# Patient Record
Sex: MALE | Race: WHITE | NOT HISPANIC OR LATINO | Employment: OTHER | URBAN - METROPOLITAN AREA
[De-identification: names, ages, dates, MRNs, and addresses within clinical notes are randomized per-mention and may not be internally consistent; named-entity substitution may affect disease eponyms.]

---

## 2023-01-16 PROBLEM — K21.00 CHRONIC REFLUX ESOPHAGITIS: Status: ACTIVE | Noted: 2023-01-16

## 2023-01-16 PROBLEM — R53.1 FEELING WEAK: Status: ACTIVE | Noted: 2023-01-16

## 2023-01-16 PROBLEM — R39.89 BLADDER PAIN: Status: ACTIVE | Noted: 2023-01-16

## 2023-01-16 PROBLEM — M79.673 FOOT PAIN: Status: ACTIVE | Noted: 2023-01-16

## 2023-01-16 PROBLEM — N19 RENAL FAILURE: Status: ACTIVE | Noted: 2023-01-16

## 2023-01-16 PROBLEM — R42 DIZZINESS: Status: ACTIVE | Noted: 2023-01-16

## 2023-01-16 PROBLEM — H66.90 EAR INFECTION: Status: ACTIVE | Noted: 2023-01-16

## 2023-01-16 PROBLEM — I65.29 CAROTID STENOSIS: Status: ACTIVE | Noted: 2023-01-16

## 2023-01-16 PROBLEM — E66.9 CLASS 1 OBESITY WITH BODY MASS INDEX (BMI) OF 31.0 TO 31.9 IN ADULT: Status: ACTIVE | Noted: 2023-01-16

## 2023-01-16 PROBLEM — M75.40 IMPINGEMENT SYNDROME OF SHOULDER: Status: ACTIVE | Noted: 2023-01-16

## 2023-01-16 PROBLEM — J32.9 SINUSITIS: Status: ACTIVE | Noted: 2023-01-16

## 2023-01-16 PROBLEM — R06.02 SHORTNESS OF BREATH: Status: ACTIVE | Noted: 2023-01-16

## 2023-01-16 PROBLEM — E55.9 VITAMIN D DEFICIENCY: Status: ACTIVE | Noted: 2023-01-16

## 2023-01-16 PROBLEM — M25.511 RIGHT SHOULDER PAIN: Status: ACTIVE | Noted: 2023-01-16

## 2023-01-16 PROBLEM — L03.90 CELLULITIS: Status: ACTIVE | Noted: 2023-01-16

## 2023-01-16 PROBLEM — I73.9 CLAUDICATION (CMS-HCC): Status: ACTIVE | Noted: 2023-01-16

## 2023-01-16 PROBLEM — H26.9 CATARACT: Status: ACTIVE | Noted: 2023-01-16

## 2023-01-16 PROBLEM — R35.0 URINARY FREQUENCY: Status: ACTIVE | Noted: 2023-01-16

## 2023-01-16 PROBLEM — M79.606 LEG PAIN: Status: ACTIVE | Noted: 2023-01-16

## 2023-01-16 PROBLEM — I25.10 ARTERIOSCLEROTIC CARDIOVASCULAR DISEASE: Status: ACTIVE | Noted: 2023-01-16

## 2023-01-16 PROBLEM — N23 KIDNEY PAIN: Status: ACTIVE | Noted: 2023-01-16

## 2023-01-16 PROBLEM — Z91.09 ENVIRONMENTAL ALLERGIES: Status: ACTIVE | Noted: 2023-01-16

## 2023-01-16 PROBLEM — K63.5 COLON POLYPS: Status: ACTIVE | Noted: 2023-01-16

## 2023-01-16 PROBLEM — I72.9 ANEURYSM (CMS-HCC): Status: ACTIVE | Noted: 2023-01-16

## 2023-01-16 PROBLEM — Z90.79 S/P TURP: Status: ACTIVE | Noted: 2023-01-16

## 2023-01-16 PROBLEM — H57.89 EYE REDNESS: Status: ACTIVE | Noted: 2023-01-16

## 2023-01-16 PROBLEM — I10 HYPERTENSION: Status: ACTIVE | Noted: 2023-01-16

## 2023-01-16 PROBLEM — D64.9 ANEMIA: Status: ACTIVE | Noted: 2023-01-16

## 2023-01-16 PROBLEM — K21.9 ESOPHAGEAL REFLUX: Status: ACTIVE | Noted: 2023-01-16

## 2023-01-16 PROBLEM — E78.00 HYPERCHOLESTEROLEMIA: Status: ACTIVE | Noted: 2023-01-16

## 2023-01-16 PROBLEM — R73.9 ELEVATED BLOOD SUGAR: Status: ACTIVE | Noted: 2023-01-16

## 2023-01-16 PROBLEM — M75.51 BURSITIS OF RIGHT SHOULDER: Status: ACTIVE | Noted: 2023-01-16

## 2023-01-16 PROBLEM — A09 ACUTE INFECTIVE GASTROENTERITIS: Status: ACTIVE | Noted: 2023-01-16

## 2023-01-16 PROBLEM — E16.3 ABNORMALITY OF GLUCAGON SECRETION (HHS-HCC): Status: ACTIVE | Noted: 2023-01-16

## 2023-01-16 PROBLEM — N39.0 UTI (URINARY TRACT INFECTION): Status: ACTIVE | Noted: 2023-01-16

## 2023-01-16 PROBLEM — I71.40 AAA (ABDOMINAL AORTIC ANEURYSM) (CMS-HCC): Status: ACTIVE | Noted: 2023-01-16

## 2023-01-16 PROBLEM — E78.00 PURE HYPERCHOLESTEROLEMIA: Status: ACTIVE | Noted: 2023-01-16

## 2023-01-16 PROBLEM — E66.811 CLASS 1 OBESITY WITH BODY MASS INDEX (BMI) OF 31.0 TO 31.9 IN ADULT: Status: ACTIVE | Noted: 2023-01-16

## 2023-01-16 PROBLEM — R10.9 ABDOMINAL PAIN: Status: ACTIVE | Noted: 2023-01-16

## 2023-01-16 PROBLEM — E86.0 DEHYDRATION: Status: ACTIVE | Noted: 2023-01-16

## 2023-01-16 PROBLEM — E53.9 VITAMIN B DEFICIENCY: Status: ACTIVE | Noted: 2023-01-16

## 2023-01-16 RX ORDER — FINASTERIDE 5 MG/1
5 TABLET, FILM COATED ORAL DAILY
COMMUNITY
End: 2023-06-29

## 2023-01-16 RX ORDER — HYDRALAZINE HYDROCHLORIDE 25 MG/1
25 TABLET, FILM COATED ORAL 3 TIMES DAILY
COMMUNITY
End: 2023-03-26

## 2023-01-16 RX ORDER — FAMOTIDINE 20 MG/1
20 TABLET, FILM COATED ORAL 2 TIMES DAILY
COMMUNITY
End: 2023-06-09

## 2023-01-16 RX ORDER — FLUTICASONE PROPIONATE 50 MCG
1 SPRAY, SUSPENSION (ML) NASAL
COMMUNITY
End: 2023-05-05

## 2023-01-16 RX ORDER — ASPIRIN 325 MG
325 TABLET ORAL DAILY
COMMUNITY

## 2023-01-16 RX ORDER — SIMVASTATIN 40 MG/1
40 TABLET, FILM COATED ORAL DAILY
COMMUNITY
End: 2023-06-10 | Stop reason: SDUPTHER

## 2023-01-16 RX ORDER — OFLOXACIN 3 MG/ML
10 SOLUTION AURICULAR (OTIC) DAILY
COMMUNITY
End: 2023-03-04 | Stop reason: ENTERED-IN-ERROR

## 2023-01-16 RX ORDER — FUROSEMIDE 40 MG/1
40 TABLET ORAL DAILY
COMMUNITY
End: 2023-05-05

## 2023-01-16 RX ORDER — ALFUZOSIN HYDROCHLORIDE 10 MG/1
10 TABLET, EXTENDED RELEASE ORAL
COMMUNITY
End: 2023-04-05

## 2023-01-16 RX ORDER — DOXYCYCLINE 100 MG/1
100 CAPSULE ORAL EVERY 12 HOURS
COMMUNITY
End: 2023-10-06 | Stop reason: WASHOUT

## 2023-01-16 RX ORDER — CLOPIDOGREL BISULFATE 75 MG/1
75 TABLET ORAL DAILY
COMMUNITY
End: 2023-06-29

## 2023-01-16 RX ORDER — POTASSIUM CHLORIDE 20 MEQ/1
20 TABLET, EXTENDED RELEASE ORAL
COMMUNITY
End: 2023-08-19 | Stop reason: SDUPTHER

## 2023-01-16 RX ORDER — METOPROLOL TARTRATE 50 MG/1
50 TABLET ORAL 2 TIMES DAILY
COMMUNITY
End: 2023-09-30

## 2023-01-16 RX ORDER — AMLODIPINE BESYLATE 10 MG/1
10 TABLET ORAL
COMMUNITY
End: 2023-06-29

## 2023-01-16 RX ORDER — ACETAMINOPHEN 500 MG
1 TABLET ORAL
COMMUNITY
End: 2023-10-01

## 2023-01-16 RX ORDER — PANTOPRAZOLE SODIUM 40 MG/1
40 TABLET, DELAYED RELEASE ORAL
COMMUNITY
End: 2023-07-08 | Stop reason: SDUPTHER

## 2023-01-16 RX ORDER — ACETAMINOPHEN, DEXTROMETHORPHAN HBR, DOXYLAMINE SUCCINATE, PHENYLEPHRINE HCL 650; 20; 12.5; 1 MG/30ML; MG/30ML; MG/30ML; MG/30ML
1000 SOLUTION ORAL DAILY
COMMUNITY

## 2023-01-30 RX ORDER — AMMONIUM LACTATE 12 G/100G
LOTION TOPICAL
COMMUNITY
Start: 2022-10-01

## 2023-03-04 ENCOUNTER — OFFICE VISIT (OUTPATIENT)
Dept: PRIMARY CARE | Facility: CLINIC | Age: 78
End: 2023-03-04
Payer: MEDICARE

## 2023-03-04 VITALS
DIASTOLIC BLOOD PRESSURE: 72 MMHG | HEIGHT: 72 IN | WEIGHT: 247 LBS | BODY MASS INDEX: 33.46 KG/M2 | SYSTOLIC BLOOD PRESSURE: 142 MMHG

## 2023-03-04 DIAGNOSIS — H66.90 EAR INFECTION: Primary | ICD-10-CM

## 2023-03-04 DIAGNOSIS — H60.20 OTITIS EXTERNA DUE TO PSEUDOMONAS AERUGINOSA: Primary | ICD-10-CM

## 2023-03-04 PROCEDURE — 3077F SYST BP >= 140 MM HG: CPT | Performed by: INTERNAL MEDICINE

## 2023-03-04 PROCEDURE — 1159F MED LIST DOCD IN RCRD: CPT | Performed by: INTERNAL MEDICINE

## 2023-03-04 PROCEDURE — 3078F DIAST BP <80 MM HG: CPT | Performed by: INTERNAL MEDICINE

## 2023-03-04 PROCEDURE — 99214 OFFICE O/P EST MOD 30 MIN: CPT | Performed by: INTERNAL MEDICINE

## 2023-03-04 RX ORDER — OFLOXACIN 3 MG/ML
5 SOLUTION AURICULAR (OTIC) 2 TIMES DAILY
Qty: 10 ML | Refills: 0 | Status: SHIPPED | OUTPATIENT
Start: 2023-03-04 | End: 2023-03-11

## 2023-03-04 RX ORDER — AMOXICILLIN AND CLAVULANATE POTASSIUM 875; 125 MG/1; MG/1
875 TABLET, FILM COATED ORAL 2 TIMES DAILY
Qty: 20 TABLET | Refills: 0 | Status: SHIPPED | OUTPATIENT
Start: 2023-03-04 | End: 2023-10-06 | Stop reason: WASHOUT

## 2023-03-04 RX ORDER — BACITRACIN 500 [USP'U]/G
1 OINTMENT TOPICAL ONCE
Status: CANCELLED | OUTPATIENT
Start: 2023-03-04

## 2023-03-04 RX ORDER — OFLOXACIN 3 MG/ML
5 SOLUTION AURICULAR (OTIC) ONCE
Status: CANCELLED | OUTPATIENT
Start: 2023-03-04 | End: 2023-03-04

## 2023-03-04 ASSESSMENT — ENCOUNTER SYMPTOMS
LOSS OF SENSATION IN FEET: 0
OCCASIONAL FEELINGS OF UNSTEADINESS: 0
DEPRESSION: 0

## 2023-03-06 ENCOUNTER — OFFICE VISIT (OUTPATIENT)
Dept: PRIMARY CARE | Facility: CLINIC | Age: 78
End: 2023-03-06
Payer: MEDICARE

## 2023-03-06 VITALS
HEIGHT: 72 IN | WEIGHT: 248.2 LBS | SYSTOLIC BLOOD PRESSURE: 128 MMHG | DIASTOLIC BLOOD PRESSURE: 72 MMHG | BODY MASS INDEX: 33.62 KG/M2

## 2023-03-06 DIAGNOSIS — I10 PRIMARY HYPERTENSION: Primary | ICD-10-CM

## 2023-03-06 PROCEDURE — 3078F DIAST BP <80 MM HG: CPT | Performed by: INTERNAL MEDICINE

## 2023-03-06 PROCEDURE — 99213 OFFICE O/P EST LOW 20 MIN: CPT | Performed by: INTERNAL MEDICINE

## 2023-03-06 PROCEDURE — 1159F MED LIST DOCD IN RCRD: CPT | Performed by: INTERNAL MEDICINE

## 2023-03-06 PROCEDURE — 3074F SYST BP LT 130 MM HG: CPT | Performed by: INTERNAL MEDICINE

## 2023-03-09 NOTE — PROGRESS NOTES
NAME OF THE PATIENT: Charles Chan    YOB: 1945    CHIEF COMPLAINT:  This gentleman today came here for follow-up on right ear pinna perichondritis.  It is improving, red, inflammation and tender much better.  The patient is 20% better.  He is started taking antibiotics.  Pain is calming well.  Pain is getting better.  Not feeling well.    PAST MEDICAL HISTORY:  Reviewed on EMR, unchanged.    CURRENT MEDICATIONS:  Reviewed on EMR, unchanged.  List reviewed.    ALLERGIES:  Reviewed on EMR, unchanged.    SOCIAL HISTORY:  Reviewed on EMR, unchanged.  He does not smoke and does not drink alcohol.    FAMILY HISTORY:  Reviewed on EMR, unchanged.    REVIEW OF SYSTEMS:  All 12 systems reviewed and pertaining covered in history and physical.    PHYSICAL EXAMINATION  VITAL SIGNS:  As recorded and reviewed from EMR.  RESPIRATORY:  The patient had normal inspirations and expirations.  The breath sounds were equal bilaterally and clear to auscultation.  CARDIOVASCULAR:  The patient had S1 normal, split S2 without obvious rubs, clicks, or murmurs.    GASTROINTESTINAL:  There was no hepatosplenomegaly.  There were no palpable masses and no inguinal nodes.  EXTREMITIES:  Legs had no edema.  NEUROLOGIC:  The patient had normal cranial nerves.  The reflexes, sensory, and motor examination were grossly within normal limits.  SKIN:  Right pinna perichondritis, redness much better.  Cellulitis improved.    LAB WORK:  Laboratory testing discussed.    ASSESSMENT AND PLAN:  Cellulitis, perichondritis, infection.  Slow response.  Continue with antibiotics.  Pre-diabetic, renal insufficiency.  Monitor.  Advised to drink enough water.  Stay hydrated.  See me ASAP if needed. Otherwise, follow-up on Monday.  Happy to see him anytime sooner if necessary.      Kindly review this note in conjunction with EMR.   Subjective   Patient ID: Charles Chan is a 77 y.o. male who presents for Follow-up.      HPI    Review of  Systems    Objective   /72   Ht 1.829 m (6')   Wt 113 kg (248 lb 3.2 oz)   BMI 33.66 kg/m²       Physical Exam    Assessment/Plan   Problem List Items Addressed This Visit    None

## 2023-03-13 ENCOUNTER — OFFICE VISIT (OUTPATIENT)
Dept: PRIMARY CARE | Facility: CLINIC | Age: 78
End: 2023-03-13
Payer: MEDICARE

## 2023-03-13 VITALS
SYSTOLIC BLOOD PRESSURE: 134 MMHG | WEIGHT: 249 LBS | DIASTOLIC BLOOD PRESSURE: 72 MMHG | HEIGHT: 72 IN | BODY MASS INDEX: 33.72 KG/M2

## 2023-03-13 DIAGNOSIS — I10 PRIMARY HYPERTENSION: ICD-10-CM

## 2023-03-13 DIAGNOSIS — L30.9 DERMATITIS: Primary | ICD-10-CM

## 2023-03-13 PROCEDURE — 3078F DIAST BP <80 MM HG: CPT | Performed by: INTERNAL MEDICINE

## 2023-03-13 PROCEDURE — 99213 OFFICE O/P EST LOW 20 MIN: CPT | Performed by: INTERNAL MEDICINE

## 2023-03-13 PROCEDURE — 1159F MED LIST DOCD IN RCRD: CPT | Performed by: INTERNAL MEDICINE

## 2023-03-13 PROCEDURE — 3075F SYST BP GE 130 - 139MM HG: CPT | Performed by: INTERNAL MEDICINE

## 2023-03-13 RX ORDER — ECONAZOLE NITRATE 10 MG/G
CREAM TOPICAL DAILY
Qty: 300 G | Refills: 0 | Status: SHIPPED | OUTPATIENT
Start: 2023-03-13 | End: 2024-05-03

## 2023-03-13 NOTE — PROGRESS NOTES
NAME OF THE PATIENT: Charles Chan    YOB: 1945    CHIEF COMPLAINT:  Mr. Charles Chan today came here for follow-up.  Right ear pinna infection has significant improvement.  Feeling better, but he forgets to put the ear drops a couple of times.  Follow-up on other conditions.  New problem, he has athlete foot, itchy, scratchy, it is not getting any better.    PAST MEDICAL HISTORY:  Reviewed on EMR, unchanged.    CURRENT MEDICATIONS:  Reviewed on EMR, unchanged.  List reviewed.    ALLERGIES:  Reviewed on EMR, unchanged.    SOCIAL HISTORY:  Reviewed on EMR, unchanged.  He does not smoke and does not drink alcohol.    FAMILY HISTORY:  Reviewed on EMR, unchanged.    REVIEW OF SYSTEMS:  All 12 systems reviewed and pertaining covered in history and physical.    PHYSICAL EXAMINATION  VITAL SIGNS:  As recorded and reviewed from EMR.  ENT:  right ear otitis externa present.  RESPIRATORY:  The patient had normal inspirations and expirations.  The breath sounds were equal bilaterally and clear to auscultation.  CARDIOVASCULAR:  The patient had S1 normal, split S2 without obvious rubs, clicks, or murmurs.    GASTROINTESTINAL:  There was no hepatosplenomegaly.  There were no palpable masses and no inguinal nodes.  EXTREMITIES:  Legs had no edema.  Feet, athlete foot present.  NEUROLOGIC:  The patient had normal cranial nerves.  The reflexes, sensory, and motor examination were grossly within normal limits.    LAB WORK:  Laboratory testing discussed.    ASSESSMENT AND PLAN:  Right ear otitis externa is responding very good to treatment.  Continue.  Athlete foot.  Econazole cream.  Renal insufficiency, okay.  Hypertension, okay.  Routine blood work ordered.  I will see back him in four weeks.      Kindly review this note in conjunction with EMR.   Subjective   Patient ID: Charles Chan is a 77 y.o. male who presents for Follow-up.      HPI    Review of Systems    Objective   /72   Ht 1.829 m (6')   Wt  113 kg (249 lb)   BMI 33.77 kg/m²       Physical Exam    Assessment/Plan   Problem List Items Addressed This Visit    None

## 2023-03-26 DIAGNOSIS — I10 ESSENTIAL (PRIMARY) HYPERTENSION: ICD-10-CM

## 2023-03-26 RX ORDER — HYDRALAZINE HYDROCHLORIDE 25 MG/1
TABLET, FILM COATED ORAL
Qty: 270 TABLET | Refills: 1 | Status: SHIPPED | OUTPATIENT
Start: 2023-03-26 | End: 2023-08-19 | Stop reason: SDUPTHER

## 2023-04-03 ENCOUNTER — LAB (OUTPATIENT)
Dept: LAB | Facility: LAB | Age: 78
End: 2023-04-03
Payer: MEDICARE

## 2023-04-03 DIAGNOSIS — K80.63 GALLBLADDER & BILE DUCT STONE, ACUTE CHOLECYSTITIS AND OBSTRUCTION: ICD-10-CM

## 2023-04-03 LAB
ALANINE AMINOTRANSFERASE (SGPT) (U/L) IN SER/PLAS: 13 U/L (ref 10–52)
ALBUMIN (G/DL) IN SER/PLAS: 3.9 G/DL (ref 3.4–5)
ALKALINE PHOSPHATASE (U/L) IN SER/PLAS: 77 U/L (ref 33–136)
ANION GAP IN SER/PLAS: 14 MMOL/L (ref 10–20)
ASPARTATE AMINOTRANSFERASE (SGOT) (U/L) IN SER/PLAS: 16 U/L (ref 9–39)
BILIRUBIN TOTAL (MG/DL) IN SER/PLAS: 0.8 MG/DL (ref 0–1.2)
CALCIUM (MG/DL) IN SER/PLAS: 8.8 MG/DL (ref 8.6–10.3)
CARBON DIOXIDE, TOTAL (MMOL/L) IN SER/PLAS: 24 MMOL/L (ref 21–32)
CHLORIDE (MMOL/L) IN SER/PLAS: 109 MMOL/L (ref 98–107)
CREATININE (MG/DL) IN SER/PLAS: 2.56 MG/DL (ref 0.5–1.3)
GFR MALE: 25 ML/MIN/1.73M2
GLUCOSE (MG/DL) IN SER/PLAS: 93 MG/DL (ref 74–99)
POTASSIUM (MMOL/L) IN SER/PLAS: 4.7 MMOL/L (ref 3.5–5.3)
PROTEIN TOTAL: 7 G/DL (ref 6.4–8.2)
SODIUM (MMOL/L) IN SER/PLAS: 142 MMOL/L (ref 136–145)
UREA NITROGEN (MG/DL) IN SER/PLAS: 32 MG/DL (ref 6–23)

## 2023-04-03 PROCEDURE — 80053 COMPREHEN METABOLIC PANEL: CPT

## 2023-04-03 PROCEDURE — 36415 COLL VENOUS BLD VENIPUNCTURE: CPT

## 2023-04-05 DIAGNOSIS — Z87.438 PERSONAL HISTORY OF OTHER DISEASES OF MALE GENITAL ORGANS: ICD-10-CM

## 2023-04-05 RX ORDER — ALFUZOSIN HYDROCHLORIDE 10 MG/1
TABLET, EXTENDED RELEASE ORAL
Qty: 90 TABLET | Refills: 1 | Status: SHIPPED | OUTPATIENT
Start: 2023-04-05 | End: 2023-09-30

## 2023-04-10 ENCOUNTER — OFFICE VISIT (OUTPATIENT)
Dept: PRIMARY CARE | Facility: CLINIC | Age: 78
End: 2023-04-10
Payer: MEDICARE

## 2023-04-10 VITALS
SYSTOLIC BLOOD PRESSURE: 134 MMHG | WEIGHT: 246.4 LBS | DIASTOLIC BLOOD PRESSURE: 72 MMHG | HEIGHT: 72 IN | BODY MASS INDEX: 33.38 KG/M2

## 2023-04-10 DIAGNOSIS — R10.9 ABDOMINAL PAIN, UNSPECIFIED ABDOMINAL LOCATION: ICD-10-CM

## 2023-04-10 PROCEDURE — 1159F MED LIST DOCD IN RCRD: CPT | Performed by: INTERNAL MEDICINE

## 2023-04-10 PROCEDURE — 3075F SYST BP GE 130 - 139MM HG: CPT | Performed by: INTERNAL MEDICINE

## 2023-04-10 PROCEDURE — 3078F DIAST BP <80 MM HG: CPT | Performed by: INTERNAL MEDICINE

## 2023-04-10 PROCEDURE — 99214 OFFICE O/P EST MOD 30 MIN: CPT | Performed by: INTERNAL MEDICINE

## 2023-04-10 NOTE — PROGRESS NOTES
OFFICE NOTE    NAME OF THE PATIENT: hCarles Chan    YOB: 1945    CHIEF COMPLAINT: This gentleman today came here for multiple issues.  Right ear pain, itchy, scratchy rash, not getting any better.  Mucoid discharge is coming out.  This problem is ongoing, but it got worse.  Follow-up on blood work and other conditions.  He had a blood work on Monday.  He came here for follow-up.  Appetite and weight are okay.  No problem.  No leg swelling.  He has cut down on the salt.    PAST MEDICAL HISTORY: Reviewed on EMR, unchanged.    CURRENT MEDICATIONS: Reviewed on EMR, unchanged.  List reviewed.    ALLERGIES: Reviewed on EMR, unchanged.    SOCIAL HISTORY: Reviewed on EMR, unchanged.  He does not smoke and does not drink alcohol.    FAMILY HISTORY: Reviewed on EMR, unchanged.    REVIEW OF SYSTEMS: All 12 systems reviewed and pertaining covered in history and physical.    PHYSICAL EXAMINATION  VITAL SIGNS: As recorded and reviewed from EMR.  EARS: Right pinna exam, red, inflamed, tender.  ______ present that is going right in the canal.  Canal is full of slough and mucoid discharge.  RESPIRATORY: The patient had normal inspirations and expirations.  The breath sounds were equal bilaterally and clear to auscultation.  CARDIOVASCULAR: The patient had S1 normal, split S2 without obvious rubs, clicks, or murmurs.    GASTROINTESTINAL: There was no hepatosplenomegaly.  There were no palpable masses and no inguinal nodes.  EXTREMITIES: Legs had no edema.  NEUROLOGIC: The patient had normal cranial nerves.  The reflexes, sensory, and motor examination were grossly within normal limits.    LAB WORK: Laboratory testing discussed.    ASSESSMENT AND PLAN:  Otitis externa, costochondritis, infection and cellulitis.  Clean with hot water only.  Floxin ear drops.  I gave him Augmentin and doxycycline that should cover MRSA.  Bacitracin cream given.  Pre-diabetic.  It could be predisposing for infection.   Monitor.  Chronic renal failure, stage 3 now.  I am a bit concerned with that.  Drink enough water.  Hypertension, okay.  See him on Monday or Tuesday.  If it gets worse, he will be admitted for IV antibiotics.      Kindly review this note in conjunction with EMR.     Subjective   Patient ID: Charles Chan is a 77 y.o. male who presents for Follow-up.      HPI    Review of Systems    Objective   /72   Ht 1.829 m (6')   Wt 112 kg (247 lb)   BMI 33.50 kg/m²       Physical Exam    Assessment/Plan   Problem List Items Addressed This Visit          Infectious/Inflammatory    Ear infection - Primary    Relevant Medications    amoxicillin-pot clavulanate (Augmentin) 875-125 mg tablet

## 2023-04-10 NOTE — PROGRESS NOTES
OFFICE NOTE    NAME OF THE PATIENT: Charles Chan    YOB: 1945    CHIEF COMPLAINT:  This gentleman today came here for follow-up on various conditions.  Overall, he is okay.  He is wondering the virtue for metoprolol and amlodipine.  He thinks that could be causing the problem.  Appetite and weight are okay.  No problem.    PAST MEDICAL HISTORY:  Reviewed on EMR, unchanged.    CURRENT MEDICATIONS:  Reviewed on EMR, unchanged.  List reviewed.    ALLERGIES:  Reviewed on EMR, unchanged.    SOCIAL HISTORY:  Reviewed on EMR, unchanged.  He does not smoke and does not drink alcohol.    FAMILY HISTORY:  Reviewed on EMR, unchanged.    REVIEW OF SYSTEMS:  All 12 systems reviewed and pertaining covered in history and physical.    PHYSICAL EXAMINATION  VITAL SIGNS:  As recorded and reviewed from EMR.  RESPIRATORY:  The patient had normal inspirations and expirations.  The breath sounds were equal bilaterally and clear to auscultation.  CARDIOVASCULAR:  The patient had S1 normal, split S2 without obvious rubs, clicks, or murmurs.    GASTROINTESTINAL:  There was no hepatosplenomegaly.  There were no palpable masses and no inguinal nodes.  EXTREMITIES:  Legs had edema.  NEUROLOGIC:  The patient had normal cranial nerves.  The reflexes, sensory, and motor examination were grossly within normal limits.    LAB WORK:  Laboratory testing discussed.    ASSESSMENT AND PLAN:  Chronic renal failure, stage 3, stabilized, but I will keep an eye.  Hypertension.  The patient has a reservation about taking metoprolol and amlodipine.  Explained both the drugs.  We discussed about different alternatives, but considering the condition, we decided to stay on the both.  Edema.  Lasix and potassium.  Monitor kidney.  Hypertension, okay.  High cholesterol, stable.  GERD, on PPI.  Follow-up appointment with me in two to three weeks time.      Kindly review this note in conjunction with EMR.   Subjective   Patient ID: Charles BELLA  Dustin is a 77 y.o. male who presents for Follow-up.      HPI    Review of Systems    Objective   /72   Ht 1.829 m (6')   Wt 112 kg (246 lb 6.4 oz)   BMI 33.42 kg/m²       Physical Exam    Assessment/Plan   Problem List Items Addressed This Visit    None

## 2023-04-21 ENCOUNTER — LAB (OUTPATIENT)
Dept: LAB | Facility: LAB | Age: 78
End: 2023-04-21
Payer: MEDICARE

## 2023-04-21 DIAGNOSIS — R10.9 ABDOMINAL PAIN, UNSPECIFIED ABDOMINAL LOCATION: ICD-10-CM

## 2023-04-21 LAB
ANION GAP IN SER/PLAS: 11 MMOL/L (ref 10–20)
CALCIUM (MG/DL) IN SER/PLAS: 8.9 MG/DL (ref 8.6–10.3)
CARBON DIOXIDE, TOTAL (MMOL/L) IN SER/PLAS: 26 MMOL/L (ref 21–32)
CHLORIDE (MMOL/L) IN SER/PLAS: 106 MMOL/L (ref 98–107)
CREATININE (MG/DL) IN SER/PLAS: 2.42 MG/DL (ref 0.5–1.3)
GFR MALE: 26 ML/MIN/1.73M2
GLUCOSE (MG/DL) IN SER/PLAS: 83 MG/DL (ref 74–99)
POTASSIUM (MMOL/L) IN SER/PLAS: 4.4 MMOL/L (ref 3.5–5.3)
SODIUM (MMOL/L) IN SER/PLAS: 139 MMOL/L (ref 136–145)
UREA NITROGEN (MG/DL) IN SER/PLAS: 29 MG/DL (ref 6–23)

## 2023-04-21 PROCEDURE — 36415 COLL VENOUS BLD VENIPUNCTURE: CPT

## 2023-04-21 PROCEDURE — 80048 BASIC METABOLIC PNL TOTAL CA: CPT

## 2023-04-29 ENCOUNTER — OFFICE VISIT (OUTPATIENT)
Dept: PRIMARY CARE | Facility: CLINIC | Age: 78
End: 2023-04-29
Payer: MEDICARE

## 2023-04-29 VITALS
HEIGHT: 72 IN | SYSTOLIC BLOOD PRESSURE: 126 MMHG | DIASTOLIC BLOOD PRESSURE: 78 MMHG | WEIGHT: 243 LBS | BODY MASS INDEX: 32.91 KG/M2

## 2023-04-29 DIAGNOSIS — E78.00 HYPERCHOLESTEROLEMIA: ICD-10-CM

## 2023-04-29 PROCEDURE — 3074F SYST BP LT 130 MM HG: CPT | Performed by: INTERNAL MEDICINE

## 2023-04-29 PROCEDURE — 1159F MED LIST DOCD IN RCRD: CPT | Performed by: INTERNAL MEDICINE

## 2023-04-29 PROCEDURE — 3078F DIAST BP <80 MM HG: CPT | Performed by: INTERNAL MEDICINE

## 2023-04-29 PROCEDURE — 99214 OFFICE O/P EST MOD 30 MIN: CPT | Performed by: INTERNAL MEDICINE

## 2023-04-29 NOTE — PROGRESS NOTES
OFFICE NOTE    NAME OF THE PATIENT: Charles Chan    YOB: 1945    CHIEF COMPLAINT:  It is always a delight to serve Mr. Chan.  He brought today for polypharmacy  review, all his medicine.  Appetite and weight are okay.  No problem.  He came here for follow-up.  He is drinking more water, cut down the salt and he came here for kidney checkup.  He brought all his medicine for review.    PAST MEDICAL HISTORY:  Reviewed on EMR, unchanged.    CURRENT MEDICATIONS:  Reviewed on EMR, unchanged.  Lasix, potassium 4 pm, Plavix 6 pm, Protonix 40 mg b.i.d., ______ (Uroxatral) in the evening time, finasteride 5 mg once a day, hydralazine 25 mg t.i.d., baby aspirin with simvastatin at bedtime, vitamin B12, vitamin D, metoprolol tartrate 50 mg b.i.d., famotidine 20 mg as needed, Flonase nasal spray as needed, and Lac-Hydrin solution 12% for dry skin.    ALLERGIES:  Reviewed on EMR, unchanged.    SOCIAL HISTORY:  Reviewed on EMR, unchanged.  He does not smoke and does not drink alcohol.    FAMILY HISTORY:  Reviewed on EMR, unchanged.    REVIEW OF SYSTEMS:  All 12 systems reviewed and pertaining covered in history and physical.    PHYSICAL EXAMINATION  VITAL SIGNS:  As recorded and reviewed from EMR.  RESPIRATORY:  The patient had normal inspirations and expirations.  The breath sounds were equal bilaterally and clear to auscultation.  CARDIOVASCULAR:  The patient had S1 normal, split S2 without obvious rubs, clicks, or murmurs.    GASTROINTESTINAL:  There was no hepatosplenomegaly.  There were no palpable masses and no inguinal nodes.  EXTREMITIES:  Legs had no edema.  NEUROLOGIC:  The patient had normal cranial nerves.  The reflexes, sensory, and motor examination were grossly within normal limits.    LAB WORK:  Laboratory testing discussed.    ASSESSMENT AND PLAN:  Chronic renal failure, stage 2 to 3, it is stabilized now.  He is doing good.  I am going to keep an eye on kidney function.  Cut down the salt.   Drink more water.  Kidney is stabilized.  I congratulated him.  Hypertension, excellent.  Cholesterol.  Monitor.  Pre-diabetic.  Keep an eye on sugar.  BPH, on medication, doing good.  GERD, on PPI.  He sees a specialist.  Follow-up appointment with me in about three to four weeks with repeat kidney function.      Kindly review this note in conjunction with EMR.     Subjective   Patient ID: Charles Chan is a 77 y.o. male who presents for Follow-up.      HPI    Review of Systems    Objective   /78   Ht 1.829 m (6')   Wt 110 kg (243 lb)   BMI 32.96 kg/m²       Physical Exam    Assessment/Plan   Problem List Items Addressed This Visit    None

## 2023-05-05 DIAGNOSIS — Z00.00 ENCOUNTER FOR GENERAL ADULT MEDICAL EXAMINATION WITHOUT ABNORMAL FINDINGS: ICD-10-CM

## 2023-05-05 DIAGNOSIS — M79.606 PAIN IN LEG, UNSPECIFIED: ICD-10-CM

## 2023-05-05 DIAGNOSIS — Z91.09 OTHER ALLERGY STATUS, OTHER THAN TO DRUGS AND BIOLOGICAL SUBSTANCES: ICD-10-CM

## 2023-05-05 RX ORDER — POTASSIUM CHLORIDE 1500 MG/1
TABLET, EXTENDED RELEASE ORAL
Qty: 90 TABLET | Refills: 1 | Status: SHIPPED | OUTPATIENT
Start: 2023-05-05 | End: 2023-12-04

## 2023-05-05 RX ORDER — FUROSEMIDE 40 MG/1
TABLET ORAL
Qty: 90 TABLET | Refills: 1 | Status: SHIPPED | OUTPATIENT
Start: 2023-05-05 | End: 2023-12-04

## 2023-05-05 RX ORDER — FLUTICASONE PROPIONATE 50 MCG
SPRAY, SUSPENSION (ML) NASAL
Qty: 16 ML | Refills: 1 | Status: SHIPPED | OUTPATIENT
Start: 2023-05-05 | End: 2023-10-14

## 2023-05-27 ENCOUNTER — APPOINTMENT (OUTPATIENT)
Dept: PRIMARY CARE | Facility: CLINIC | Age: 78
End: 2023-05-27
Payer: MEDICARE

## 2023-06-03 ENCOUNTER — LAB (OUTPATIENT)
Dept: LAB | Facility: LAB | Age: 78
End: 2023-06-03
Payer: MEDICARE

## 2023-06-03 DIAGNOSIS — E78.00 HYPERCHOLESTEROLEMIA: ICD-10-CM

## 2023-06-03 LAB
ALANINE AMINOTRANSFERASE (SGPT) (U/L) IN SER/PLAS: 13 U/L (ref 10–52)
ALBUMIN (G/DL) IN SER/PLAS: 3.9 G/DL (ref 3.4–5)
ALKALINE PHOSPHATASE (U/L) IN SER/PLAS: 82 U/L (ref 33–136)
ANION GAP IN SER/PLAS: 13 MMOL/L (ref 10–20)
ASPARTATE AMINOTRANSFERASE (SGOT) (U/L) IN SER/PLAS: 18 U/L (ref 9–39)
BILIRUBIN TOTAL (MG/DL) IN SER/PLAS: 1 MG/DL (ref 0–1.2)
CALCIUM (MG/DL) IN SER/PLAS: 8.9 MG/DL (ref 8.6–10.3)
CARBON DIOXIDE, TOTAL (MMOL/L) IN SER/PLAS: 24 MMOL/L (ref 21–32)
CHLORIDE (MMOL/L) IN SER/PLAS: 107 MMOL/L (ref 98–107)
CREATININE (MG/DL) IN SER/PLAS: 2.39 MG/DL (ref 0.5–1.3)
GFR MALE: 27 ML/MIN/1.73M2
GLUCOSE (MG/DL) IN SER/PLAS: 98 MG/DL (ref 74–99)
POTASSIUM (MMOL/L) IN SER/PLAS: 4.5 MMOL/L (ref 3.5–5.3)
PROTEIN TOTAL: 6.6 G/DL (ref 6.4–8.2)
SODIUM (MMOL/L) IN SER/PLAS: 139 MMOL/L (ref 136–145)
UREA NITROGEN (MG/DL) IN SER/PLAS: 29 MG/DL (ref 6–23)

## 2023-06-03 PROCEDURE — 36415 COLL VENOUS BLD VENIPUNCTURE: CPT

## 2023-06-03 PROCEDURE — 80053 COMPREHEN METABOLIC PANEL: CPT

## 2023-06-09 DIAGNOSIS — Z00.00 ENCOUNTER FOR GENERAL ADULT MEDICAL EXAMINATION WITHOUT ABNORMAL FINDINGS: ICD-10-CM

## 2023-06-09 RX ORDER — FAMOTIDINE 20 MG/1
TABLET, FILM COATED ORAL
Qty: 180 TABLET | Refills: 1 | Status: SHIPPED | OUTPATIENT
Start: 2023-06-09 | End: 2023-12-04

## 2023-06-10 ENCOUNTER — OFFICE VISIT (OUTPATIENT)
Dept: PRIMARY CARE | Facility: CLINIC | Age: 78
End: 2023-06-10
Payer: MEDICARE

## 2023-06-10 VITALS
SYSTOLIC BLOOD PRESSURE: 124 MMHG | WEIGHT: 235 LBS | HEIGHT: 72 IN | BODY MASS INDEX: 31.83 KG/M2 | DIASTOLIC BLOOD PRESSURE: 70 MMHG

## 2023-06-10 DIAGNOSIS — R35.0 URINARY FREQUENCY: ICD-10-CM

## 2023-06-10 DIAGNOSIS — E78.00 PURE HYPERCHOLESTEROLEMIA: ICD-10-CM

## 2023-06-10 PROCEDURE — 99213 OFFICE O/P EST LOW 20 MIN: CPT | Performed by: INTERNAL MEDICINE

## 2023-06-10 PROCEDURE — 3078F DIAST BP <80 MM HG: CPT | Performed by: INTERNAL MEDICINE

## 2023-06-10 PROCEDURE — 1159F MED LIST DOCD IN RCRD: CPT | Performed by: INTERNAL MEDICINE

## 2023-06-10 PROCEDURE — 3074F SYST BP LT 130 MM HG: CPT | Performed by: INTERNAL MEDICINE

## 2023-06-10 RX ORDER — SIMVASTATIN 40 MG/1
40 TABLET, FILM COATED ORAL DAILY
Qty: 90 TABLET | Refills: 0 | Status: SHIPPED | OUTPATIENT
Start: 2023-06-10 | End: 2023-10-24

## 2023-07-08 ENCOUNTER — OFFICE VISIT (OUTPATIENT)
Dept: PRIMARY CARE | Facility: CLINIC | Age: 78
End: 2023-07-08
Payer: MEDICARE

## 2023-07-08 ENCOUNTER — LAB (OUTPATIENT)
Dept: LAB | Facility: LAB | Age: 78
End: 2023-07-08
Payer: MEDICARE

## 2023-07-08 VITALS
HEIGHT: 72 IN | SYSTOLIC BLOOD PRESSURE: 140 MMHG | DIASTOLIC BLOOD PRESSURE: 72 MMHG | BODY MASS INDEX: 32.56 KG/M2 | WEIGHT: 240.4 LBS

## 2023-07-08 DIAGNOSIS — R35.0 URINARY FREQUENCY: ICD-10-CM

## 2023-07-08 DIAGNOSIS — E78.00 PURE HYPERCHOLESTEROLEMIA: ICD-10-CM

## 2023-07-08 DIAGNOSIS — E78.00 HYPERCHOLESTEROLEMIA: ICD-10-CM

## 2023-07-08 DIAGNOSIS — I10 PRIMARY HYPERTENSION: ICD-10-CM

## 2023-07-08 DIAGNOSIS — R10.9 ABDOMINAL PAIN, UNSPECIFIED ABDOMINAL LOCATION: ICD-10-CM

## 2023-07-08 LAB
ALANINE AMINOTRANSFERASE (SGPT) (U/L) IN SER/PLAS: 10 U/L (ref 10–52)
ALBUMIN (G/DL) IN SER/PLAS: 4 G/DL (ref 3.4–5)
ALKALINE PHOSPHATASE (U/L) IN SER/PLAS: 78 U/L (ref 33–136)
ANION GAP IN SER/PLAS: 12 MMOL/L (ref 10–20)
APPEARANCE, URINE: CLEAR
ASPARTATE AMINOTRANSFERASE (SGOT) (U/L) IN SER/PLAS: 13 U/L (ref 9–39)
BILIRUBIN TOTAL (MG/DL) IN SER/PLAS: 0.8 MG/DL (ref 0–1.2)
BILIRUBIN, URINE: NEGATIVE
BLOOD, URINE: NEGATIVE
CALCIUM (MG/DL) IN SER/PLAS: 9.6 MG/DL (ref 8.6–10.6)
CARBON DIOXIDE, TOTAL (MMOL/L) IN SER/PLAS: 25 MMOL/L (ref 21–32)
CHLORIDE (MMOL/L) IN SER/PLAS: 107 MMOL/L (ref 98–107)
COLOR, URINE: YELLOW
CREATININE (MG/DL) IN SER/PLAS: 2.09 MG/DL (ref 0.5–1.3)
ERYTHROCYTE DISTRIBUTION WIDTH (RATIO) BY AUTOMATED COUNT: 12.3 % (ref 11.5–14.5)
ERYTHROCYTE MEAN CORPUSCULAR HEMOGLOBIN CONCENTRATION (G/DL) BY AUTOMATED: 32.4 G/DL (ref 32–36)
ERYTHROCYTE MEAN CORPUSCULAR VOLUME (FL) BY AUTOMATED COUNT: 89 FL (ref 80–100)
ERYTHROCYTES (10*6/UL) IN BLOOD BY AUTOMATED COUNT: 4.55 X10E12/L (ref 4.5–5.9)
GFR MALE: 31 ML/MIN/1.73M2
GLUCOSE (MG/DL) IN SER/PLAS: 103 MG/DL (ref 74–99)
GLUCOSE, URINE: NEGATIVE MG/DL
HEMATOCRIT (%) IN BLOOD BY AUTOMATED COUNT: 40.7 % (ref 41–52)
HEMOGLOBIN (G/DL) IN BLOOD: 13.2 G/DL (ref 13.5–17.5)
HYALINE CASTS, URINE: ABNORMAL /LPF
KETONES, URINE: NEGATIVE MG/DL
LEUKOCYTE ESTERASE, URINE: NEGATIVE
LEUKOCYTES (10*3/UL) IN BLOOD BY AUTOMATED COUNT: 8.9 X10E9/L (ref 4.4–11.3)
MUCUS, URINE: ABNORMAL /LPF
NITRITE, URINE: NEGATIVE
NRBC (PER 100 WBCS) BY AUTOMATED COUNT: 0 /100 WBC (ref 0–0)
PH, URINE: 5 (ref 5–8)
PLATELETS (10*3/UL) IN BLOOD AUTOMATED COUNT: 258 X10E9/L (ref 150–450)
POTASSIUM (MMOL/L) IN SER/PLAS: 5 MMOL/L (ref 3.5–5.3)
PROTEIN TOTAL: 7 G/DL (ref 6.4–8.2)
PROTEIN, URINE: ABNORMAL MG/DL
RBC, URINE: 1 /HPF (ref 0–5)
SODIUM (MMOL/L) IN SER/PLAS: 139 MMOL/L (ref 136–145)
SPECIFIC GRAVITY, URINE: 1.02 (ref 1–1.03)
UREA NITROGEN (MG/DL) IN SER/PLAS: 37 MG/DL (ref 6–23)
UROBILINOGEN, URINE: <2 MG/DL (ref 0–1.9)
WBC, URINE: <1 /HPF (ref 0–5)

## 2023-07-08 PROCEDURE — 85027 COMPLETE CBC AUTOMATED: CPT

## 2023-07-08 PROCEDURE — 81001 URINALYSIS AUTO W/SCOPE: CPT

## 2023-07-08 PROCEDURE — 36415 COLL VENOUS BLD VENIPUNCTURE: CPT

## 2023-07-08 PROCEDURE — 80053 COMPREHEN METABOLIC PANEL: CPT

## 2023-07-08 PROCEDURE — 1159F MED LIST DOCD IN RCRD: CPT | Performed by: INTERNAL MEDICINE

## 2023-07-08 PROCEDURE — 99213 OFFICE O/P EST LOW 20 MIN: CPT | Performed by: INTERNAL MEDICINE

## 2023-07-08 PROCEDURE — 3078F DIAST BP <80 MM HG: CPT | Performed by: INTERNAL MEDICINE

## 2023-07-08 PROCEDURE — 87086 URINE CULTURE/COLONY COUNT: CPT

## 2023-07-08 PROCEDURE — 3077F SYST BP >= 140 MM HG: CPT | Performed by: INTERNAL MEDICINE

## 2023-07-08 RX ORDER — PANTOPRAZOLE SODIUM 40 MG/1
40 TABLET, DELAYED RELEASE ORAL
Qty: 90 TABLET | Refills: 0 | Status: SHIPPED | OUTPATIENT
Start: 2023-07-08 | End: 2024-01-04

## 2023-07-08 NOTE — PROGRESS NOTES
OFFICE NOTE    NAME OF THE PATIENT: Charles Chan    YOB: 1945    CHIEF COMPLAINT:  Mr. Chan today came here for follow-up on various conditions.  He told me that left flank pain is better.  He is feeling okay.  He is eating right time, doing exercise, losing weight.  He told me that he had a blood work done two weeks ago at ______ [facility], but I cannot find the reports.  No side effects.  Feeling okay.  No problem.    PAST MEDICAL HISTORY:  Reviewed on EMR, unchanged.    CURRENT MEDICATIONS:  Reviewed on EMR, unchanged.  List reviewed.    ALLERGIES:  Reviewed on EMR, unchanged.    SOCIAL HISTORY:  Reviewed on EMR, unchanged.  He does not smoke and does not drink alcohol.    FAMILY HISTORY:  Reviewed on EMR, unchanged.    REVIEW OF SYSTEMS:  All 12 systems reviewed and pertaining covered in history and physical.    PHYSICAL EXAMINATION  VITAL SIGNS:  As recorded and reviewed from EMR.  RESPIRATORY:  The patient had normal inspirations and expirations.  The breath sounds were equal bilaterally and clear to auscultation.  CARDIOVASCULAR:  The patient had S1 normal, split S2 without obvious rubs, clicks, or murmurs.    GASTROINTESTINAL:  There was no hepatosplenomegaly.  There were no palpable masses and no inguinal nodes.  EXTREMITIES:  Legs had no edema.  NEUROLOGIC:  The patient had normal cranial nerves.  The reflexes, sensory, and motor examination were grossly within normal limits.    LAB WORK:  Laboratory testing discussed.    ASSESSMENT AND PLAN:  Chronic renal failure, stage 2 to 3.  I am going to repeat the kidney function.  Hypertension, okay.  Kidney pain, stable.  BPH, on medication, doing good.  Follow-up appointment probably in six weeks, but today's blood work we will communicate over the phone.      Kindly review this note in conjunction with EMR.   Subjective   Patient ID: Charles Chan is a 78 y.o. male who presents for Follow-up.      HPI    Review of  Systems    Objective   /72   Ht 1.829 m (6')   Wt 109 kg (240 lb 6.4 oz)   BMI 32.60 kg/m²       Physical Exam    Assessment/Plan   Problem List Items Addressed This Visit       Abdominal pain

## 2023-07-12 LAB — URINE CULTURE: NORMAL

## 2023-08-14 ENCOUNTER — LAB (OUTPATIENT)
Dept: LAB | Facility: LAB | Age: 78
End: 2023-08-14
Payer: MEDICARE

## 2023-08-14 DIAGNOSIS — I10 PRIMARY HYPERTENSION: ICD-10-CM

## 2023-08-14 DIAGNOSIS — E78.00 HYPERCHOLESTEROLEMIA: ICD-10-CM

## 2023-08-14 LAB
ALANINE AMINOTRANSFERASE (SGPT) (U/L) IN SER/PLAS: 11 U/L (ref 10–52)
ALBUMIN (G/DL) IN SER/PLAS: 3.7 G/DL (ref 3.4–5)
ALKALINE PHOSPHATASE (U/L) IN SER/PLAS: 78 U/L (ref 33–136)
ANION GAP IN SER/PLAS: 14 MMOL/L (ref 10–20)
ASPARTATE AMINOTRANSFERASE (SGOT) (U/L) IN SER/PLAS: 14 U/L (ref 9–39)
BILIRUBIN TOTAL (MG/DL) IN SER/PLAS: 0.6 MG/DL (ref 0–1.2)
CALCIUM (MG/DL) IN SER/PLAS: 8.4 MG/DL (ref 8.6–10.3)
CARBON DIOXIDE, TOTAL (MMOL/L) IN SER/PLAS: 23 MMOL/L (ref 21–32)
CHLORIDE (MMOL/L) IN SER/PLAS: 106 MMOL/L (ref 98–107)
CREATININE (MG/DL) IN SER/PLAS: 1.98 MG/DL (ref 0.5–1.3)
ERYTHROCYTE DISTRIBUTION WIDTH (RATIO) BY AUTOMATED COUNT: 12.1 % (ref 11.5–14.5)
ERYTHROCYTE MEAN CORPUSCULAR HEMOGLOBIN CONCENTRATION (G/DL) BY AUTOMATED: 32.3 G/DL (ref 32–36)
ERYTHROCYTE MEAN CORPUSCULAR VOLUME (FL) BY AUTOMATED COUNT: 90 FL (ref 80–100)
ERYTHROCYTES (10*6/UL) IN BLOOD BY AUTOMATED COUNT: 4.36 X10E12/L (ref 4.5–5.9)
GFR MALE: 33 ML/MIN/1.73M2
GLUCOSE (MG/DL) IN SER/PLAS: 94 MG/DL (ref 74–99)
HEMATOCRIT (%) IN BLOOD BY AUTOMATED COUNT: 39.3 % (ref 41–52)
HEMOGLOBIN (G/DL) IN BLOOD: 12.7 G/DL (ref 13.5–17.5)
LEUKOCYTES (10*3/UL) IN BLOOD BY AUTOMATED COUNT: 7.8 X10E9/L (ref 4.4–11.3)
PLATELETS (10*3/UL) IN BLOOD AUTOMATED COUNT: 249 X10E9/L (ref 150–450)
POTASSIUM (MMOL/L) IN SER/PLAS: 4.5 MMOL/L (ref 3.5–5.3)
PROTEIN TOTAL: 6.6 G/DL (ref 6.4–8.2)
SODIUM (MMOL/L) IN SER/PLAS: 138 MMOL/L (ref 136–145)
UREA NITROGEN (MG/DL) IN SER/PLAS: 26 MG/DL (ref 6–23)

## 2023-08-14 PROCEDURE — 85027 COMPLETE CBC AUTOMATED: CPT

## 2023-08-14 PROCEDURE — 36415 COLL VENOUS BLD VENIPUNCTURE: CPT

## 2023-08-14 PROCEDURE — 80053 COMPREHEN METABOLIC PANEL: CPT

## 2023-08-19 ENCOUNTER — OFFICE VISIT (OUTPATIENT)
Dept: PRIMARY CARE | Facility: CLINIC | Age: 78
End: 2023-08-19
Payer: MEDICARE

## 2023-08-19 VITALS
WEIGHT: 241 LBS | DIASTOLIC BLOOD PRESSURE: 72 MMHG | BODY MASS INDEX: 32.64 KG/M2 | HEIGHT: 72 IN | SYSTOLIC BLOOD PRESSURE: 130 MMHG

## 2023-08-19 DIAGNOSIS — Z12.5 ENCOUNTER FOR PROSTATE CANCER SCREENING: ICD-10-CM

## 2023-08-19 DIAGNOSIS — I10 ESSENTIAL (PRIMARY) HYPERTENSION: ICD-10-CM

## 2023-08-19 DIAGNOSIS — R73.03 PRE-DIABETES: ICD-10-CM

## 2023-08-19 DIAGNOSIS — E78.00 HYPERCHOLESTEROLEMIA: ICD-10-CM

## 2023-08-19 PROCEDURE — 3078F DIAST BP <80 MM HG: CPT | Performed by: INTERNAL MEDICINE

## 2023-08-19 PROCEDURE — 3075F SYST BP GE 130 - 139MM HG: CPT | Performed by: INTERNAL MEDICINE

## 2023-08-19 PROCEDURE — 99213 OFFICE O/P EST LOW 20 MIN: CPT | Performed by: INTERNAL MEDICINE

## 2023-08-19 PROCEDURE — 1159F MED LIST DOCD IN RCRD: CPT | Performed by: INTERNAL MEDICINE

## 2023-08-19 RX ORDER — HYDRALAZINE HYDROCHLORIDE 25 MG/1
25 TABLET, FILM COATED ORAL 3 TIMES DAILY
Qty: 270 TABLET | Refills: 1 | Status: SHIPPED | OUTPATIENT
Start: 2023-08-19 | End: 2023-10-06

## 2023-08-19 ASSESSMENT — ENCOUNTER SYMPTOMS
LOSS OF SENSATION IN FEET: 0
OCCASIONAL FEELINGS OF UNSTEADINESS: 0
DEPRESSION: 0

## 2023-08-19 NOTE — PROGRESS NOTES
OFFICE NOTE    NAME OF THE PATIENT: Charles Chan    YOB: 1945    CHIEF COMPLAINT:  This gentleman today came here for follow-up on various conditions.  Overall, he is a happy person.  Appetite and weight are okay.  No chest pain or shortness of breath.  Taking medications regularly.  No side effects.  He came for follow-up.    PAST MEDICAL HISTORY:  Reviewed on EMR, unchanged.    CURRENT MEDICATIONS:  Reviewed on EMR, unchanged.  Plavix 75 mg, metoprolol 50 mg b.i.d., Flonase spray, amlodipine 10 mg, aspirin, famotidine 20 mg, Lasix 40 mg, vitamin B12, Alfuzosin 10 mg, finasteride 5 mg, simvastatin 40 mg, hydralazine, potassium, and vitamin D.    ALLERGIES:  Reviewed on EMR, unchanged.    SOCIAL HISTORY:  Reviewed on EMR, unchanged.  He does not smoke, does not drink alcohol.    FAMILY HISTORY:  Reviewed on EMR, unchanged.    REVIEW OF SYSTEMS:  All 12 systems reviewed and pertaining covered in history and physical.    PHYSICAL EXAMINATION  VITAL SIGNS:  As recorded and reviewed from EMR.  RESPIRATORY:  The patient had normal inspirations and expirations.  The breath sounds were equal bilaterally and clear to auscultation.  CARDIOVASCULAR:  The patient had S1 normal, split S2 without obvious rubs, clicks, or murmurs.    GASTROINTESTINAL:  There was no hepatosplenomegaly.  There were no palpable masses and no inguinal nodes.  EXTREMITIES:  Legs had no edema.  NEUROLOGIC:  The patient had normal cranial nerves.  The reflexes, sensory, and motor examination were grossly within normal limits.    LAB WORK:  Laboratory testing discussed.    ASSESSMENT AND PLAN:  Chronic renal failure stage 2 to 3, improving, stable.  Monitor.  Hypertension, excellent.  Cholesterol, excellent.  BPH, on medication.  Pre-diabetic.  I will keep an eye.  Repeat blood work in a month.  Continue to follow.  I encouraged him good habits, drinking more water, cutting down the salt and working on weight.    Kindly review this  note in conjunction with EMR.   Subjective   Patient ID: Charles Chan is a 78 y.o. male who presents for Follow-up.      HPI    Review of Systems    Objective   /72   Ht 1.829 m (6')   Wt 109 kg (241 lb)   BMI 32.69 kg/m²       Physical Exam    Assessment/Plan   Problem List Items Addressed This Visit    None  Visit Diagnoses       Essential (primary) hypertension

## 2023-09-11 ENCOUNTER — LAB (OUTPATIENT)
Dept: LAB | Facility: LAB | Age: 78
End: 2023-09-11
Payer: MEDICARE

## 2023-09-11 DIAGNOSIS — R73.03 PRE-DIABETES: ICD-10-CM

## 2023-09-11 DIAGNOSIS — I10 ESSENTIAL (PRIMARY) HYPERTENSION: ICD-10-CM

## 2023-09-11 DIAGNOSIS — Z12.5 ENCOUNTER FOR PROSTATE CANCER SCREENING: ICD-10-CM

## 2023-09-11 DIAGNOSIS — E78.00 HYPERCHOLESTEROLEMIA: ICD-10-CM

## 2023-09-11 LAB
ALANINE AMINOTRANSFERASE (SGPT) (U/L) IN SER/PLAS: 12 U/L (ref 10–52)
ALBUMIN (G/DL) IN SER/PLAS: 3.7 G/DL (ref 3.4–5)
ALKALINE PHOSPHATASE (U/L) IN SER/PLAS: 77 U/L (ref 33–136)
ANION GAP IN SER/PLAS: 13 MMOL/L (ref 10–20)
ASPARTATE AMINOTRANSFERASE (SGOT) (U/L) IN SER/PLAS: 15 U/L (ref 9–39)
BILIRUBIN TOTAL (MG/DL) IN SER/PLAS: 0.6 MG/DL (ref 0–1.2)
CALCIUM (MG/DL) IN SER/PLAS: 8.8 MG/DL (ref 8.6–10.3)
CARBON DIOXIDE, TOTAL (MMOL/L) IN SER/PLAS: 24 MMOL/L (ref 21–32)
CHLORIDE (MMOL/L) IN SER/PLAS: 109 MMOL/L (ref 98–107)
CHOLESTEROL (MG/DL) IN SER/PLAS: 122 MG/DL (ref 0–199)
CHOLESTEROL IN HDL (MG/DL) IN SER/PLAS: 43.6 MG/DL
CHOLESTEROL/HDL RATIO: 2.8
CREATININE (MG/DL) IN SER/PLAS: 1.88 MG/DL (ref 0.5–1.3)
GFR MALE: 36 ML/MIN/1.73M2
GLUCOSE (MG/DL) IN SER/PLAS: 88 MG/DL (ref 74–99)
LDL: 65 MG/DL (ref 0–99)
POTASSIUM (MMOL/L) IN SER/PLAS: 4.5 MMOL/L (ref 3.5–5.3)
PROTEIN TOTAL: 6.3 G/DL (ref 6.4–8.2)
SODIUM (MMOL/L) IN SER/PLAS: 141 MMOL/L (ref 136–145)
TRIGLYCERIDE (MG/DL) IN SER/PLAS: 69 MG/DL (ref 0–149)
UREA NITROGEN (MG/DL) IN SER/PLAS: 24 MG/DL (ref 6–23)
VLDL: 14 MG/DL (ref 0–40)

## 2023-09-11 PROCEDURE — G0103 PSA SCREENING: HCPCS

## 2023-09-11 PROCEDURE — 83036 HEMOGLOBIN GLYCOSYLATED A1C: CPT

## 2023-09-11 PROCEDURE — 80061 LIPID PANEL: CPT

## 2023-09-11 PROCEDURE — 80053 COMPREHEN METABOLIC PANEL: CPT

## 2023-09-11 PROCEDURE — 84443 ASSAY THYROID STIM HORMONE: CPT

## 2023-09-11 PROCEDURE — 36415 COLL VENOUS BLD VENIPUNCTURE: CPT

## 2023-09-12 LAB
ESTIMATED AVERAGE GLUCOSE FOR HBA1C: 103 MG/DL
HEMOGLOBIN A1C/HEMOGLOBIN TOTAL IN BLOOD: 5.2 %
PROSTATE SPECIFIC ANTIGEN,SCREEN: 0.44 NG/ML (ref 0–4)
THYROTROPIN (MIU/L) IN SER/PLAS BY DETECTION LIMIT <= 0.05 MIU/L: 2.03 MIU/L (ref 0.44–3.98)

## 2023-09-16 ENCOUNTER — OFFICE VISIT (OUTPATIENT)
Dept: PRIMARY CARE | Facility: CLINIC | Age: 78
End: 2023-09-16
Payer: MEDICARE

## 2023-09-16 VITALS
WEIGHT: 243 LBS | SYSTOLIC BLOOD PRESSURE: 134 MMHG | BODY MASS INDEX: 32.91 KG/M2 | DIASTOLIC BLOOD PRESSURE: 72 MMHG | HEIGHT: 72 IN

## 2023-09-16 DIAGNOSIS — N18.2 CHRONIC RENAL FAILURE, STAGE 2 (MILD): ICD-10-CM

## 2023-09-16 DIAGNOSIS — E78.00 HYPERCHOLESTEROLEMIA: ICD-10-CM

## 2023-09-16 DIAGNOSIS — R07.9 CHEST PAIN, UNSPECIFIED TYPE: Primary | ICD-10-CM

## 2023-09-16 DIAGNOSIS — Z00.00 HEALTHCARE MAINTENANCE: ICD-10-CM

## 2023-09-16 DIAGNOSIS — I10 HYPERTENSION, UNSPECIFIED TYPE: ICD-10-CM

## 2023-09-16 DIAGNOSIS — R73.03 PREDIABETES: ICD-10-CM

## 2023-09-16 PROCEDURE — 1159F MED LIST DOCD IN RCRD: CPT | Performed by: INTERNAL MEDICINE

## 2023-09-16 PROCEDURE — 99213 OFFICE O/P EST LOW 20 MIN: CPT | Performed by: INTERNAL MEDICINE

## 2023-09-16 PROCEDURE — 3075F SYST BP GE 130 - 139MM HG: CPT | Performed by: INTERNAL MEDICINE

## 2023-09-16 PROCEDURE — 3078F DIAST BP <80 MM HG: CPT | Performed by: INTERNAL MEDICINE

## 2023-09-16 ASSESSMENT — ENCOUNTER SYMPTOMS
OCCASIONAL FEELINGS OF UNSTEADINESS: 0
LOSS OF SENSATION IN FEET: 0
DEPRESSION: 0

## 2023-09-16 NOTE — PROGRESS NOTES
Subjective   Patient ID: Charles Chan is a 78 y.o. male who presents for Follow-up.    1. Mr. Charles Chan today came here for follow-up on various conditions.  New problem, he has chest pain, dyspnea on exertion.  In his words, when he goes from my office to parking lot, he gets chest pain, short-winded, easy fatigued.  2. Follow-up on blood work and other conditions.  Appetite and weight are okay.  No problem.    I have personally reviewed the patient's Past Medical History, Medications, Allergies, Social History, and Family History in the EMR.    Review of Systems   All other systems reviewed and are negative.    Objective   /72   Ht 1.829 m (6')   Wt 110 kg (243 lb)   BMI 32.96 kg/m²     Physical Exam  Vitals reviewed.   HENT:      Right Ear: Tympanic membrane, ear canal and external ear normal.      Left Ear: Tympanic membrane, ear canal and external ear normal.   Eyes:      General: No scleral icterus.     Pupils: Pupils are equal, round, and reactive to light.   Neck:      Vascular: No carotid bruit.   Cardiovascular:      Heart sounds: Normal heart sounds, S1 normal and S2 normal. No murmur heard.     No friction rub.   Pulmonary:      Effort: Pulmonary effort is normal.      Breath sounds: Normal breath sounds and air entry.   Abdominal:      Palpations: There is no hepatomegaly, splenomegaly or mass.   Musculoskeletal:         General: No swelling or deformity. Normal range of motion.      Cervical back: Neck supple.      Right lower leg: No edema.      Left lower leg: No edema.   Lymphadenopathy:      Cervical: No cervical adenopathy.      Upper Body:      Right upper body: No axillary adenopathy.      Left upper body: No axillary adenopathy.      Lower Body: No right inguinal adenopathy. No left inguinal adenopathy.   Neurological:      Mental Status: He is oriented to person, place, and time.      Cranial Nerves: Cranial nerves 2-12 are intact. No cranial nerve deficit.      Sensory: No  sensory deficit.      Motor: Motor function is intact. No weakness.      Gait: Gait is intact.      Deep Tendon Reflexes: Reflexes normal.   Psychiatric:         Mood and Affect: Mood normal. Mood is not anxious or depressed. Affect is not angry.         Behavior: Behavior is not agitated.         Thought Content: Thought content normal.         Judgment: Judgment normal.     LAB WORK: Laboratory testing discussed.    Assessment/Plan   Problem List Items Addressed This Visit          Cardiac and Vasculature    Hypertension    Hypercholesterolemia    Relevant Orders    Comprehensive metabolic panel     Other Visit Diagnoses       Chest pain, unspecified type    -  Primary    Relevant Orders    Referral to Cardiology    Healthcare maintenance        Relevant Orders    CT cardiac scoring wo IV contrast    Chronic renal failure, stage 2 (mild)            1. Chest pain, rule out MI.  2. Angina.  Referred to cardiologist for evaluation. Calcium score ordered.  3. Chronic renal failure stage 2 to 3.  Monitor kidney.  4. Hypertension, okay.  5. High cholesterol, stable.  6. Pre-diabetic, stable.  7. I shall see him back in a week after the cardiac checkup.    Scribe Attestation  By signing my name below, ILeonie, Scrtj   attest that this documentation has been prepared under the direction and in the presence of Moi Pryor MD.

## 2023-09-18 PROBLEM — R73.03 PREDIABETES: Status: ACTIVE | Noted: 2023-09-18

## 2023-09-18 PROBLEM — N18.2 CHRONIC RENAL FAILURE, STAGE 2 (MILD): Status: ACTIVE | Noted: 2023-09-18

## 2023-09-18 PROBLEM — R07.9 CHEST PAIN: Status: ACTIVE | Noted: 2023-09-18

## 2023-09-30 DIAGNOSIS — I25.10 ATHEROSCLEROTIC HEART DISEASE OF NATIVE CORONARY ARTERY WITHOUT ANGINA PECTORIS: ICD-10-CM

## 2023-09-30 DIAGNOSIS — Z87.438 PERSONAL HISTORY OF OTHER DISEASES OF MALE GENITAL ORGANS: ICD-10-CM

## 2023-09-30 RX ORDER — METOPROLOL TARTRATE 50 MG/1
50 TABLET ORAL 2 TIMES DAILY
Qty: 180 TABLET | Refills: 3 | Status: SHIPPED | OUTPATIENT
Start: 2023-09-30 | End: 2023-10-06

## 2023-09-30 RX ORDER — ALFUZOSIN HYDROCHLORIDE 10 MG/1
TABLET, EXTENDED RELEASE ORAL
Qty: 90 TABLET | Refills: 1 | Status: SHIPPED | OUTPATIENT
Start: 2023-09-30 | End: 2024-04-01

## 2023-10-01 DIAGNOSIS — E55.9 VITAMIN D DEFICIENCY, UNSPECIFIED: ICD-10-CM

## 2023-10-01 DIAGNOSIS — G45.9 TIA (TRANSIENT ISCHEMIC ATTACK): ICD-10-CM

## 2023-10-01 RX ORDER — ACETAMINOPHEN 500 MG
5000 TABLET ORAL DAILY
Qty: 90 TABLET | Refills: 2 | Status: SHIPPED | OUTPATIENT
Start: 2023-10-01

## 2023-10-01 RX ORDER — CLOPIDOGREL BISULFATE 75 MG/1
TABLET ORAL
Qty: 90 TABLET | Refills: 0 | Status: SHIPPED | OUTPATIENT
Start: 2023-10-01 | End: 2024-01-04

## 2023-10-05 DIAGNOSIS — Z00.00 ENCOUNTER FOR GENERAL ADULT MEDICAL EXAMINATION WITHOUT ABNORMAL FINDINGS: ICD-10-CM

## 2023-10-05 RX ORDER — AMLODIPINE BESYLATE 10 MG/1
TABLET ORAL
Qty: 90 TABLET | Refills: 0 | Status: SHIPPED | OUTPATIENT
Start: 2023-10-05 | End: 2024-01-04

## 2023-10-06 ENCOUNTER — OFFICE VISIT (OUTPATIENT)
Dept: CARDIOLOGY | Facility: CLINIC | Age: 78
End: 2023-10-06
Payer: MEDICARE

## 2023-10-06 ENCOUNTER — HOSPITAL ENCOUNTER (OUTPATIENT)
Dept: CARDIOLOGY | Facility: CLINIC | Age: 78
Discharge: HOME | End: 2023-10-06
Payer: MEDICARE

## 2023-10-06 VITALS
DIASTOLIC BLOOD PRESSURE: 50 MMHG | HEART RATE: 44 BPM | OXYGEN SATURATION: 97 % | WEIGHT: 238.4 LBS | BODY MASS INDEX: 32.33 KG/M2 | SYSTOLIC BLOOD PRESSURE: 114 MMHG

## 2023-10-06 DIAGNOSIS — I10 PRIMARY HYPERTENSION: ICD-10-CM

## 2023-10-06 DIAGNOSIS — I25.10 ATHEROSCLEROTIC HEART DISEASE OF NATIVE CORONARY ARTERY WITHOUT ANGINA PECTORIS: ICD-10-CM

## 2023-10-06 DIAGNOSIS — R06.02 SHORTNESS OF BREATH: ICD-10-CM

## 2023-10-06 DIAGNOSIS — R00.1 BRADYCARDIA: ICD-10-CM

## 2023-10-06 DIAGNOSIS — R06.02 SHORTNESS OF BREATH: Primary | ICD-10-CM

## 2023-10-06 PROCEDURE — 1126F AMNT PAIN NOTED NONE PRSNT: CPT | Performed by: INTERNAL MEDICINE

## 2023-10-06 PROCEDURE — 1159F MED LIST DOCD IN RCRD: CPT | Performed by: INTERNAL MEDICINE

## 2023-10-06 PROCEDURE — 93005 ELECTROCARDIOGRAM TRACING: CPT

## 2023-10-06 PROCEDURE — 3078F DIAST BP <80 MM HG: CPT | Performed by: INTERNAL MEDICINE

## 2023-10-06 PROCEDURE — 1036F TOBACCO NON-USER: CPT | Performed by: INTERNAL MEDICINE

## 2023-10-06 PROCEDURE — 99214 OFFICE O/P EST MOD 30 MIN: CPT | Performed by: INTERNAL MEDICINE

## 2023-10-06 PROCEDURE — 99214 OFFICE O/P EST MOD 30 MIN: CPT | Mod: PO | Performed by: INTERNAL MEDICINE

## 2023-10-06 PROCEDURE — 3074F SYST BP LT 130 MM HG: CPT | Performed by: INTERNAL MEDICINE

## 2023-10-06 RX ORDER — METOPROLOL SUCCINATE 50 MG/1
50 TABLET, EXTENDED RELEASE ORAL DAILY
Qty: 30 TABLET | Refills: 11 | Status: SHIPPED | OUTPATIENT
Start: 2023-10-06 | End: 2023-11-01

## 2023-10-06 ASSESSMENT — PAIN SCALES - GENERAL: PAINLEVEL: 0-NO PAIN

## 2023-10-06 ASSESSMENT — PATIENT HEALTH QUESTIONNAIRE - PHQ9
SUM OF ALL RESPONSES TO PHQ9 QUESTIONS 1 & 2: 0
2. FEELING DOWN, DEPRESSED OR HOPELESS: NOT AT ALL
1. LITTLE INTEREST OR PLEASURE IN DOING THINGS: NOT AT ALL

## 2023-10-06 ASSESSMENT — LIFESTYLE VARIABLES
HOW OFTEN DO YOU HAVE SIX OR MORE DRINKS ON ONE OCCASION: NEVER
HOW MANY STANDARD DRINKS CONTAINING ALCOHOL DO YOU HAVE ON A TYPICAL DAY: PATIENT DOES NOT DRINK
HOW OFTEN DO YOU HAVE A DRINK CONTAINING ALCOHOL: NEVER
AUDIT-C TOTAL SCORE: 0
SKIP TO QUESTIONS 9-10: 1

## 2023-10-06 ASSESSMENT — ENCOUNTER SYMPTOMS
OCCASIONAL FEELINGS OF UNSTEADINESS: 1
DEPRESSION: 0

## 2023-10-08 NOTE — PROGRESS NOTES
Subjective   Charles Chan is a 78 y.o. male.    Chief Complaint:  No chief complaint on file.    HPI    Review of Systems   All other systems reviewed and are negative.      Objective   Cardiovascular:      PMI at left midclavicular line. Normal rate. Regular rhythm. Normal S1. Normal S2.       Murmurs: There is no murmur.      No gallop.  No click. No rub.   Pulses:     Intact distal pulses.   Edema:     Peripheral edema absent.         Lab Review:   Lab on 09/11/2023   Component Date Value    Glucose 09/11/2023 88     Sodium 09/11/2023 141     Potassium 09/11/2023 4.5     Chloride 09/11/2023 109 (H)     Bicarbonate 09/11/2023 24     Anion Gap 09/11/2023 13     Urea Nitrogen 09/11/2023 24 (H)     Creatinine 09/11/2023 1.88 (H)     GFR MALE 09/11/2023 36 (A)     Calcium 09/11/2023 8.8     Albumin 09/11/2023 3.7     Alkaline Phosphatase 09/11/2023 77     Total Protein 09/11/2023 6.3 (L)     AST 09/11/2023 15     Total Bilirubin 09/11/2023 0.6     ALT (SGPT) 09/11/2023 12     Cholesterol 09/11/2023 122     HDL 09/11/2023 43.6     Cholesterol/HDL Ratio 09/11/2023 2.8     LDL 09/11/2023 65     VLDL 09/11/2023 14     Triglycerides 09/11/2023 69     TSH 09/11/2023 2.03     Hemoglobin A1C 09/11/2023 5.2     Estimated Average Glucose 09/11/2023 103     Prostate Specific Antige* 09/11/2023 0.44    Lab on 08/14/2023   Component Date Value    WBC 08/14/2023 7.8     RBC 08/14/2023 4.36 (L)     Hemoglobin 08/14/2023 12.7 (L)     Hematocrit 08/14/2023 39.3 (L)     MCV 08/14/2023 90     MCHC 08/14/2023 32.3     Platelets 08/14/2023 249     RDW 08/14/2023 12.1     Glucose 08/14/2023 94     Sodium 08/14/2023 138     Potassium 08/14/2023 4.5     Chloride 08/14/2023 106     Bicarbonate 08/14/2023 23     Anion Gap 08/14/2023 14     Urea Nitrogen 08/14/2023 26 (H)     Creatinine 08/14/2023 1.98 (H)     GFR MALE 08/14/2023 33 (A)     Calcium 08/14/2023 8.4 (L)     Albumin 08/14/2023 3.7     Alkaline Phosphatase 08/14/2023 78     Total  Protein 08/14/2023 6.6     AST 08/14/2023 14     Total Bilirubin 08/14/2023 0.6     ALT (SGPT) 08/14/2023 11    Lab on 07/08/2023   Component Date Value    WBC 07/08/2023 8.9     nRBC 07/08/2023 0.0     RBC 07/08/2023 4.55     Hemoglobin 07/08/2023 13.2 (L)     Hematocrit 07/08/2023 40.7 (L)     MCV 07/08/2023 89     MCHC 07/08/2023 32.4     Platelets 07/08/2023 258     RDW 07/08/2023 12.3     Glucose 07/08/2023 103 (H)     Sodium 07/08/2023 139     Potassium 07/08/2023 5.0     Chloride 07/08/2023 107     Bicarbonate 07/08/2023 25     Anion Gap 07/08/2023 12     Urea Nitrogen 07/08/2023 37 (H)     Creatinine 07/08/2023 2.09 (H)     GFR MALE 07/08/2023 31 (A)     Calcium 07/08/2023 9.6     Albumin 07/08/2023 4.0     Alkaline Phosphatase 07/08/2023 78     Total Protein 07/08/2023 7.0     AST 07/08/2023 13     Total Bilirubin 07/08/2023 0.8     ALT (SGPT) 07/08/2023 10     Color, Urine 07/08/2023 YELLOW     Appearance, Urine 07/08/2023 CLEAR     Specific Gravity, Urine 07/08/2023 1.021     pH, Urine 07/08/2023 5.0     Protein, Urine 07/08/2023 30 (1+) (A)     Glucose, Urine 07/08/2023 NEGATIVE     Blood, Urine 07/08/2023 NEGATIVE     Ketones, Urine 07/08/2023 NEGATIVE     Bilirubin, Urine 07/08/2023 NEGATIVE     Urobilinogen, Urine 07/08/2023 <2.0     Nitrite, Urine 07/08/2023 NEGATIVE     Leukocyte Esterase, Urine 07/08/2023 NEGATIVE     Urine Culture 07/08/2023 ***Culture Comments - See Below     WBC, Urine 07/08/2023 <1     RBC, Urine 07/08/2023 1     Mucus, Urine 07/08/2023 1+     Hyaline Casts, Urine 07/08/2023 OCC (A)    Lab on 06/03/2023   Component Date Value    Glucose 06/03/2023 98     Sodium 06/03/2023 139     Potassium 06/03/2023 4.5     Chloride 06/03/2023 107     Bicarbonate 06/03/2023 24     Anion Gap 06/03/2023 13     Urea Nitrogen 06/03/2023 29 (H)     Creatinine 06/03/2023 2.39 (H)     GFR MALE 06/03/2023 27 (A)     Calcium 06/03/2023 8.9     Albumin 06/03/2023 3.9     Alkaline Phosphatase 06/03/2023  82     Total Protein 06/03/2023 6.6     AST 06/03/2023 18     Total Bilirubin 06/03/2023 1.0     ALT (SGPT) 06/03/2023 13    Lab on 04/21/2023   Component Date Value    Glucose 04/21/2023 83     Sodium 04/21/2023 139     Potassium 04/21/2023 4.4     Chloride 04/21/2023 106     Bicarbonate 04/21/2023 26     Anion Gap 04/21/2023 11     Urea Nitrogen 04/21/2023 29 (H)     Creatinine 04/21/2023 2.42 (H)     GFR MALE 04/21/2023 26 (A)     Calcium 04/21/2023 8.9        Assessment/Plan   The primary encounter diagnosis was Shortness of breath. Diagnoses of Bradycardia, Atherosclerotic heart disease of native coronary artery without angina pectoris, and Primary hypertension were also pertinent to this visit.  1. CAD with unstable angina May 2009.  2. CAD with PCI and drug eluting stent to the mid and distal LCX and   proximal RCA May 18, 2009. Please refer to prior office notes for   review.  3. Repeat cardiac catheterization February 8, 2010 with stents widely patent.  The patient is complaining of increasing shortness of breath particularly when climbing stairs as well as lightheadedness when standing up quickly.  As discussed below his heart rate is slow and blood pressure low related to medication.  His dose of metoprolol will be reduced and hydralazine discontinued.  He will return in 8 weeks for follow-up visit.  4. Hypertension.  The patient's blood pressure is well within normal range and he is relatively bradycardic.  EKG today 10/6/2023 demonstrates a sinus bradycardia at 38/min first-degree AV block TN interval 226 ms.  The patient's metoprolol tartrate will be reduced from 50 mg twice daily to 25 mg twice daily and subsequently changed to Toprol-XL 50 mg daily.  Patient will stop hydralazine 25 mg 3 times daily.  Return to office in 8 weeks.      5. Renal insufficiency. Recent creatinine 1.88 when checked on 9/11/2023      6. Remote former history of tobacco use with cessation since 1991.  7. Hyperlipidemia.  Recent lipid panel showed a total cholesterol 122 LDL 65 HDL 43 triglycerides 69.  Labs were performed 9/11/2023.  8. Questionable left adrenal adenoma.  9. Mild DJD of the right shoulder. Patient will continue to follow up with Dr. Dick   10.BPH.  11. GERD

## 2023-10-08 NOTE — PROGRESS NOTES
Subjective   Charles Chan is a 78 y.o. male.    Chief Complaint:  No chief complaint on file.    HPI    Review of Systems   All other systems reviewed and are negative.      Objective   Cardiovascular:      PMI at left midclavicular line. Normal rate. Regular rhythm. Normal S1. Normal S2.       Murmurs: There is no murmur.      No gallop.  No click. No rub.   Pulses:     Intact distal pulses.   Edema:     Peripheral edema absent.         Lab Review:   Lab on 09/11/2023   Component Date Value    Glucose 09/11/2023 88     Sodium 09/11/2023 141     Potassium 09/11/2023 4.5     Chloride 09/11/2023 109 (H)     Bicarbonate 09/11/2023 24     Anion Gap 09/11/2023 13     Urea Nitrogen 09/11/2023 24 (H)     Creatinine 09/11/2023 1.88 (H)     GFR MALE 09/11/2023 36 (A)     Calcium 09/11/2023 8.8     Albumin 09/11/2023 3.7     Alkaline Phosphatase 09/11/2023 77     Total Protein 09/11/2023 6.3 (L)     AST 09/11/2023 15     Total Bilirubin 09/11/2023 0.6     ALT (SGPT) 09/11/2023 12     Cholesterol 09/11/2023 122     HDL 09/11/2023 43.6     Cholesterol/HDL Ratio 09/11/2023 2.8     LDL 09/11/2023 65     VLDL 09/11/2023 14     Triglycerides 09/11/2023 69     TSH 09/11/2023 2.03     Hemoglobin A1C 09/11/2023 5.2     Estimated Average Glucose 09/11/2023 103     Prostate Specific Antige* 09/11/2023 0.44    Lab on 08/14/2023   Component Date Value    WBC 08/14/2023 7.8     RBC 08/14/2023 4.36 (L)     Hemoglobin 08/14/2023 12.7 (L)     Hematocrit 08/14/2023 39.3 (L)     MCV 08/14/2023 90     MCHC 08/14/2023 32.3     Platelets 08/14/2023 249     RDW 08/14/2023 12.1     Glucose 08/14/2023 94     Sodium 08/14/2023 138     Potassium 08/14/2023 4.5     Chloride 08/14/2023 106     Bicarbonate 08/14/2023 23     Anion Gap 08/14/2023 14     Urea Nitrogen 08/14/2023 26 (H)     Creatinine 08/14/2023 1.98 (H)     GFR MALE 08/14/2023 33 (A)     Calcium 08/14/2023 8.4 (L)     Albumin 08/14/2023 3.7     Alkaline Phosphatase 08/14/2023 78     Total  Protein 08/14/2023 6.6     AST 08/14/2023 14     Total Bilirubin 08/14/2023 0.6     ALT (SGPT) 08/14/2023 11    Lab on 07/08/2023   Component Date Value    WBC 07/08/2023 8.9     nRBC 07/08/2023 0.0     RBC 07/08/2023 4.55     Hemoglobin 07/08/2023 13.2 (L)     Hematocrit 07/08/2023 40.7 (L)     MCV 07/08/2023 89     MCHC 07/08/2023 32.4     Platelets 07/08/2023 258     RDW 07/08/2023 12.3     Glucose 07/08/2023 103 (H)     Sodium 07/08/2023 139     Potassium 07/08/2023 5.0     Chloride 07/08/2023 107     Bicarbonate 07/08/2023 25     Anion Gap 07/08/2023 12     Urea Nitrogen 07/08/2023 37 (H)     Creatinine 07/08/2023 2.09 (H)     GFR MALE 07/08/2023 31 (A)     Calcium 07/08/2023 9.6     Albumin 07/08/2023 4.0     Alkaline Phosphatase 07/08/2023 78     Total Protein 07/08/2023 7.0     AST 07/08/2023 13     Total Bilirubin 07/08/2023 0.8     ALT (SGPT) 07/08/2023 10     Color, Urine 07/08/2023 YELLOW     Appearance, Urine 07/08/2023 CLEAR     Specific Gravity, Urine 07/08/2023 1.021     pH, Urine 07/08/2023 5.0     Protein, Urine 07/08/2023 30 (1+) (A)     Glucose, Urine 07/08/2023 NEGATIVE     Blood, Urine 07/08/2023 NEGATIVE     Ketones, Urine 07/08/2023 NEGATIVE     Bilirubin, Urine 07/08/2023 NEGATIVE     Urobilinogen, Urine 07/08/2023 <2.0     Nitrite, Urine 07/08/2023 NEGATIVE     Leukocyte Esterase, Urine 07/08/2023 NEGATIVE     Urine Culture 07/08/2023 Culture Comments - See Below     WBC, Urine 07/08/2023 <1     RBC, Urine 07/08/2023 1     Mucus, Urine 07/08/2023 1+     Hyaline Casts, Urine 07/08/2023 OCC (A)    Lab on 06/03/2023   Component Date Value    Glucose 06/03/2023 98     Sodium 06/03/2023 139     Potassium 06/03/2023 4.5     Chloride 06/03/2023 107     Bicarbonate 06/03/2023 24     Anion Gap 06/03/2023 13     Urea Nitrogen 06/03/2023 29 (H)     Creatinine 06/03/2023 2.39 (H)     GFR MALE 06/03/2023 27 (A)     Calcium 06/03/2023 8.9     Albumin 06/03/2023 3.9     Alkaline Phosphatase 06/03/2023 82      Total Protein 06/03/2023 6.6     AST 06/03/2023 18     Total Bilirubin 06/03/2023 1.0     ALT (SGPT) 06/03/2023 13    Lab on 04/21/2023   Component Date Value    Glucose 04/21/2023 83     Sodium 04/21/2023 139     Potassium 04/21/2023 4.4     Chloride 04/21/2023 106     Bicarbonate 04/21/2023 26     Anion Gap 04/21/2023 11     Urea Nitrogen 04/21/2023 29 (H)     Creatinine 04/21/2023 2.42 (H)     GFR MALE 04/21/2023 26 (A)     Calcium 04/21/2023 8.9        Assessment/Plan   he primary encounter diagnosis was Shortness of breath. Diagnoses of Bradycardia, Atherosclerotic heart disease of native coronary artery without angina pectoris, and Primary hypertension were also pertinent to this visit.  1. CAD with unstable angina May 2009.  2. CAD with PCI and drug eluting stent to the mid and distal LCX and   proximal RCA May 18, 2009. Please refer to prior office notes for   review.  3. Repeat cardiac catheterization February 8, 2010 with stents widely patent.  The patient is complaining of increasing shortness of breath particularly when climbing stairs as well as lightheadedness when standing up quickly.  As discussed below his heart rate is slow and blood pressure low related to medication.  His dose of metoprolol will be reduced and hydralazine discontinued.  He will return in 8 weeks for follow-up visit.  4. Hypertension.  The patient's blood pressure is well within normal range and he is relatively bradycardic.  EKG today 10/6/2023 demonstrates a sinus bradycardia at 38/min first-degree AV block ME interval 226 ms.  The patient's metoprolol tartrate will be reduced from 50 mg twice daily to 25 mg twice daily and subsequently changed to Toprol-XL 50 mg daily.  Patient will stop hydralazine 25 mg 3 times daily.  Return to office in 8 weeks.      5. Renal insufficiency. Recent creatinine 1.88 when checked on 9/11/2023      6. Remote former history of tobacco use with cessation since 1991.  7. Hyperlipidemia. Recent  lipid panel showed a total cholesterol 122 LDL 65 HDL 43 triglycerides 69.  Labs were performed 9/11/2023.  8. Questionable left adrenal adenoma.  9. Mild DJD of the right shoulder. Patient will continue to follow up with Dr. Dick   10.BPH.  11. GERD

## 2023-10-14 ENCOUNTER — OFFICE VISIT (OUTPATIENT)
Dept: PRIMARY CARE | Facility: CLINIC | Age: 78
End: 2023-10-14
Payer: MEDICARE

## 2023-10-14 ENCOUNTER — LAB (OUTPATIENT)
Dept: LAB | Facility: LAB | Age: 78
End: 2023-10-14
Payer: MEDICARE

## 2023-10-14 VITALS
HEIGHT: 72 IN | DIASTOLIC BLOOD PRESSURE: 72 MMHG | SYSTOLIC BLOOD PRESSURE: 130 MMHG | WEIGHT: 238 LBS | BODY MASS INDEX: 32.23 KG/M2

## 2023-10-14 DIAGNOSIS — I10 HYPERTENSION, UNSPECIFIED TYPE: ICD-10-CM

## 2023-10-14 DIAGNOSIS — E78.00 HYPERCHOLESTEROLEMIA: ICD-10-CM

## 2023-10-14 DIAGNOSIS — N18.2 CHRONIC RENAL FAILURE, STAGE 2 (MILD): Primary | ICD-10-CM

## 2023-10-14 DIAGNOSIS — Z23 FLU VACCINE NEED: ICD-10-CM

## 2023-10-14 DIAGNOSIS — Z91.09 OTHER ALLERGY STATUS, OTHER THAN TO DRUGS AND BIOLOGICAL SUBSTANCES: ICD-10-CM

## 2023-10-14 LAB
ALBUMIN SERPL BCP-MCNC: 4.2 G/DL (ref 3.4–5)
ALP SERPL-CCNC: 100 U/L (ref 33–136)
ALT SERPL W P-5'-P-CCNC: 11 U/L (ref 10–52)
ANION GAP SERPL CALC-SCNC: 13 MMOL/L (ref 10–20)
AST SERPL W P-5'-P-CCNC: 19 U/L (ref 9–39)
BILIRUB SERPL-MCNC: 0.9 MG/DL (ref 0–1.2)
BUN SERPL-MCNC: 41 MG/DL (ref 6–23)
CALCIUM SERPL-MCNC: 9.3 MG/DL (ref 8.6–10.6)
CHLORIDE SERPL-SCNC: 107 MMOL/L (ref 98–107)
CO2 SERPL-SCNC: 25 MMOL/L (ref 21–32)
CREAT SERPL-MCNC: 2.56 MG/DL (ref 0.5–1.3)
GFR SERPL CREATININE-BSD FRML MDRD: 25 ML/MIN/1.73M*2
GLUCOSE SERPL-MCNC: 100 MG/DL (ref 74–99)
POTASSIUM SERPL-SCNC: 4.5 MMOL/L (ref 3.5–5.3)
PROT SERPL-MCNC: 7.2 G/DL (ref 6.4–8.2)
SODIUM SERPL-SCNC: 140 MMOL/L (ref 136–145)

## 2023-10-14 PROCEDURE — 80053 COMPREHEN METABOLIC PANEL: CPT

## 2023-10-14 PROCEDURE — 99213 OFFICE O/P EST LOW 20 MIN: CPT | Performed by: INTERNAL MEDICINE

## 2023-10-14 PROCEDURE — 36415 COLL VENOUS BLD VENIPUNCTURE: CPT

## 2023-10-14 PROCEDURE — 90662 IIV NO PRSV INCREASED AG IM: CPT | Performed by: INTERNAL MEDICINE

## 2023-10-14 PROCEDURE — 1126F AMNT PAIN NOTED NONE PRSNT: CPT | Performed by: INTERNAL MEDICINE

## 2023-10-14 PROCEDURE — 1159F MED LIST DOCD IN RCRD: CPT | Performed by: INTERNAL MEDICINE

## 2023-10-14 PROCEDURE — 1036F TOBACCO NON-USER: CPT | Performed by: INTERNAL MEDICINE

## 2023-10-14 PROCEDURE — 3078F DIAST BP <80 MM HG: CPT | Performed by: INTERNAL MEDICINE

## 2023-10-14 PROCEDURE — 3075F SYST BP GE 130 - 139MM HG: CPT | Performed by: INTERNAL MEDICINE

## 2023-10-14 PROCEDURE — G0008 ADMIN INFLUENZA VIRUS VAC: HCPCS | Performed by: INTERNAL MEDICINE

## 2023-10-14 RX ORDER — FLUTICASONE PROPIONATE 50 MCG
SPRAY, SUSPENSION (ML) NASAL
Qty: 16 ML | Refills: 1 | Status: SHIPPED | OUTPATIENT
Start: 2023-10-14 | End: 2023-10-18

## 2023-10-14 ASSESSMENT — ENCOUNTER SYMPTOMS
LOSS OF SENSATION IN FEET: 0
OCCASIONAL FEELINGS OF UNSTEADINESS: 0
DEPRESSION: 0

## 2023-10-14 NOTE — PROGRESS NOTES
Subjective   Patient ID: Charles Chan is a 78 y.o. male who presents for Follow-up (on various conditions.).     Mr. Chan today came here for follow-up appointment on various conditions.  1. He wants his flu shot.  2. Follow-up on renal function.  He saw Dr. Willis.  He streamlined his medication.  He made metoprolol once a day long-acting and he got rid of hydralazine.  Other than that things are okay.  Blood pressure okay.  No side effects.  He does not have much swelling.  Appetite and weight are okay.  No problem.    I have personally reviewed the patient's Past Medical History, Medications, Allergies, Social History, and Family History in the EMR.    Review of Systems   All other systems reviewed and are negative.    Objective   /72   Ht 1.829 m (6')   Wt 108 kg (238 lb)   BMI 32.28 kg/m²     Physical Exam  Vitals reviewed.   Cardiovascular:      Heart sounds: Normal heart sounds, S1 normal and S2 normal. No murmur heard.     No friction rub.   Pulmonary:      Effort: Pulmonary effort is normal.      Breath sounds: Normal breath sounds and air entry.   Abdominal:      Palpations: There is no hepatomegaly, splenomegaly or mass.   Musculoskeletal:      Right lower leg: No edema.      Left lower leg: No edema.   Lymphadenopathy:      Lower Body: No right inguinal adenopathy. No left inguinal adenopathy.   Neurological:      Cranial Nerves: Cranial nerves 2-12 are intact.      Sensory: No sensory deficit.      Motor: Motor function is intact.      Deep Tendon Reflexes: Reflexes are normal and symmetric.     LAB WORK: Laboratory testing discussed.    Assessment/Plan   Problem List Items Addressed This Visit             ICD-10-CM       Cardiac and Vasculature    Hypertension I10    Hypercholesterolemia - Primary E78.00    Relevant Orders    Comprehensive metabolic panel       Genitourinary and Reproductive    Chronic renal failure, stage 2 (mild) N18.2     Other Visit Diagnoses         Codes    Flu  vaccine need     Z23    Relevant Orders    Flu vaccine, quadrivalent, high-dose, preservative free, age 65y+ (FLUZONE) (Completed)        1. Chronic renal failure, stage 2 to 3.  We will maintain it.  Lasix once a day in the evening is more than enough.  I told him to take his dead weight morning and night.  He will keep a log.  I explained how Lasix works and thiamine in great length.  2. Hypertension, excellent.  3. Cholesterol.  Monitor.  4. Flu shot given.  5. Follow-up appointment with me in a month.  Happy to see him anytime sooner if necessary.    Scribe Attestation  By signing my name below, I, Gin Salazar attest that this documentation has been prepared under the direction and in the presence of Moi Pryor MD.

## 2023-10-20 ENCOUNTER — OFFICE VISIT (OUTPATIENT)
Dept: PRIMARY CARE | Facility: CLINIC | Age: 78
End: 2023-10-20
Payer: MEDICARE

## 2023-10-20 ENCOUNTER — LAB (OUTPATIENT)
Dept: LAB | Facility: LAB | Age: 78
End: 2023-10-20
Payer: MEDICARE

## 2023-10-20 VITALS
BODY MASS INDEX: 31.97 KG/M2 | HEIGHT: 72 IN | DIASTOLIC BLOOD PRESSURE: 64 MMHG | WEIGHT: 236 LBS | SYSTOLIC BLOOD PRESSURE: 128 MMHG

## 2023-10-20 DIAGNOSIS — E78.00 HYPERCHOLESTEROLEMIA: ICD-10-CM

## 2023-10-20 DIAGNOSIS — I10 PRIMARY HYPERTENSION: ICD-10-CM

## 2023-10-20 DIAGNOSIS — I10 PRIMARY HYPERTENSION: Primary | ICD-10-CM

## 2023-10-20 DIAGNOSIS — N18.2 CHRONIC RENAL FAILURE, STAGE 2 (MILD): ICD-10-CM

## 2023-10-20 LAB
ANION GAP SERPL CALC-SCNC: 13 MMOL/L (ref 10–20)
BUN SERPL-MCNC: 32 MG/DL (ref 6–23)
CALCIUM SERPL-MCNC: 9.7 MG/DL (ref 8.6–10.6)
CHLORIDE SERPL-SCNC: 108 MMOL/L (ref 98–107)
CO2 SERPL-SCNC: 25 MMOL/L (ref 21–32)
CREAT SERPL-MCNC: 2.2 MG/DL (ref 0.5–1.3)
GFR SERPL CREATININE-BSD FRML MDRD: 30 ML/MIN/1.73M*2
GLUCOSE SERPL-MCNC: 104 MG/DL (ref 74–99)
POTASSIUM SERPL-SCNC: 4.1 MMOL/L (ref 3.5–5.3)
SODIUM SERPL-SCNC: 142 MMOL/L (ref 136–145)

## 2023-10-20 PROCEDURE — 1126F AMNT PAIN NOTED NONE PRSNT: CPT | Performed by: INTERNAL MEDICINE

## 2023-10-20 PROCEDURE — 1159F MED LIST DOCD IN RCRD: CPT | Performed by: INTERNAL MEDICINE

## 2023-10-20 PROCEDURE — 1036F TOBACCO NON-USER: CPT | Performed by: INTERNAL MEDICINE

## 2023-10-20 PROCEDURE — 3074F SYST BP LT 130 MM HG: CPT | Performed by: INTERNAL MEDICINE

## 2023-10-20 PROCEDURE — 80048 BASIC METABOLIC PNL TOTAL CA: CPT

## 2023-10-20 PROCEDURE — 36415 COLL VENOUS BLD VENIPUNCTURE: CPT

## 2023-10-20 PROCEDURE — 3078F DIAST BP <80 MM HG: CPT | Performed by: INTERNAL MEDICINE

## 2023-10-20 PROCEDURE — 99213 OFFICE O/P EST LOW 20 MIN: CPT | Performed by: INTERNAL MEDICINE

## 2023-10-20 NOTE — PROGRESS NOTES
Subjective   Patient ID: Charles Chan is a 78 y.o. male who presents for Follow-up (multiple medical issues. ).    This gentleman today came here for multiple medical issues.  He is here for kidney function.  He has eliminated ACE and ARB.  He has cut down salt intake to minimum or zero.  He is eating right, exercising, drinking more water.  We are concerned about kidney.  He is feeling okay.  No problem.    I have personally reviewed the patient's Past Medical History, Medications, Allergies, Social History, and Family History in the EMR.    Review of Systems   All other systems reviewed and are negative.    Objective   /64   Ht 1.829 m (6')   Wt 107 kg (236 lb)   BMI 32.01 kg/m²     Physical Exam  Vitals reviewed.   Cardiovascular:      Heart sounds: Normal heart sounds, S1 normal and S2 normal. No murmur heard.     No friction rub.   Pulmonary:      Effort: Pulmonary effort is normal.      Breath sounds: Normal breath sounds and air entry.   Abdominal:      Palpations: There is no hepatomegaly, splenomegaly or mass.   Musculoskeletal:      Right lower leg: No edema.      Left lower leg: No edema.   Lymphadenopathy:      Lower Body: No right inguinal adenopathy. No left inguinal adenopathy.   Neurological:      Cranial Nerves: Cranial nerves 2-12 are intact.      Sensory: No sensory deficit.      Motor: Motor function is intact.      Deep Tendon Reflexes: Reflexes are normal and symmetric.     LAB WORK: Laboratory testing discussed.    Assessment/Plan   Problem List Items Addressed This Visit             ICD-10-CM       Cardiac and Vasculature    Hypertension - Primary I10    Relevant Orders    Basic metabolic panel    Hypercholesterolemia E78.00       Genitourinary and Reproductive    Chronic renal failure, stage 2 (mild) N18.2   1. Chronic renal failure, almost stage 3.  I am going to repeat the kidney function.  2. Hypertension, okay.  3. Cholesterol, monitor.  4. Not diabetic.  Test confirmed  again.  5. Follow up with me in about three to four weeks, but today’s blood work, I will communicate over the phone right away.    Scribe Attestation  By signing my name below, I, Gin Salazar attest that this documentation has been prepared under the direction and in the presence of Moi Pryor MD.

## 2023-10-31 DIAGNOSIS — I10 PRIMARY HYPERTENSION: ICD-10-CM

## 2023-10-31 DIAGNOSIS — R00.1 BRADYCARDIA: ICD-10-CM

## 2023-11-01 RX ORDER — METOPROLOL SUCCINATE 50 MG/1
50 TABLET, EXTENDED RELEASE ORAL DAILY
Qty: 90 TABLET | Refills: 4 | Status: SHIPPED | OUTPATIENT
Start: 2023-11-01 | End: 2023-12-08 | Stop reason: SDUPTHER

## 2023-11-11 ENCOUNTER — LAB (OUTPATIENT)
Dept: LAB | Facility: LAB | Age: 78
End: 2023-11-11
Payer: MEDICARE

## 2023-11-11 ENCOUNTER — OFFICE VISIT (OUTPATIENT)
Dept: PRIMARY CARE | Facility: CLINIC | Age: 78
End: 2023-11-11
Payer: MEDICARE

## 2023-11-11 VITALS
HEIGHT: 72 IN | WEIGHT: 241 LBS | DIASTOLIC BLOOD PRESSURE: 76 MMHG | SYSTOLIC BLOOD PRESSURE: 124 MMHG | BODY MASS INDEX: 32.64 KG/M2

## 2023-11-11 DIAGNOSIS — R73.03 PRE-DIABETES: ICD-10-CM

## 2023-11-11 DIAGNOSIS — E78.00 HYPERCHOLESTEROLEMIA: ICD-10-CM

## 2023-11-11 DIAGNOSIS — N18.2 CHRONIC RENAL FAILURE, STAGE 2 (MILD): Primary | ICD-10-CM

## 2023-11-11 DIAGNOSIS — I10 ESSENTIAL (PRIMARY) HYPERTENSION: ICD-10-CM

## 2023-11-11 LAB
ALBUMIN SERPL BCP-MCNC: 4 G/DL (ref 3.4–5)
ALP SERPL-CCNC: 91 U/L (ref 33–136)
ALT SERPL W P-5'-P-CCNC: 14 U/L (ref 10–52)
ANION GAP SERPL CALC-SCNC: 14 MMOL/L (ref 10–20)
AST SERPL W P-5'-P-CCNC: 15 U/L (ref 9–39)
BILIRUB SERPL-MCNC: 0.5 MG/DL (ref 0–1.2)
BUN SERPL-MCNC: 33 MG/DL (ref 6–23)
CALCIUM SERPL-MCNC: 9.1 MG/DL (ref 8.6–10.6)
CHLORIDE SERPL-SCNC: 106 MMOL/L (ref 98–107)
CO2 SERPL-SCNC: 25 MMOL/L (ref 21–32)
CREAT SERPL-MCNC: 2.05 MG/DL (ref 0.5–1.3)
GFR SERPL CREATININE-BSD FRML MDRD: 32 ML/MIN/1.73M*2
GLUCOSE SERPL-MCNC: 103 MG/DL (ref 74–99)
POTASSIUM SERPL-SCNC: 4.4 MMOL/L (ref 3.5–5.3)
PROT SERPL-MCNC: 7 G/DL (ref 6.4–8.2)
SODIUM SERPL-SCNC: 141 MMOL/L (ref 136–145)

## 2023-11-11 PROCEDURE — 3078F DIAST BP <80 MM HG: CPT | Performed by: INTERNAL MEDICINE

## 2023-11-11 PROCEDURE — 1036F TOBACCO NON-USER: CPT | Performed by: INTERNAL MEDICINE

## 2023-11-11 PROCEDURE — 36415 COLL VENOUS BLD VENIPUNCTURE: CPT

## 2023-11-11 PROCEDURE — 99213 OFFICE O/P EST LOW 20 MIN: CPT | Performed by: INTERNAL MEDICINE

## 2023-11-11 PROCEDURE — 80053 COMPREHEN METABOLIC PANEL: CPT

## 2023-11-11 PROCEDURE — 3074F SYST BP LT 130 MM HG: CPT | Performed by: INTERNAL MEDICINE

## 2023-11-11 PROCEDURE — 1159F MED LIST DOCD IN RCRD: CPT | Performed by: INTERNAL MEDICINE

## 2023-11-11 PROCEDURE — 1126F AMNT PAIN NOTED NONE PRSNT: CPT | Performed by: INTERNAL MEDICINE

## 2023-11-11 ASSESSMENT — ENCOUNTER SYMPTOMS
OCCASIONAL FEELINGS OF UNSTEADINESS: 0
DEPRESSION: 0
LOSS OF SENSATION IN FEET: 0

## 2023-11-11 NOTE — PROGRESS NOTES
Subjective   Patient ID: Charles Chan is a 78 y.o. male who presents for Follow-up.    Mr. Chan today came here for follow-up appointment.  His BUN and creatinine is not drawn.  I will draw it today.  He brought a very meticulous record of his morning and evening weight.  Average weight gain is about three pounds morning till evening.  Otherwise he is okay.  No chest pain or shortness of breath.  He did not notice leg swelling.    I have personally reviewed the patient's Past Medical History, Medications, Allergies, Social History, and Family History in the EMR.    Review of Systems   All other systems reviewed and are negative.    Objective   /76   Ht 1.829 m (6')   Wt 109 kg (241 lb)   BMI 32.69 kg/m²     Physical Exam  Vitals reviewed.   Cardiovascular:      Heart sounds: Normal heart sounds, S1 normal and S2 normal. No murmur heard.     No friction rub.   Pulmonary:      Effort: Pulmonary effort is normal.      Breath sounds: Normal breath sounds and air entry.   Abdominal:      Palpations: There is no hepatomegaly, splenomegaly or mass.   Musculoskeletal:      Right lower le+ Edema present.      Left lower le+ Edema present.   Lymphadenopathy:      Lower Body: No right inguinal adenopathy. No left inguinal adenopathy.   Neurological:      Cranial Nerves: Cranial nerves 2-12 are intact.      Sensory: No sensory deficit.      Motor: Motor function is intact.      Deep Tendon Reflexes: Reflexes are normal and symmetric.     LAB WORK: Laboratory testing discussed.    Assessment/Plan   Problem List Items Addressed This Visit             ICD-10-CM       Cardiac and Vasculature    Hypercholesterolemia E78.00    Relevant Orders    Comprehensive metabolic panel       Genitourinary and Reproductive    Chronic renal failure, stage 2 (mild) - Primary N18.2     Other Visit Diagnoses         Codes    Essential (primary) hypertension     I10    Pre-diabetes     R73.03        1. Chronic renal failure,  stage 2 to 3, stable.  I will monitor.  2. Hypertension, stable.  3. Cholesterol.  Monitor.  4. Pre-diabetic, stable.  Doing okay.  5. Today’s electrolyte check I will communicate over the phone.  If everything is okay and stable,  probably about two to three week’s time, I will keep a very close eye.    Scribe Attestation  By signing my name below, I, Leonie Ortiz, Scribe attest that this documentation has been prepared under the direction and in the presence of Moi Pryor MD.

## 2023-12-08 ENCOUNTER — OFFICE VISIT (OUTPATIENT)
Dept: CARDIOLOGY | Facility: CLINIC | Age: 78
End: 2023-12-08
Payer: MEDICARE

## 2023-12-08 VITALS
DIASTOLIC BLOOD PRESSURE: 71 MMHG | WEIGHT: 244.1 LBS | BODY MASS INDEX: 33.06 KG/M2 | HEART RATE: 50 BPM | SYSTOLIC BLOOD PRESSURE: 154 MMHG | HEIGHT: 72 IN | OXYGEN SATURATION: 98 %

## 2023-12-08 DIAGNOSIS — I10 PRIMARY HYPERTENSION: ICD-10-CM

## 2023-12-08 DIAGNOSIS — R00.1 BRADYCARDIA: ICD-10-CM

## 2023-12-08 PROCEDURE — 1159F MED LIST DOCD IN RCRD: CPT | Performed by: NURSE PRACTITIONER

## 2023-12-08 PROCEDURE — 3078F DIAST BP <80 MM HG: CPT | Performed by: NURSE PRACTITIONER

## 2023-12-08 PROCEDURE — 1036F TOBACCO NON-USER: CPT | Performed by: NURSE PRACTITIONER

## 2023-12-08 PROCEDURE — 99214 OFFICE O/P EST MOD 30 MIN: CPT | Mod: PO | Performed by: NURSE PRACTITIONER

## 2023-12-08 PROCEDURE — 1160F RVW MEDS BY RX/DR IN RCRD: CPT | Performed by: NURSE PRACTITIONER

## 2023-12-08 PROCEDURE — 1126F AMNT PAIN NOTED NONE PRSNT: CPT | Performed by: NURSE PRACTITIONER

## 2023-12-08 PROCEDURE — 99214 OFFICE O/P EST MOD 30 MIN: CPT | Performed by: NURSE PRACTITIONER

## 2023-12-08 PROCEDURE — 3077F SYST BP >= 140 MM HG: CPT | Performed by: NURSE PRACTITIONER

## 2023-12-08 RX ORDER — METOPROLOL SUCCINATE 25 MG/1
25 TABLET, EXTENDED RELEASE ORAL DAILY
Qty: 90 TABLET | Refills: 1 | Status: SHIPPED | OUTPATIENT
Start: 2023-12-08

## 2023-12-08 ASSESSMENT — ENCOUNTER SYMPTOMS
CARDIOVASCULAR NEGATIVE: 1
CONSTITUTIONAL NEGATIVE: 1
RESPIRATORY NEGATIVE: 1
GASTROINTESTINAL NEGATIVE: 1
MUSCULOSKELETAL NEGATIVE: 1
NEUROLOGICAL NEGATIVE: 1

## 2023-12-08 ASSESSMENT — PATIENT HEALTH QUESTIONNAIRE - PHQ9
2. FEELING DOWN, DEPRESSED OR HOPELESS: NOT AT ALL
1. LITTLE INTEREST OR PLEASURE IN DOING THINGS: NOT AT ALL
SUM OF ALL RESPONSES TO PHQ9 QUESTIONS 1 AND 2: 0

## 2023-12-08 ASSESSMENT — PAIN SCALES - GENERAL: PAINLEVEL: 0-NO PAIN

## 2023-12-08 NOTE — PROGRESS NOTES
Chief Complaint:   Follow up    History Of Present Illness:    .Mr Chan is feeling less lightheaded with his med adjustments.  Denies chest pain, sob, palpitations or pedal edema.           Last Recorded Vitals:  Blood pressure 154/71, pulse 50, height 1.829 m (6'), weight 111 kg (244 lb 1.6 oz), SpO2 98 %.     Past Medical History:  Past Medical History:   Diagnosis Date    BPH (benign prostatic hyperplasia)     Chronic renal disease, stage 2, mildly decreased glomerular filtration rate (GFR) between 60-89 mL/min/1.73 square meter     High cholesterol     Hypertension     Hypothyroid     Overweight     Personal history of other diseases of the circulatory system     History of hypertension    Personal history of other diseases of the digestive system 10/27/2021    History of gastroesophageal reflux (GERD)    Prediabetes         Past Surgical History:  Past Surgical History:   Procedure Laterality Date    APPENDECTOMY  06/13/2013    Appendectomy    COLON SURGERY  06/13/2013    Colon Surgery    GALLBLADDER SURGERY  06/13/2013    Gallbladder Surgery       Social History:  Social History     Socioeconomic History    Marital status:      Spouse name: None    Number of children: None    Years of education: None    Highest education level: None   Occupational History    Occupation: Retired   Tobacco Use    Smoking status: Never    Smokeless tobacco: Never   Substance and Sexual Activity    Alcohol use: Never    Drug use: Never    Sexual activity: None   Other Topics Concern    None   Social History Narrative    None     Social Determinants of Health     Financial Resource Strain: Not on file   Food Insecurity: Not on file   Transportation Needs: Not on file   Physical Activity: Not on file   Stress: Not on file   Social Connections: Not on file   Intimate Partner Violence: Not on file   Housing Stability: Not on file       Family History:  Family History   Problem Relation Name Age of Onset    Hypertension  Mother      Heart disease Mother      Stroke Father           Allergies:  Patient has no known allergies.    Outpatient Medications:  Current Outpatient Medications   Medication Sig Dispense Refill    alfuzosin (Uroxatral) 10 mg 24 hr tablet TAKE 1 TABLET BY MOUTH EVERY DAY 90 tablet 1    amLODIPine (Norvasc) 10 mg tablet TAKE 1 TABLET BY MOUTH EVERY DAY 90 tablet 0    ammonium lactate (Lac-Hydrin) 12 % lotion Apply and rub in a thin film to affected areas twice daily (am and pm)      aspirin 325 mg tablet Take 1 tablet (325 mg) by mouth once daily.      cholecalciferol (Vitamin D-3) 5,000 Units tablet TAKE 1 TABLET BY MOUTH EVERY DAY 90 tablet 2    clopidogrel (Plavix) 75 mg tablet TAKE 1 TABLET BY MOUTH EVERY DAY 90 tablet 0    cyanocobalamin, vitamin B-12, (Vitamin B-12) 1,000 mcg tablet extended release Take 1 tablet (1,000 mcg) by mouth once daily. As directed      econazole nitrate 1 % cream Apply topically once daily. 300 g 0    famotidine (Pepcid) 20 mg tablet TAKE 1 TABLET BY MOUTH TWICE A  tablet 0    finasteride (Proscar) 5 mg tablet TAKE 1 TABLET BY MOUTH EVERY DAY 90 tablet 0    fluticasone (Flonase) 50 mcg/actuation nasal spray USE 1 SPRAY EVERY DAY IN EACH NOSTRIL *LASTS 60 DAYS* 48 mL 0    furosemide (Lasix) 40 mg tablet TAKE 1 TABLET BY MOUTH EVERY DAY 90 tablet 0    metoprolol succinate XL (Toprol-XL) 50 mg 24 hr tablet TAKE 1 TABLET (50 MG) BY MOUTH EVERY DAY DO NOT CRUSH OR CHEW 90 tablet 4    pantoprazole (ProtoNix) 40 mg EC tablet Take 1 tablet (40 mg) by mouth once daily in the morning. Take before meals. Do not crush, chew, or split. 90 tablet 0    potassium chloride CR 20 mEq ER tablet TAKE 1 TABLET BY MOUTH EVERY DAY 90 tablet 0    simvastatin (Zocor) 40 mg tablet TAKE 1 TABLET BY MOUTH EVERY DAY 90 tablet 0     No current facility-administered medications for this visit.        Physical Exam:  Cardiovascular:      PMI at left midclavicular line. Normal rate. Regular rhythm. Normal  S1. Normal S2.       Murmurs: There is no murmur.      No gallop.  No click. No rub.   Pulses:     Intact distal pulses.   Edema:     Peripheral edema absent.         ROS:  Review of Systems   Constitutional: Negative.   Cardiovascular: Negative.    Respiratory: Negative.     Skin: Negative.    Musculoskeletal: Negative.    Gastrointestinal: Negative.    Genitourinary: Negative.    Neurological: Negative.           Last Labs:  CBC -  Lab Results   Component Value Date    WBC 7.8 08/14/2023    HGB 12.7 (L) 08/14/2023    HCT 39.3 (L) 08/14/2023    MCV 90 08/14/2023     08/14/2023       CMP -  Lab Results   Component Value Date    CALCIUM 9.1 11/11/2023    PROT 7.0 11/11/2023    ALBUMIN 4.0 11/11/2023    AST 15 11/11/2023    ALT 14 11/11/2023    ALKPHOS 91 11/11/2023    BILITOT 0.5 11/11/2023       LIPID PANEL -   Lab Results   Component Value Date    CHOL 122 09/11/2023    TRIG 69 09/11/2023    HDL 43.6 09/11/2023    CHHDL 2.8 09/11/2023    LDLF 65 09/11/2023    VLDL 14 09/11/2023       RENAL FUNCTION PANEL -   Lab Results   Component Value Date    GLUCOSE 103 (H) 11/11/2023     11/11/2023    K 4.4 11/11/2023     11/11/2023    CO2 25 11/11/2023    ANIONGAP 14 11/11/2023    BUN 33 (H) 11/11/2023    CREATININE 2.05 (H) 11/11/2023    GFRMALE 36 (A) 09/11/2023    CALCIUM 9.1 11/11/2023    ALBUMIN 4.0 11/11/2023        Lab Results   Component Value Date    HGBA1C 5.2 09/11/2023         Assessment/Plan   Problem List Items Addressed This Visit    None    Assessment/Plan   he primary encounter diagnosis was Shortness of breath. Diagnoses of Bradycardia, Atherosclerotic heart disease of native coronary artery without angina pectoris, and Primary hypertension were also pertinent to this visit.  1. CAD with unstable angina May 2009.  2. CAD with PCI and drug eluting stent to the mid and distal LCX and   proximal RCA May 18, 2009. Please refer to prior office notes for   review.  3. Repeat cardiac  catheterization February 8, 2010 with stents widely patent.  The patient is complaining of increasing shortness of breath particularly when climbing stairs as well as lightheadedness when standing up quickly.  As discussed below his heart rate is slow and blood pressure low related to medication.  His dose of metoprolol will be reduced and hydralazine discontinued.  He will return in 8 weeks for follow-up visit.  4. Hypertension.  The patient's blood pressure is well within normal range and he is relatively bradycardic.  EKG today 10/6/2023 demonstrates a sinus bradycardia at 38/min first-degree AV block WA interval 226 ms.  The patient's metoprolol tartrate will be reduced from 50 mg twice daily to 25 mg twice daily and subsequently changed to Toprol-XL 50 mg daily.  Patient will stop hydralazine 25 mg 3 times daily.  Return to office in 8 weeks.      5. Renal insufficiency. Recent creatinine 1.88 when checked on 9/11/2023      6. Remote former history of tobacco use with cessation since 1991.  7. Hyperlipidemia. Recent lipid panel showed a total cholesterol 122 LDL 65 HDL 43 triglycerides 69.  Labs were performed 9/11/2023.  8. Questionable left adrenal adenoma.  9. Mild DJD of the right shoulder. Patient will continue to follow up with Dr. Dick   10.BPH.  11. YENNIFER Kim, KATEY-CNP

## 2024-01-15 DIAGNOSIS — R73.03 PRE-DIABETES: ICD-10-CM

## 2024-01-15 DIAGNOSIS — I10 HYPERTENSION, UNSPECIFIED TYPE: ICD-10-CM

## 2024-01-15 DIAGNOSIS — E78.00 HYPERCHOLESTEROLEMIA: ICD-10-CM

## 2024-01-16 ENCOUNTER — LAB (OUTPATIENT)
Dept: LAB | Facility: LAB | Age: 79
End: 2024-01-16
Payer: MEDICARE

## 2024-01-16 DIAGNOSIS — I10 HYPERTENSION, UNSPECIFIED TYPE: ICD-10-CM

## 2024-01-16 DIAGNOSIS — R73.03 PRE-DIABETES: ICD-10-CM

## 2024-01-16 DIAGNOSIS — E78.00 HYPERCHOLESTEROLEMIA: ICD-10-CM

## 2024-01-16 LAB
ALBUMIN SERPL BCP-MCNC: 3.8 G/DL (ref 3.4–5)
ALP SERPL-CCNC: 80 U/L (ref 33–136)
ALT SERPL W P-5'-P-CCNC: 13 U/L (ref 10–52)
ANION GAP SERPL CALC-SCNC: 9 MMOL/L (ref 10–20)
APPEARANCE UR: CLEAR
AST SERPL W P-5'-P-CCNC: 17 U/L (ref 9–39)
BILIRUB SERPL-MCNC: 0.6 MG/DL (ref 0–1.2)
BILIRUB UR STRIP.AUTO-MCNC: NEGATIVE MG/DL
BUN SERPL-MCNC: 25 MG/DL (ref 6–23)
CALCIUM SERPL-MCNC: 9 MG/DL (ref 8.6–10.3)
CHLORIDE SERPL-SCNC: 109 MMOL/L (ref 98–107)
CHOLEST SERPL-MCNC: 144 MG/DL (ref 0–199)
CHOLESTEROL/HDL RATIO: 3.1
CO2 SERPL-SCNC: 26 MMOL/L (ref 21–32)
COLOR UR: YELLOW
CREAT SERPL-MCNC: 1.81 MG/DL (ref 0.5–1.3)
EGFRCR SERPLBLD CKD-EPI 2021: 37 ML/MIN/1.73M*2
ERYTHROCYTE [DISTWIDTH] IN BLOOD BY AUTOMATED COUNT: 12.1 % (ref 11.5–14.5)
GLUCOSE SERPL-MCNC: 96 MG/DL (ref 74–99)
GLUCOSE UR STRIP.AUTO-MCNC: NEGATIVE MG/DL
HCT VFR BLD AUTO: 41.7 % (ref 41–52)
HDLC SERPL-MCNC: 46.1 MG/DL
HGB BLD-MCNC: 13.6 G/DL (ref 13.5–17.5)
HYALINE CASTS #/AREA URNS AUTO: ABNORMAL /LPF
KETONES UR STRIP.AUTO-MCNC: NEGATIVE MG/DL
LDLC SERPL CALC-MCNC: 78 MG/DL
LEUKOCYTE ESTERASE UR QL STRIP.AUTO: NEGATIVE
MCH RBC QN AUTO: 29.3 PG (ref 26–34)
MCHC RBC AUTO-ENTMCNC: 32.6 G/DL (ref 32–36)
MCV RBC AUTO: 90 FL (ref 80–100)
NITRITE UR QL STRIP.AUTO: NEGATIVE
NON HDL CHOLESTEROL: 98 MG/DL (ref 0–149)
NRBC BLD-RTO: 0 /100 WBCS (ref 0–0)
PH UR STRIP.AUTO: 5 [PH]
PLATELET # BLD AUTO: 217 X10*3/UL (ref 150–450)
POTASSIUM SERPL-SCNC: 4 MMOL/L (ref 3.5–5.3)
PROT SERPL-MCNC: 6.6 G/DL (ref 6.4–8.2)
PROT UR STRIP.AUTO-MCNC: ABNORMAL MG/DL
RBC # BLD AUTO: 4.64 X10*6/UL (ref 4.5–5.9)
RBC # UR STRIP.AUTO: NEGATIVE /UL
RBC #/AREA URNS AUTO: ABNORMAL /HPF
SODIUM SERPL-SCNC: 140 MMOL/L (ref 136–145)
SP GR UR STRIP.AUTO: 1.02
SQUAMOUS #/AREA URNS AUTO: ABNORMAL /HPF
TRIGL SERPL-MCNC: 100 MG/DL (ref 0–149)
TSH SERPL-ACNC: 2.46 MIU/L (ref 0.44–3.98)
UROBILINOGEN UR STRIP.AUTO-MCNC: <2 MG/DL
VLDL: 20 MG/DL (ref 0–40)
WBC # BLD AUTO: 7.1 X10*3/UL (ref 4.4–11.3)
WBC #/AREA URNS AUTO: ABNORMAL /HPF

## 2024-01-16 PROCEDURE — 83036 HEMOGLOBIN GLYCOSYLATED A1C: CPT

## 2024-01-16 PROCEDURE — 85027 COMPLETE CBC AUTOMATED: CPT

## 2024-01-16 PROCEDURE — 80053 COMPREHEN METABOLIC PANEL: CPT

## 2024-01-16 PROCEDURE — 84443 ASSAY THYROID STIM HORMONE: CPT

## 2024-01-16 PROCEDURE — 80061 LIPID PANEL: CPT

## 2024-01-16 PROCEDURE — 36415 COLL VENOUS BLD VENIPUNCTURE: CPT

## 2024-01-16 PROCEDURE — 81001 URINALYSIS AUTO W/SCOPE: CPT

## 2024-01-17 LAB
EST. AVERAGE GLUCOSE BLD GHB EST-MCNC: 108 MG/DL
HBA1C MFR BLD: 5.4 %

## 2024-01-20 ENCOUNTER — OFFICE VISIT (OUTPATIENT)
Dept: PRIMARY CARE | Facility: CLINIC | Age: 79
End: 2024-01-20
Payer: MEDICARE

## 2024-01-20 VITALS
WEIGHT: 245.2 LBS | HEIGHT: 72 IN | BODY MASS INDEX: 33.21 KG/M2 | SYSTOLIC BLOOD PRESSURE: 126 MMHG | DIASTOLIC BLOOD PRESSURE: 72 MMHG

## 2024-01-20 DIAGNOSIS — I10 HYPERTENSION, UNSPECIFIED TYPE: Primary | ICD-10-CM

## 2024-01-20 DIAGNOSIS — E78.00 HYPERCHOLESTEROLEMIA: ICD-10-CM

## 2024-01-20 DIAGNOSIS — Z13.29 THYROID DISORDER SCREENING: ICD-10-CM

## 2024-01-20 DIAGNOSIS — N18.2 CHRONIC RENAL FAILURE, STAGE 2 (MILD): ICD-10-CM

## 2024-01-20 DIAGNOSIS — E66.3 OVERWEIGHT: ICD-10-CM

## 2024-01-20 DIAGNOSIS — R73.03 PRE-DIABETES: ICD-10-CM

## 2024-01-20 PROCEDURE — 3074F SYST BP LT 130 MM HG: CPT | Performed by: INTERNAL MEDICINE

## 2024-01-20 PROCEDURE — 99213 OFFICE O/P EST LOW 20 MIN: CPT | Performed by: INTERNAL MEDICINE

## 2024-01-20 PROCEDURE — 1160F RVW MEDS BY RX/DR IN RCRD: CPT | Performed by: INTERNAL MEDICINE

## 2024-01-20 PROCEDURE — 1159F MED LIST DOCD IN RCRD: CPT | Performed by: INTERNAL MEDICINE

## 2024-01-20 PROCEDURE — 1126F AMNT PAIN NOTED NONE PRSNT: CPT | Performed by: INTERNAL MEDICINE

## 2024-01-20 PROCEDURE — 3078F DIAST BP <80 MM HG: CPT | Performed by: INTERNAL MEDICINE

## 2024-01-20 PROCEDURE — 1036F TOBACCO NON-USER: CPT | Performed by: INTERNAL MEDICINE

## 2024-01-20 ASSESSMENT — ENCOUNTER SYMPTOMS
OCCASIONAL FEELINGS OF UNSTEADINESS: 0
LOSS OF SENSATION IN FEET: 0
DEPRESSION: 0

## 2024-01-20 NOTE — PROGRESS NOTES
Subjective   Patient ID: Charles Chan is a 78 y.o. male who presents for Follow-up (on various conditions).    Mr. Chan today came here for follow-up on various conditions.  He is enjoying his snf, but unfortunately he is not doing enough exercise.  Otherwise, he is okay.  He is trying to lose weight.  Appetite and weight are okay.  No problem.    I have personally reviewed the patient's Past Medical History, Medications, Allergies, Social History, and Family History in the EMR.    Review of Systems   All other systems reviewed and are negative.    Objective   /72   Ht 1.829 m (6')   Wt 111 kg (245 lb 3.2 oz)   BMI 33.26 kg/m²     Physical Exam  Vitals reviewed.   Cardiovascular:      Heart sounds: Normal heart sounds, S1 normal and S2 normal. No murmur heard.     No friction rub.   Pulmonary:      Effort: Pulmonary effort is normal.      Breath sounds: Normal breath sounds and air entry.   Abdominal:      Palpations: There is no hepatomegaly, splenomegaly or mass.   Musculoskeletal:      Right lower leg: No edema.      Left lower leg: No edema.   Lymphadenopathy:      Lower Body: No right inguinal adenopathy. No left inguinal adenopathy.   Neurological:      Cranial Nerves: Cranial nerves 2-12 are intact.      Sensory: No sensory deficit.      Motor: Motor function is intact.      Deep Tendon Reflexes: Reflexes are normal and symmetric.     LAB WORK:  Laboratory testing discussed.    Assessment/Plan   Problem List Items Addressed This Visit             ICD-10-CM       Cardiac and Vasculature    Hypertension - Primary I10    Hypercholesterolemia E78.00    Relevant Orders    Comprehensive Metabolic Panel       Genitourinary and Reproductive    Chronic renal failure, stage 2 (mild) N18.2     Other Visit Diagnoses         Codes    Pre-diabetes     R73.03    Thyroid disorder screening     Z13.29    Overweight     E66.3        1. Hypertension.  Numbers are looking better.  2. Chronic renal failure,  state 2 to 3.  Good news is that after stopping ACE and ARB, it reversed. Serum  creatinine down to 1.8.  Very desirable, doing good.  3. Cholesterol, stable.  4. Pre-diabetic.  Sugar is okay.  Hemoglobin A1c is okay.  5. Thyroid, okay.  6. Overweight.  Diet and exercise.  7. I have advised him restrained on eating ______ lose some weight, to cut down the salt. Healthy eating habit.  8. Follow-up appointment with me in the beginning of March with repeat blood work.  9. I thanked him for taking instruction and following them.    Scribe Attestation  By signing my name below, I, Gin Salazar attest that this documentation has been prepared under the direction and in the presence of Moi Pryor MD.

## 2024-03-08 ENCOUNTER — APPOINTMENT (OUTPATIENT)
Dept: PRIMARY CARE | Facility: CLINIC | Age: 79
End: 2024-03-08
Payer: MEDICARE

## 2024-03-16 ENCOUNTER — OFFICE VISIT (OUTPATIENT)
Dept: PRIMARY CARE | Facility: CLINIC | Age: 79
End: 2024-03-16
Payer: MEDICARE

## 2024-03-16 VITALS
BODY MASS INDEX: 33.59 KG/M2 | WEIGHT: 248 LBS | DIASTOLIC BLOOD PRESSURE: 76 MMHG | HEIGHT: 72 IN | SYSTOLIC BLOOD PRESSURE: 142 MMHG

## 2024-03-16 DIAGNOSIS — N18.9 CHRONIC RENAL IMPAIRMENT, UNSPECIFIED CKD STAGE: ICD-10-CM

## 2024-03-16 DIAGNOSIS — R05.9 COUGH, UNSPECIFIED TYPE: ICD-10-CM

## 2024-03-16 DIAGNOSIS — J32.9 OTHER SINUSITIS, UNSPECIFIED CHRONICITY: ICD-10-CM

## 2024-03-16 DIAGNOSIS — J45.909 ACUTE ASTHMATIC BRONCHITIS (HHS-HCC): Primary | ICD-10-CM

## 2024-03-16 PROCEDURE — 99214 OFFICE O/P EST MOD 30 MIN: CPT | Performed by: INTERNAL MEDICINE

## 2024-03-16 PROCEDURE — 1159F MED LIST DOCD IN RCRD: CPT | Performed by: INTERNAL MEDICINE

## 2024-03-16 PROCEDURE — 3078F DIAST BP <80 MM HG: CPT | Performed by: INTERNAL MEDICINE

## 2024-03-16 PROCEDURE — 1036F TOBACCO NON-USER: CPT | Performed by: INTERNAL MEDICINE

## 2024-03-16 PROCEDURE — 3077F SYST BP >= 140 MM HG: CPT | Performed by: INTERNAL MEDICINE

## 2024-03-16 RX ORDER — AMOXICILLIN AND CLAVULANATE POTASSIUM 875; 125 MG/1; MG/1
875 TABLET, FILM COATED ORAL 2 TIMES DAILY
Qty: 20 TABLET | Refills: 0 | Status: SHIPPED | OUTPATIENT
Start: 2024-03-16 | End: 2024-03-26

## 2024-03-16 RX ORDER — BUDESONIDE, GLYCOPYRROLATE, AND FORMOTEROL FUMARATE 160; 9; 4.8 UG/1; UG/1; UG/1
2 AEROSOL, METERED RESPIRATORY (INHALATION) 2 TIMES DAILY
Qty: 24 G | Refills: 0 | Status: SHIPPED | OUTPATIENT
Start: 2024-03-16 | End: 2024-03-16

## 2024-03-16 RX ORDER — BENZONATATE 100 MG/1
100 CAPSULE ORAL 3 TIMES DAILY PRN
Qty: 42 CAPSULE | Refills: 0 | Status: SHIPPED | OUTPATIENT
Start: 2024-03-16 | End: 2024-04-22 | Stop reason: HOSPADM

## 2024-03-16 RX ORDER — DOXYCYCLINE 100 MG/1
100 CAPSULE ORAL 2 TIMES DAILY
Qty: 20 CAPSULE | Refills: 0 | Status: SHIPPED | OUTPATIENT
Start: 2024-03-16 | End: 2024-03-26

## 2024-03-16 ASSESSMENT — ENCOUNTER SYMPTOMS
LOSS OF SENSATION IN FEET: 0
OCCASIONAL FEELINGS OF UNSTEADINESS: 0
DEPRESSION: 0

## 2024-03-16 NOTE — PROGRESS NOTES
Subjective   Patient ID: Charles Chan is a 78 y.o. male who presents for URI symptoms.    Donell today came here for cough, yellow sputum, sinus congestion, bronchitis going on for the last several days.  Over-the-counter medications not helping.  Appetite and weight are okay.  No problem.     I have personally reviewed the patient's Past Medical History, Medications, Allergies, Social History, and Family History in the EMR.    Review of Systems   All other systems reviewed and are negative.    Objective   /76   Ht 1.829 m (6')   Wt 112 kg (248 lb)   BMI 33.63 kg/m²     Physical Exam  Vitals reviewed.   HENT:      Right Ear: Tympanic membrane is retracted.      Left Ear: Tympanic membrane is retracted.      Nose: Congestion present.      Comments: Postnasal drip.     Mouth/Throat:      Comments: Throat congested.  Cardiovascular:      Heart sounds: Normal heart sounds, S1 normal and S2 normal. No murmur heard.     No friction rub.   Pulmonary:      Effort: Pulmonary effort is normal.      Breath sounds: Wheezing (Bilateral) present.   Abdominal:      Palpations: There is no hepatomegaly, splenomegaly or mass.   Musculoskeletal:      Right lower leg: No edema.      Left lower leg: No edema.   Lymphadenopathy:      Lower Body: No right inguinal adenopathy. No left inguinal adenopathy.   Neurological:      Cranial Nerves: Cranial nerves 2-12 are intact.      Sensory: No sensory deficit.      Motor: Motor function is intact.      Deep Tendon Reflexes: Reflexes are normal and symmetric.     LAB WORK:  Laboratory testing discussed.    Assessment/Plan   Problem List Items Addressed This Visit             ICD-10-CM       ENT    Sinusitis J32.9    Relevant Medications    amoxicillin-pot clavulanate (Augmentin) 875-125 mg tablet    doxycycline (Vibramycin) 100 mg capsule    benzonatate (Tessalon) 100 mg capsule     Other Visit Diagnoses         Codes    Acute asthmatic bronchitis    -  Primary J45.909    Cough,  unspecified type     R05.9    Chronic renal impairment, unspecified CKD stage     N18.9        1. Acute asthmatic bronchitis and cough.  Advised saline gargles and steam inhalation.  Augmentin, doxycycline, Claritin, Tessalon Perles, and inhaler.  2.  Follow up in two weeks if not better.  3. Chronic renal insufficiency.  Monitor kidney.  Stay hydrated.  4. Continue to follow.    Scribe Attestation  By signing my name below, Leonie CALERO Scribe attest that this documentation has been prepared under the direction and in the presence of Moi Pryor MD.

## 2024-04-01 DIAGNOSIS — Z87.438 PERSONAL HISTORY OF OTHER DISEASES OF MALE GENITAL ORGANS: ICD-10-CM

## 2024-04-01 RX ORDER — ALFUZOSIN HYDROCHLORIDE 10 MG/1
TABLET, EXTENDED RELEASE ORAL
Qty: 90 TABLET | Refills: 1 | Status: SHIPPED | OUTPATIENT
Start: 2024-04-01

## 2024-04-05 ENCOUNTER — APPOINTMENT (OUTPATIENT)
Dept: PRIMARY CARE | Facility: CLINIC | Age: 79
End: 2024-04-05
Payer: MEDICARE

## 2024-04-12 ENCOUNTER — OFFICE VISIT (OUTPATIENT)
Dept: CARDIOLOGY | Facility: CLINIC | Age: 79
End: 2024-04-12
Payer: MEDICARE

## 2024-04-12 VITALS
HEART RATE: 68 BPM | SYSTOLIC BLOOD PRESSURE: 163 MMHG | OXYGEN SATURATION: 97 % | HEIGHT: 72 IN | DIASTOLIC BLOOD PRESSURE: 78 MMHG | WEIGHT: 250 LBS | BODY MASS INDEX: 33.86 KG/M2

## 2024-04-12 DIAGNOSIS — I65.23 OCCLUSION AND STENOSIS OF BILATERAL CAROTID ARTERIES: ICD-10-CM

## 2024-04-12 DIAGNOSIS — I25.10 CORONARY ARTERY DISEASE INVOLVING NATIVE CORONARY ARTERY OF NATIVE HEART WITHOUT ANGINA PECTORIS: Primary | ICD-10-CM

## 2024-04-12 PROCEDURE — 99214 OFFICE O/P EST MOD 30 MIN: CPT | Performed by: NURSE PRACTITIONER

## 2024-04-12 PROCEDURE — 1160F RVW MEDS BY RX/DR IN RCRD: CPT | Performed by: NURSE PRACTITIONER

## 2024-04-12 PROCEDURE — 1159F MED LIST DOCD IN RCRD: CPT | Performed by: NURSE PRACTITIONER

## 2024-04-12 PROCEDURE — 3078F DIAST BP <80 MM HG: CPT | Performed by: NURSE PRACTITIONER

## 2024-04-12 PROCEDURE — 3077F SYST BP >= 140 MM HG: CPT | Performed by: NURSE PRACTITIONER

## 2024-04-12 PROCEDURE — 1036F TOBACCO NON-USER: CPT | Performed by: NURSE PRACTITIONER

## 2024-04-12 RX ORDER — ROSUVASTATIN CALCIUM 40 MG/1
40 TABLET, COATED ORAL DAILY
Qty: 90 TABLET | Refills: 1 | Status: SHIPPED | OUTPATIENT
Start: 2024-04-12 | End: 2025-04-12

## 2024-04-12 ASSESSMENT — ENCOUNTER SYMPTOMS
OCCASIONAL FEELINGS OF UNSTEADINESS: 0
LOSS OF SENSATION IN FEET: 0
DEPRESSION: 0

## 2024-04-12 NOTE — PROGRESS NOTES
Chief Complaint:   Follow-up (4 month) and Bradycardia    History Of Present Illness:    .HPI     Last Recorded Vitals:  Blood pressure 163/78, pulse 68, height 1.829 m (6'), weight 113 kg (250 lb), SpO2 97%.     Past Medical History:  Past Medical History:   Diagnosis Date    BPH (benign prostatic hyperplasia)     Chronic renal disease, stage 2, mildly decreased glomerular filtration rate (GFR) between 60-89 mL/min/1.73 square meter     High cholesterol     Hypertension     Hypothyroid     Overweight     Personal history of other diseases of the circulatory system     History of hypertension    Personal history of other diseases of the digestive system 10/27/2021    History of gastroesophageal reflux (GERD)    Prediabetes         Past Surgical History:  Past Surgical History:   Procedure Laterality Date    APPENDECTOMY  06/13/2013    Appendectomy    COLON SURGERY  06/13/2013    Colon Surgery    GALLBLADDER SURGERY  06/13/2013    Gallbladder Surgery       Social History:  Social History     Socioeconomic History    Marital status:      Spouse name: None    Number of children: None    Years of education: None    Highest education level: None   Occupational History    Occupation: Retired   Tobacco Use    Smoking status: Never    Smokeless tobacco: Never   Substance and Sexual Activity    Alcohol use: Never    Drug use: Never    Sexual activity: None   Other Topics Concern    None   Social History Narrative    None     Social Determinants of Health     Financial Resource Strain: Not on file   Food Insecurity: Not on file   Transportation Needs: Not on file   Physical Activity: Not on file   Stress: Not on file   Social Connections: Not on file   Intimate Partner Violence: Not on file   Housing Stability: Not on file       Family History:  Family History   Problem Relation Name Age of Onset    Hypertension Mother      Heart disease Mother      Stroke Father           Allergies:  Patient has no known  allergies.    Outpatient Medications:  Current Outpatient Medications   Medication Sig Dispense Refill    alfuzosin (Uroxatral) 10 mg 24 hr tablet TAKE 1 TABLET BY MOUTH EVERY DAY 90 tablet 1    amLODIPine (Norvasc) 10 mg tablet TAKE 1 TABLET BY MOUTH EVERY DAY 90 tablet 0    ammonium lactate (Lac-Hydrin) 12 % lotion Apply and rub in a thin film to affected areas twice daily (am and pm)      aspirin 325 mg tablet Take 1 tablet (325 mg) by mouth once daily.      benzonatate (Tessalon) 100 mg capsule Take 1 capsule (100 mg) by mouth 3 times a day as needed for cough. Do not crush or chew. 42 capsule 0    cholecalciferol (Vitamin D-3) 5,000 Units tablet TAKE 1 TABLET BY MOUTH EVERY DAY 90 tablet 2    clopidogrel (Plavix) 75 mg tablet TAKE 1 TABLET BY MOUTH EVERY DAY 90 tablet 0    cyanocobalamin, vitamin B-12, (Vitamin B-12) 1,000 mcg tablet extended release Take 1 tablet (1,000 mcg) by mouth once daily. As directed      famotidine (Pepcid) 20 mg tablet TAKE 1 TABLET BY MOUTH TWICE A  tablet 0    finasteride (Proscar) 5 mg tablet TAKE 1 TABLET BY MOUTH EVERY DAY 90 tablet 0    fluticasone (Flonase) 50 mcg/actuation nasal spray USE 1 SPRAY EVERY DAY IN EACH NOSTRIL *LASTS 60 DAYS* 48 mL 0    furosemide (Lasix) 40 mg tablet TAKE 1 TABLET BY MOUTH EVERY DAY 90 tablet 0    metoprolol succinate XL (Toprol-XL) 25 mg 24 hr tablet Take 1 tablet (25 mg) by mouth once daily. DO NOT CRUSH OR CHEW 90 tablet 1    pantoprazole (ProtoNix) 40 mg EC tablet TAKE 1 TABLET BY MOUTH ONCE DAILY IN THE MORNING BEFORE A MEAL. DO NOT CRUSH, CHEW OR SPLIT 90 tablet 0    potassium chloride CR 20 mEq ER tablet TAKE 1 TABLET BY MOUTH EVERY DAY 90 tablet 0    salmeterol (Serevent Diskus) 50 mcg/dose diskus inhaler Inhale 1 puff 2 times a day. 1 each 0    simvastatin (Zocor) 40 mg tablet TAKE 1 TABLET BY MOUTH EVERY DAY 90 tablet 0     No current facility-administered medications for this visit.        Physical Exam:  Physical  Exam    ROS:  ROS       Last Labs:  CBC -  Lab Results   Component Value Date    WBC 7.1 01/16/2024    HGB 13.6 01/16/2024    HCT 41.7 01/16/2024    MCV 90 01/16/2024     01/16/2024       CMP -  Lab Results   Component Value Date    CALCIUM 9.0 01/16/2024    PROT 6.6 01/16/2024    ALBUMIN 3.8 01/16/2024    AST 17 01/16/2024    ALT 13 01/16/2024    ALKPHOS 80 01/16/2024    BILITOT 0.6 01/16/2024       LIPID PANEL -   Lab Results   Component Value Date    CHOL 144 01/16/2024    TRIG 100 01/16/2024    HDL 46.1 01/16/2024    CHHDL 3.1 01/16/2024    LDLF 65 09/11/2023    VLDL 20 01/16/2024    NHDL 98 01/16/2024       RENAL FUNCTION PANEL -   Lab Results   Component Value Date    GLUCOSE 96 01/16/2024     01/16/2024    K 4.0 01/16/2024     (H) 01/16/2024    CO2 26 01/16/2024    ANIONGAP 9 (L) 01/16/2024    BUN 25 (H) 01/16/2024    CREATININE 1.81 (H) 01/16/2024    GFRMALE 36 (A) 09/11/2023    CALCIUM 9.0 01/16/2024    ALBUMIN 3.8 01/16/2024        Lab Results   Component Value Date    HGBA1C 5.4 01/16/2024         Assessment/Plan   Problem List Items Addressed This Visit    None    1. CAD with unstable angina May 2009.  2. CAD with PCI and drug eluting stent to the mid and distal LCX and   proximal RCA May 18, 2009. Please refer to prior office notes for   review.  3. Repeat cardiac catheterization February 8, 2010 with stents widely patent.  The patient is complaining of increasing shortness of breath particularly when climbing stairs as well as lightheadedness when standing up quickly.  As discussed below his heart rate is slow and blood pressure low related to medication.  His dose of metoprolol will be reduced and hydralazine discontinued.  He will return in 8 weeks for follow-up visit.  4. Hypertension.  The patient's blood pressure is well within normal range and he is relatively bradycardic.  EKG today 10/6/2023 demonstrates a sinus bradycardia at 38/min first-degree AV block TX interval 226 ms.   The patient's metoprolol tartrate will be reduced from 50 mg twice daily to 25 mg twice daily and subsequently changed to Toprol-XL 50 mg daily.  Patient will stop hydralazine 25 mg 3 times daily.       5. Renal insufficiency. Recent creatinine 1.88 when checked on 9/11/2023      6. Remote former history of tobacco use with cessation since 1991.  7. Hyperlipidemia. Recent lipid panel showed a total cholesterol 122 LDL 65 HDL 43 triglycerides 69.  Labs were performed 9/11/2023.  8. Questionable left adrenal adenoma.  9. Mild DJD of the right shoulder. Patient will continue to follow up with Dr. Dick   10.BPH.  11. GERD   12.  Carotid US.  Bilateral stenosis 50-69%.      Milly Kim, APRN-CNP

## 2024-04-13 ENCOUNTER — OFFICE VISIT (OUTPATIENT)
Dept: PRIMARY CARE | Facility: CLINIC | Age: 79
End: 2024-04-13
Payer: MEDICARE

## 2024-04-13 ENCOUNTER — LAB (OUTPATIENT)
Dept: LAB | Facility: LAB | Age: 79
End: 2024-04-13
Payer: MEDICARE

## 2024-04-13 VITALS
DIASTOLIC BLOOD PRESSURE: 76 MMHG | BODY MASS INDEX: 33.81 KG/M2 | WEIGHT: 249.6 LBS | HEIGHT: 72 IN | SYSTOLIC BLOOD PRESSURE: 130 MMHG

## 2024-04-13 DIAGNOSIS — I10 HYPERTENSION, UNSPECIFIED TYPE: ICD-10-CM

## 2024-04-13 DIAGNOSIS — N18.9 CHRONIC RENAL FAILURE, UNSPECIFIED CKD STAGE: ICD-10-CM

## 2024-04-13 DIAGNOSIS — E78.00 HYPERCHOLESTEROLEMIA: ICD-10-CM

## 2024-04-13 DIAGNOSIS — L03.116 CELLULITIS OF LEFT LOWER EXTREMITY: Primary | ICD-10-CM

## 2024-04-13 LAB
ALBUMIN SERPL BCP-MCNC: 3.6 G/DL (ref 3.4–5)
ALP SERPL-CCNC: 101 U/L (ref 33–136)
ALT SERPL W P-5'-P-CCNC: 13 U/L (ref 10–52)
ANION GAP SERPL CALC-SCNC: 13 MMOL/L (ref 10–20)
AST SERPL W P-5'-P-CCNC: 15 U/L (ref 9–39)
BILIRUB SERPL-MCNC: 0.6 MG/DL (ref 0–1.2)
BUN SERPL-MCNC: 27 MG/DL (ref 6–23)
CALCIUM SERPL-MCNC: 9.4 MG/DL (ref 8.6–10.6)
CHLORIDE SERPL-SCNC: 109 MMOL/L (ref 98–107)
CO2 SERPL-SCNC: 24 MMOL/L (ref 21–32)
CREAT SERPL-MCNC: 2.02 MG/DL (ref 0.5–1.3)
EGFRCR SERPLBLD CKD-EPI 2021: 33 ML/MIN/1.73M*2
ERYTHROCYTE [DISTWIDTH] IN BLOOD BY AUTOMATED COUNT: 12.2 % (ref 11.5–14.5)
GLUCOSE SERPL-MCNC: 90 MG/DL (ref 74–99)
HCT VFR BLD AUTO: 40.8 % (ref 41–52)
HGB BLD-MCNC: 13.5 G/DL (ref 13.5–17.5)
MCH RBC QN AUTO: 29 PG (ref 26–34)
MCHC RBC AUTO-ENTMCNC: 33.1 G/DL (ref 32–36)
MCV RBC AUTO: 88 FL (ref 80–100)
NRBC BLD-RTO: 0 /100 WBCS (ref 0–0)
PLATELET # BLD AUTO: 316 X10*3/UL (ref 150–450)
POTASSIUM SERPL-SCNC: 4.3 MMOL/L (ref 3.5–5.3)
PROT SERPL-MCNC: 6.9 G/DL (ref 6.4–8.2)
RBC # BLD AUTO: 4.65 X10*6/UL (ref 4.5–5.9)
SODIUM SERPL-SCNC: 142 MMOL/L (ref 136–145)
WBC # BLD AUTO: 9.1 X10*3/UL (ref 4.4–11.3)

## 2024-04-13 PROCEDURE — 99214 OFFICE O/P EST MOD 30 MIN: CPT | Performed by: INTERNAL MEDICINE

## 2024-04-13 PROCEDURE — 36415 COLL VENOUS BLD VENIPUNCTURE: CPT

## 2024-04-13 PROCEDURE — 3075F SYST BP GE 130 - 139MM HG: CPT | Performed by: INTERNAL MEDICINE

## 2024-04-13 PROCEDURE — 1160F RVW MEDS BY RX/DR IN RCRD: CPT | Performed by: INTERNAL MEDICINE

## 2024-04-13 PROCEDURE — 3078F DIAST BP <80 MM HG: CPT | Performed by: INTERNAL MEDICINE

## 2024-04-13 PROCEDURE — 1159F MED LIST DOCD IN RCRD: CPT | Performed by: INTERNAL MEDICINE

## 2024-04-13 PROCEDURE — 80053 COMPREHEN METABOLIC PANEL: CPT

## 2024-04-13 PROCEDURE — 85027 COMPLETE CBC AUTOMATED: CPT

## 2024-04-13 ASSESSMENT — ENCOUNTER SYMPTOMS
LOSS OF SENSATION IN FEET: 0
DEPRESSION: 0
OCCASIONAL FEELINGS OF UNSTEADINESS: 0

## 2024-04-13 NOTE — PROGRESS NOTES
Subjective   Patient ID: Charles Chan is a 78 y.o. male who presents for left leg pain and swelling.    1. Mr. Chan today came here for pain and swelling in the left leg, below knee, above ankle red, inflamed, tender, cellulitis.  2. Follow-up on other conditions.  Appetite and weight are okay.  No problem.    I have personally reviewed the patient's Past Medical History, Medications, Allergies, Social History, and Family History in the EMR.    Review of Systems   All other systems reviewed and are negative.    Objective   /76   Ht 1.829 m (6')   Wt 113 kg (249 lb 9.6 oz)   BMI 33.85 kg/m²     Physical Exam  Vitals reviewed.   Cardiovascular:      Heart sounds: Normal heart sounds, S1 normal and S2 normal. No murmur heard.     No friction rub.   Pulmonary:      Effort: Pulmonary effort is normal.      Breath sounds: Normal breath sounds and air entry.   Abdominal:      Palpations: There is no hepatomegaly, splenomegaly or mass.   Musculoskeletal:      Right lower leg: No edema.      Left lower leg: No edema.   Lymphadenopathy:      Lower Body: No right inguinal adenopathy. No left inguinal adenopathy.   Skin:     Comments: Left leg below knee, above ankle cellulitis present.  Local temperature raised.   Neurological:      Cranial Nerves: Cranial nerves 2-12 are intact.      Sensory: No sensory deficit.      Motor: Motor function is intact.      Deep Tendon Reflexes: Reflexes are normal and symmetric.     LAB WORK:  Laboratory testing ordered.    Assessment/Plan   Problem List Items Addressed This Visit             ICD-10-CM       Cardiac and Vasculature    Hypertension I10    Relevant Orders    CBC    Hypercholesterolemia E78.00    Relevant Orders    Comprehensive Metabolic Panel       Infectious Diseases    Cellulitis - Primary L03.90     Other Visit Diagnoses         Codes    Chronic renal failure, unspecified CKD stage     N18.9        1. Cellulitis leg.  Cipro, doxycycline.  Explained stay  hydrated.  Bacitracin cream.  2. Chronic renal failure.  Keep an eye.  3. Hypertension, okay.  4. High cholesterol, stable.  5. I shall see him in four days time.  6. If situation gets worse, he will call me.  I will see him right away.    Scribe Attestation  By signing my name below, ILeonie Scribe attest that this documentation has been prepared under the direction and in the presence of Moi Pryor MD.

## 2024-04-15 DIAGNOSIS — L03.116 CELLULITIS OF LEFT LOWER EXTREMITY: ICD-10-CM

## 2024-04-15 RX ORDER — CIPROFLOXACIN 500 MG/1
500 TABLET ORAL 2 TIMES DAILY
Qty: 20 TABLET | Refills: 0 | Status: SHIPPED | OUTPATIENT
Start: 2024-04-15 | End: 2024-04-22 | Stop reason: HOSPADM

## 2024-04-15 RX ORDER — BACITRACIN ZINC 500 UNIT/G
OINTMENT (GRAM) TOPICAL 2 TIMES DAILY
Qty: 14 G | Refills: 0 | Status: SHIPPED | OUTPATIENT
Start: 2024-04-15 | End: 2024-04-22 | Stop reason: HOSPADM

## 2024-04-15 RX ORDER — DOXYCYCLINE 100 MG/1
100 CAPSULE ORAL 2 TIMES DAILY
Qty: 20 CAPSULE | Refills: 0 | Status: SHIPPED | OUTPATIENT
Start: 2024-04-15 | End: 2024-04-22 | Stop reason: HOSPADM

## 2024-04-17 ENCOUNTER — HOSPITAL ENCOUNTER (INPATIENT)
Facility: HOSPITAL | Age: 79
LOS: 5 days | Discharge: HOME | DRG: 603 | End: 2024-04-22
Attending: INTERNAL MEDICINE | Admitting: INTERNAL MEDICINE
Payer: MEDICARE

## 2024-04-17 ENCOUNTER — OFFICE VISIT (OUTPATIENT)
Dept: PRIMARY CARE | Facility: CLINIC | Age: 79
End: 2024-04-17
Payer: MEDICARE

## 2024-04-17 ENCOUNTER — APPOINTMENT (OUTPATIENT)
Dept: RADIOLOGY | Facility: HOSPITAL | Age: 79
DRG: 603 | End: 2024-04-17
Payer: MEDICARE

## 2024-04-17 VITALS
DIASTOLIC BLOOD PRESSURE: 76 MMHG | BODY MASS INDEX: 33.72 KG/M2 | SYSTOLIC BLOOD PRESSURE: 130 MMHG | WEIGHT: 249 LBS | HEIGHT: 72 IN

## 2024-04-17 DIAGNOSIS — I25.10 ARTERIOSCLEROTIC CARDIOVASCULAR DISEASE: ICD-10-CM

## 2024-04-17 DIAGNOSIS — N18.9 CHRONIC RENAL FAILURE, UNSPECIFIED CKD STAGE: ICD-10-CM

## 2024-04-17 DIAGNOSIS — I87.2 VENOUS INSUFFICIENCY OF BOTH LOWER EXTREMITIES: ICD-10-CM

## 2024-04-17 DIAGNOSIS — L03.116 CELLULITIS OF LEFT LOWER EXTREMITY: ICD-10-CM

## 2024-04-17 DIAGNOSIS — L03.116 CELLULITIS OF LEFT LEG: Primary | ICD-10-CM

## 2024-04-17 DIAGNOSIS — I87.2 VENOUS STASIS DERMATITIS OF BOTH LOWER EXTREMITIES: ICD-10-CM

## 2024-04-17 PROBLEM — N18.31 STAGE 3A CHRONIC KIDNEY DISEASE (MULTI): Status: ACTIVE | Noted: 2024-04-17

## 2024-04-17 LAB
ALBUMIN SERPL-MCNC: 3.2 G/DL (ref 3.5–5)
ALP BLD-CCNC: 114 U/L (ref 35–125)
ALT SERPL-CCNC: 15 U/L (ref 5–40)
ANION GAP SERPL CALC-SCNC: 9 MMOL/L
AST SERPL-CCNC: 18 U/L (ref 5–40)
BASOPHILS # BLD AUTO: 0.04 X10*3/UL (ref 0–0.1)
BASOPHILS NFR BLD AUTO: 0.5 %
BILIRUB SERPL-MCNC: 0.4 MG/DL (ref 0.1–1.2)
BUN SERPL-MCNC: 22 MG/DL (ref 8–25)
CALCIUM SERPL-MCNC: 8.7 MG/DL (ref 8.5–10.4)
CHLORIDE SERPL-SCNC: 111 MMOL/L (ref 97–107)
CO2 SERPL-SCNC: 22 MMOL/L (ref 24–31)
CREAT SERPL-MCNC: 2.2 MG/DL (ref 0.4–1.6)
EGFRCR SERPLBLD CKD-EPI 2021: 30 ML/MIN/1.73M*2
EOSINOPHIL # BLD AUTO: 0.29 X10*3/UL (ref 0–0.4)
EOSINOPHIL NFR BLD AUTO: 3.6 %
ERYTHROCYTE [DISTWIDTH] IN BLOOD BY AUTOMATED COUNT: 12.3 % (ref 11.5–14.5)
GLUCOSE SERPL-MCNC: 128 MG/DL (ref 65–99)
HCT VFR BLD AUTO: 39.8 % (ref 41–52)
HGB BLD-MCNC: 13.3 G/DL (ref 13.5–17.5)
IMM GRANULOCYTES # BLD AUTO: 0.03 X10*3/UL (ref 0–0.5)
IMM GRANULOCYTES NFR BLD AUTO: 0.4 % (ref 0–0.9)
LYMPHOCYTES # BLD AUTO: 1.66 X10*3/UL (ref 0.8–3)
LYMPHOCYTES NFR BLD AUTO: 20.4 %
MCH RBC QN AUTO: 29.1 PG (ref 26–34)
MCHC RBC AUTO-ENTMCNC: 33.4 G/DL (ref 32–36)
MCV RBC AUTO: 87 FL (ref 80–100)
MONOCYTES # BLD AUTO: 0.68 X10*3/UL (ref 0.05–0.8)
MONOCYTES NFR BLD AUTO: 8.4 %
NEUTROPHILS # BLD AUTO: 5.44 X10*3/UL (ref 1.6–5.5)
NEUTROPHILS NFR BLD AUTO: 66.7 %
NRBC BLD-RTO: 0 /100 WBCS (ref 0–0)
PLATELET # BLD AUTO: 261 X10*3/UL (ref 150–450)
POTASSIUM SERPL-SCNC: 4.2 MMOL/L (ref 3.4–5.1)
PROT SERPL-MCNC: 6.7 G/DL (ref 5.9–7.9)
RBC # BLD AUTO: 4.57 X10*6/UL (ref 4.5–5.9)
SODIUM SERPL-SCNC: 142 MMOL/L (ref 133–145)
WBC # BLD AUTO: 8.1 X10*3/UL (ref 4.4–11.3)

## 2024-04-17 PROCEDURE — 85025 COMPLETE CBC W/AUTO DIFF WBC: CPT | Performed by: INTERNAL MEDICINE

## 2024-04-17 PROCEDURE — 80053 COMPREHEN METABOLIC PANEL: CPT | Performed by: INTERNAL MEDICINE

## 2024-04-17 PROCEDURE — 36415 COLL VENOUS BLD VENIPUNCTURE: CPT | Performed by: INTERNAL MEDICINE

## 2024-04-17 PROCEDURE — 76770 US EXAM ABDO BACK WALL COMP: CPT

## 2024-04-17 PROCEDURE — 99222 1ST HOSP IP/OBS MODERATE 55: CPT | Performed by: INTERNAL MEDICINE

## 2024-04-17 PROCEDURE — 2500000001 HC RX 250 WO HCPCS SELF ADMINISTERED DRUGS (ALT 637 FOR MEDICARE OP): Performed by: INTERNAL MEDICINE

## 2024-04-17 PROCEDURE — 71046 X-RAY EXAM CHEST 2 VIEWS: CPT | Performed by: RADIOLOGY

## 2024-04-17 PROCEDURE — 71046 X-RAY EXAM CHEST 2 VIEWS: CPT

## 2024-04-17 PROCEDURE — 76770 US EXAM ABDO BACK WALL COMP: CPT | Performed by: RADIOLOGY

## 2024-04-17 PROCEDURE — 2500000004 HC RX 250 GENERAL PHARMACY W/ HCPCS (ALT 636 FOR OP/ED): Performed by: INTERNAL MEDICINE

## 2024-04-17 PROCEDURE — 2500000006 HC RX 250 W HCPCS SELF ADMINISTERED DRUGS (ALT 637 FOR ALL PAYERS): Mod: MUE | Performed by: INTERNAL MEDICINE

## 2024-04-17 PROCEDURE — 1100000001 HC PRIVATE ROOM DAILY

## 2024-04-17 RX ORDER — ONDANSETRON 4 MG/1
4 TABLET, ORALLY DISINTEGRATING ORAL EVERY 8 HOURS PRN
Status: DISCONTINUED | OUTPATIENT
Start: 2024-04-17 | End: 2024-04-22 | Stop reason: HOSPADM

## 2024-04-17 RX ORDER — ASPIRIN 325 MG
325 TABLET ORAL DAILY
Status: DISCONTINUED | OUTPATIENT
Start: 2024-04-17 | End: 2024-04-22 | Stop reason: HOSPADM

## 2024-04-17 RX ORDER — METOPROLOL SUCCINATE 25 MG/1
25 TABLET, EXTENDED RELEASE ORAL DAILY
Status: DISCONTINUED | OUTPATIENT
Start: 2024-04-17 | End: 2024-04-22 | Stop reason: HOSPADM

## 2024-04-17 RX ORDER — ALFUZOSIN HYDROCHLORIDE 10 MG/1
10 TABLET, EXTENDED RELEASE ORAL DAILY
Status: DISCONTINUED | OUTPATIENT
Start: 2024-04-17 | End: 2024-04-22 | Stop reason: HOSPADM

## 2024-04-17 RX ORDER — CLOPIDOGREL BISULFATE 75 MG/1
75 TABLET ORAL DAILY
Status: DISCONTINUED | OUTPATIENT
Start: 2024-04-17 | End: 2024-04-22 | Stop reason: HOSPADM

## 2024-04-17 RX ORDER — DOCUSATE SODIUM 100 MG/1
100 CAPSULE, LIQUID FILLED ORAL 2 TIMES DAILY
Status: DISCONTINUED | OUTPATIENT
Start: 2024-04-17 | End: 2024-04-22 | Stop reason: HOSPADM

## 2024-04-17 RX ORDER — AMMONIUM LACTATE 12 G/100G
LOTION TOPICAL
Status: DISCONTINUED | OUTPATIENT
Start: 2024-04-17 | End: 2024-04-22 | Stop reason: HOSPADM

## 2024-04-17 RX ORDER — FUROSEMIDE 40 MG/1
40 TABLET ORAL DAILY
Status: DISCONTINUED | OUTPATIENT
Start: 2024-04-17 | End: 2024-04-22 | Stop reason: HOSPADM

## 2024-04-17 RX ORDER — ONDANSETRON HYDROCHLORIDE 2 MG/ML
4 INJECTION, SOLUTION INTRAVENOUS EVERY 8 HOURS PRN
Status: DISCONTINUED | OUTPATIENT
Start: 2024-04-17 | End: 2024-04-22 | Stop reason: HOSPADM

## 2024-04-17 RX ORDER — ACETAMINOPHEN 650 MG/1
650 SUPPOSITORY RECTAL EVERY 4 HOURS PRN
Status: DISCONTINUED | OUTPATIENT
Start: 2024-04-17 | End: 2024-04-22 | Stop reason: HOSPADM

## 2024-04-17 RX ORDER — POTASSIUM CHLORIDE 20 MEQ/1
20 TABLET, EXTENDED RELEASE ORAL DAILY
Status: DISCONTINUED | OUTPATIENT
Start: 2024-04-17 | End: 2024-04-22 | Stop reason: HOSPADM

## 2024-04-17 RX ORDER — ACETAMINOPHEN 160 MG/5ML
650 SOLUTION ORAL EVERY 4 HOURS PRN
Status: DISCONTINUED | OUTPATIENT
Start: 2024-04-17 | End: 2024-04-22 | Stop reason: HOSPADM

## 2024-04-17 RX ORDER — FINASTERIDE 5 MG/1
5 TABLET, FILM COATED ORAL DAILY
Status: DISCONTINUED | OUTPATIENT
Start: 2024-04-17 | End: 2024-04-22 | Stop reason: HOSPADM

## 2024-04-17 RX ORDER — PANTOPRAZOLE SODIUM 40 MG/1
40 TABLET, DELAYED RELEASE ORAL
Status: DISCONTINUED | OUTPATIENT
Start: 2024-04-18 | End: 2024-04-22 | Stop reason: HOSPADM

## 2024-04-17 RX ORDER — DEXTROSE MONOHYDRATE, SODIUM CHLORIDE, AND POTASSIUM CHLORIDE 50; 1.49; 4.5 G/1000ML; G/1000ML; G/1000ML
100 INJECTION, SOLUTION INTRAVENOUS CONTINUOUS
Status: DISCONTINUED | OUTPATIENT
Start: 2024-04-17 | End: 2024-04-18

## 2024-04-17 RX ORDER — GUAIFENESIN 600 MG/1
600 TABLET, EXTENDED RELEASE ORAL EVERY 12 HOURS PRN
Status: DISCONTINUED | OUTPATIENT
Start: 2024-04-17 | End: 2024-04-22 | Stop reason: HOSPADM

## 2024-04-17 RX ORDER — POLYETHYLENE GLYCOL 3350 17 G/17G
17 POWDER, FOR SOLUTION ORAL DAILY
Status: DISCONTINUED | OUTPATIENT
Start: 2024-04-17 | End: 2024-04-22 | Stop reason: HOSPADM

## 2024-04-17 RX ORDER — GUAIFENESIN/DEXTROMETHORPHAN 100-10MG/5
5 SYRUP ORAL EVERY 4 HOURS PRN
Status: DISCONTINUED | OUTPATIENT
Start: 2024-04-17 | End: 2024-04-22 | Stop reason: HOSPADM

## 2024-04-17 RX ORDER — ENOXAPARIN SODIUM 100 MG/ML
40 INJECTION SUBCUTANEOUS EVERY 24 HOURS
Status: DISCONTINUED | OUTPATIENT
Start: 2024-04-17 | End: 2024-04-22 | Stop reason: HOSPADM

## 2024-04-17 RX ORDER — ROSUVASTATIN CALCIUM 20 MG/1
40 TABLET, COATED ORAL DAILY
Status: DISCONTINUED | OUTPATIENT
Start: 2024-04-17 | End: 2024-04-22 | Stop reason: HOSPADM

## 2024-04-17 RX ORDER — TALC
3 POWDER (GRAM) TOPICAL NIGHTLY PRN
Status: DISCONTINUED | OUTPATIENT
Start: 2024-04-17 | End: 2024-04-22 | Stop reason: HOSPADM

## 2024-04-17 RX ORDER — CEFTRIAXONE 1 G/50ML
1 INJECTION, SOLUTION INTRAVENOUS EVERY 24 HOURS
Status: DISCONTINUED | OUTPATIENT
Start: 2024-04-17 | End: 2024-04-22 | Stop reason: HOSPADM

## 2024-04-17 RX ORDER — AMLODIPINE BESYLATE 10 MG/1
10 TABLET ORAL DAILY
Status: DISCONTINUED | OUTPATIENT
Start: 2024-04-17 | End: 2024-04-22 | Stop reason: HOSPADM

## 2024-04-17 RX ORDER — ACETAMINOPHEN 325 MG/1
650 TABLET ORAL EVERY 4 HOURS PRN
Status: DISCONTINUED | OUTPATIENT
Start: 2024-04-17 | End: 2024-04-22 | Stop reason: HOSPADM

## 2024-04-17 RX ORDER — PANTOPRAZOLE SODIUM 40 MG/10ML
40 INJECTION, POWDER, LYOPHILIZED, FOR SOLUTION INTRAVENOUS
Status: DISCONTINUED | OUTPATIENT
Start: 2024-04-18 | End: 2024-04-22 | Stop reason: HOSPADM

## 2024-04-17 RX ADMIN — DOCUSATE SODIUM 100 MG: 100 CAPSULE, LIQUID FILLED ORAL at 20:09

## 2024-04-17 RX ADMIN — ROSUVASTATIN CALCIUM 40 MG: 20 TABLET, COATED ORAL at 17:02

## 2024-04-17 RX ADMIN — AMLODIPINE BESYLATE 10 MG: 10 TABLET ORAL at 17:01

## 2024-04-17 RX ADMIN — METOPROLOL SUCCINATE 25 MG: 25 TABLET, FILM COATED, EXTENDED RELEASE ORAL at 17:02

## 2024-04-17 RX ADMIN — CEFTRIAXONE SODIUM 1 G: 1 INJECTION, SOLUTION INTRAVENOUS at 18:25

## 2024-04-17 RX ADMIN — DEXTROSE, SODIUM CHLORIDE, AND POTASSIUM CHLORIDE 100 ML/HR: 5; .45; .15 INJECTION INTRAVENOUS at 18:25

## 2024-04-17 RX ADMIN — ASPIRIN 325 MG ORAL TABLET 325 MG: 325 PILL ORAL at 17:01

## 2024-04-17 RX ADMIN — ENOXAPARIN SODIUM 40 MG: 40 INJECTION SUBCUTANEOUS at 18:28

## 2024-04-17 RX ADMIN — FINASTERIDE 5 MG: 5 TABLET, FILM COATED ORAL at 17:01

## 2024-04-17 RX ADMIN — CLOPIDOGREL BISULFATE 75 MG: 75 TABLET ORAL at 17:01

## 2024-04-17 SDOH — ECONOMIC STABILITY: INCOME INSECURITY: IN THE LAST 12 MONTHS, WAS THERE A TIME WHEN YOU WERE NOT ABLE TO PAY THE MORTGAGE OR RENT ON TIME?: NO

## 2024-04-17 SDOH — SOCIAL STABILITY: SOCIAL NETWORK: HOW OFTEN DO YOU GET TOGETHER WITH FRIENDS OR RELATIVES?: MORE THAN THREE TIMES A WEEK

## 2024-04-17 SDOH — HEALTH STABILITY: MENTAL HEALTH
STRESS IS WHEN SOMEONE FEELS TENSE, NERVOUS, ANXIOUS, OR CAN'T SLEEP AT NIGHT BECAUSE THEIR MIND IS TROUBLED. HOW STRESSED ARE YOU?: TO SOME EXTENT

## 2024-04-17 SDOH — ECONOMIC STABILITY: TRANSPORTATION INSECURITY
IN THE PAST 12 MONTHS, HAS LACK OF TRANSPORTATION KEPT YOU FROM MEETINGS, WORK, OR FROM GETTING THINGS NEEDED FOR DAILY LIVING?: NO

## 2024-04-17 SDOH — SOCIAL STABILITY: SOCIAL INSECURITY
WITHIN THE LAST YEAR, HAVE TO BEEN RAPED OR FORCED TO HAVE ANY KIND OF SEXUAL ACTIVITY BY YOUR PARTNER OR EX-PARTNER?: NO

## 2024-04-17 SDOH — SOCIAL STABILITY: SOCIAL NETWORK
IN A TYPICAL WEEK, HOW MANY TIMES DO YOU TALK ON THE PHONE WITH FAMILY, FRIENDS, OR NEIGHBORS?: MORE THAN THREE TIMES A WEEK

## 2024-04-17 SDOH — SOCIAL STABILITY: SOCIAL NETWORK: ARE YOU MARRIED, WIDOWED, DIVORCED, SEPARATED, NEVER MARRIED, OR LIVING WITH A PARTNER?: MARRIED

## 2024-04-17 SDOH — ECONOMIC STABILITY: INCOME INSECURITY: IN THE PAST 12 MONTHS, HAS THE ELECTRIC, GAS, OIL, OR WATER COMPANY THREATENED TO SHUT OFF SERVICE IN YOUR HOME?: NO

## 2024-04-17 SDOH — SOCIAL STABILITY: SOCIAL INSECURITY: WITHIN THE LAST YEAR, HAVE YOU BEEN AFRAID OF YOUR PARTNER OR EX-PARTNER?: NO

## 2024-04-17 SDOH — ECONOMIC STABILITY: INCOME INSECURITY: HOW HARD IS IT FOR YOU TO PAY FOR THE VERY BASICS LIKE FOOD, HOUSING, MEDICAL CARE, AND HEATING?: NOT HARD AT ALL

## 2024-04-17 SDOH — SOCIAL STABILITY: SOCIAL INSECURITY: HAVE YOU HAD ANY THOUGHTS OF HARMING ANYONE ELSE?: NO

## 2024-04-17 SDOH — SOCIAL STABILITY: SOCIAL NETWORK
DO YOU BELONG TO ANY CLUBS OR ORGANIZATIONS SUCH AS CHURCH GROUPS UNIONS, FRATERNAL OR ATHLETIC GROUPS, OR SCHOOL GROUPS?: NO

## 2024-04-17 SDOH — ECONOMIC STABILITY: FOOD INSECURITY: WITHIN THE PAST 12 MONTHS, THE FOOD YOU BOUGHT JUST DIDN'T LAST AND YOU DIDN'T HAVE MONEY TO GET MORE.: NEVER TRUE

## 2024-04-17 SDOH — HEALTH STABILITY: MENTAL HEALTH: HOW OFTEN DO YOU HAVE 6 OR MORE DRINKS ON ONE OCCASION?: NEVER

## 2024-04-17 SDOH — SOCIAL STABILITY: SOCIAL INSECURITY: HAVE YOU HAD THOUGHTS OF HARMING ANYONE ELSE?: NO

## 2024-04-17 SDOH — HEALTH STABILITY: MENTAL HEALTH
HOW OFTEN DO YOU NEED TO HAVE SOMEONE HELP YOU WHEN YOU READ INSTRUCTIONS, PAMPHLETS, OR OTHER WRITTEN MATERIAL FROM YOUR DOCTOR OR PHARMACY?: NEVER

## 2024-04-17 SDOH — SOCIAL STABILITY: SOCIAL NETWORK: HOW OFTEN DO YOU ATTENT MEETINGS OF THE CLUB OR ORGANIZATION YOU BELONG TO?: NEVER

## 2024-04-17 SDOH — SOCIAL STABILITY: SOCIAL INSECURITY: WITHIN THE LAST YEAR, HAVE YOU BEEN HUMILIATED OR EMOTIONALLY ABUSED IN OTHER WAYS BY YOUR PARTNER OR EX-PARTNER?: NO

## 2024-04-17 SDOH — SOCIAL STABILITY: SOCIAL INSECURITY
WITHIN THE LAST YEAR, HAVE YOU BEEN KICKED, HIT, SLAPPED, OR OTHERWISE PHYSICALLY HURT BY YOUR PARTNER OR EX-PARTNER?: NO

## 2024-04-17 SDOH — HEALTH STABILITY: MENTAL HEALTH: HOW MANY STANDARD DRINKS CONTAINING ALCOHOL DO YOU HAVE ON A TYPICAL DAY?: PATIENT DOES NOT DRINK

## 2024-04-17 SDOH — SOCIAL STABILITY: SOCIAL INSECURITY: DOES ANYONE TRY TO KEEP YOU FROM HAVING/CONTACTING OTHER FRIENDS OR DOING THINGS OUTSIDE YOUR HOME?: NO

## 2024-04-17 SDOH — SOCIAL STABILITY: SOCIAL INSECURITY: DO YOU FEEL ANYONE HAS EXPLOITED OR TAKEN ADVANTAGE OF YOU FINANCIALLY OR OF YOUR PERSONAL PROPERTY?: NO

## 2024-04-17 SDOH — SOCIAL STABILITY: SOCIAL INSECURITY: ARE THERE ANY APPARENT SIGNS OF INJURIES/BEHAVIORS THAT COULD BE RELATED TO ABUSE/NEGLECT?: NO

## 2024-04-17 SDOH — ECONOMIC STABILITY: HOUSING INSECURITY
IN THE LAST 12 MONTHS, WAS THERE A TIME WHEN YOU DID NOT HAVE A STEADY PLACE TO SLEEP OR SLEPT IN A SHELTER (INCLUDING NOW)?: NO

## 2024-04-17 SDOH — ECONOMIC STABILITY: FOOD INSECURITY: WITHIN THE PAST 12 MONTHS, YOU WORRIED THAT YOUR FOOD WOULD RUN OUT BEFORE YOU GOT MONEY TO BUY MORE.: NEVER TRUE

## 2024-04-17 SDOH — SOCIAL STABILITY: SOCIAL INSECURITY: ARE YOU OR HAVE YOU BEEN THREATENED OR ABUSED PHYSICALLY, EMOTIONALLY, OR SEXUALLY BY ANYONE?: NO

## 2024-04-17 SDOH — HEALTH STABILITY: MENTAL HEALTH: HOW OFTEN DO YOU HAVE A DRINK CONTAINING ALCOHOL?: NEVER

## 2024-04-17 SDOH — SOCIAL STABILITY: SOCIAL INSECURITY: HAS ANYONE EVER THREATENED TO HURT YOUR FAMILY OR YOUR PETS?: NO

## 2024-04-17 SDOH — HEALTH STABILITY: PHYSICAL HEALTH: ON AVERAGE, HOW MANY DAYS PER WEEK DO YOU ENGAGE IN MODERATE TO STRENUOUS EXERCISE (LIKE A BRISK WALK)?: 7 DAYS

## 2024-04-17 SDOH — SOCIAL STABILITY: SOCIAL INSECURITY: DO YOU FEEL UNSAFE GOING BACK TO THE PLACE WHERE YOU ARE LIVING?: NO

## 2024-04-17 SDOH — SOCIAL STABILITY: SOCIAL INSECURITY: ABUSE: ADULT

## 2024-04-17 SDOH — ECONOMIC STABILITY: TRANSPORTATION INSECURITY
IN THE PAST 12 MONTHS, HAS THE LACK OF TRANSPORTATION KEPT YOU FROM MEDICAL APPOINTMENTS OR FROM GETTING MEDICATIONS?: NO

## 2024-04-17 ASSESSMENT — ENCOUNTER SYMPTOMS
BACK PAIN: 0
MYALGIAS: 0
NERVOUS/ANXIOUS: 0
ARTHRALGIAS: 0
BRUISES/BLEEDS EASILY: 0
WEAKNESS: 0
ABDOMINAL DISTENTION: 0
EYE REDNESS: 0
CONFUSION: 0
FACIAL ASYMMETRY: 0
DIARRHEA: 0
FREQUENCY: 0
COUGH: 0
DIAPHORESIS: 0
WHEEZING: 0
FEVER: 0
STRIDOR: 0
NECK STIFFNESS: 0
ACTIVITY CHANGE: 0
CONSTIPATION: 0
RECTAL PAIN: 0
JOINT SWELLING: 0
UNEXPECTED WEIGHT CHANGE: 0
ABDOMINAL PAIN: 0
VOICE CHANGE: 0
HALLUCINATIONS: 0
DIZZINESS: 0
SHORTNESS OF BREATH: 0
APNEA: 0
VOMITING: 0
SORE THROAT: 0
DYSPHORIC MOOD: 0
HEADACHES: 0
POLYDIPSIA: 0
COLOR CHANGE: 0
NAUSEA: 0
EYE PAIN: 0
LIGHT-HEADEDNESS: 0
DYSURIA: 0
DECREASED CONCENTRATION: 0
POLYPHAGIA: 0
PHOTOPHOBIA: 0
SEIZURES: 0
HEMATURIA: 0
WOUND: 0
FACIAL SWELLING: 0
NECK PAIN: 0
ADENOPATHY: 0
TREMORS: 0
SLEEP DISTURBANCE: 0
APPETITE CHANGE: 0
RHINORRHEA: 0
CHEST TIGHTNESS: 0
TROUBLE SWALLOWING: 0
HYPERACTIVE: 0
SPEECH DIFFICULTY: 0
FATIGUE: 0
SINUS PAIN: 0
EYE DISCHARGE: 0
EYE ITCHING: 0
DIFFICULTY URINATING: 0
BLOOD IN STOOL: 0
SINUS PRESSURE: 0
AGITATION: 0
ANAL BLEEDING: 0
CHOKING: 0
FLANK PAIN: 0
CHILLS: 0
NUMBNESS: 0

## 2024-04-17 ASSESSMENT — ACTIVITIES OF DAILY LIVING (ADL)
LACK_OF_TRANSPORTATION: NO
PATIENT'S MEMORY ADEQUATE TO SAFELY COMPLETE DAILY ACTIVITIES?: NO
JUDGMENT_ADEQUATE_SAFELY_COMPLETE_DAILY_ACTIVITIES: NO
HEARING - LEFT EAR: FUNCTIONAL
HEARING - RIGHT EAR: FUNCTIONAL
DRESSING YOURSELF: INDEPENDENT
FEEDING YOURSELF: INDEPENDENT
WALKS IN HOME: INDEPENDENT
TOILETING: INDEPENDENT
BATHING: INDEPENDENT
ADEQUATE_TO_COMPLETE_ADL: NO
GROOMING: INDEPENDENT
LACK_OF_TRANSPORTATION: NO

## 2024-04-17 ASSESSMENT — LIFESTYLE VARIABLES
HOW MANY STANDARD DRINKS CONTAINING ALCOHOL DO YOU HAVE ON A TYPICAL DAY: PATIENT DOES NOT DRINK
HOW OFTEN DO YOU HAVE A DRINK CONTAINING ALCOHOL: NEVER
SUBSTANCE_ABUSE_PAST_12_MONTHS: NO
HOW OFTEN DO YOU HAVE 6 OR MORE DRINKS ON ONE OCCASION: NEVER
SKIP TO QUESTIONS 9-10: 1
SKIP TO QUESTIONS 9-10: 1
AUDIT-C TOTAL SCORE: 0
AUDIT-C TOTAL SCORE: 0
PRESCIPTION_ABUSE_PAST_12_MONTHS: NO
AUDIT-C TOTAL SCORE: 0

## 2024-04-17 ASSESSMENT — COGNITIVE AND FUNCTIONAL STATUS - GENERAL
MOBILITY SCORE: 24
PATIENT BASELINE BEDBOUND: NO
DAILY ACTIVITIY SCORE: 24

## 2024-04-17 ASSESSMENT — PAIN SCALES - GENERAL
PAINLEVEL_OUTOF10: 0 - NO PAIN
PAINLEVEL_OUTOF10: 0 - NO PAIN

## 2024-04-17 ASSESSMENT — PAIN - FUNCTIONAL ASSESSMENT: PAIN_FUNCTIONAL_ASSESSMENT: 0-10

## 2024-04-17 ASSESSMENT — PATIENT HEALTH QUESTIONNAIRE - PHQ9
1. LITTLE INTEREST OR PLEASURE IN DOING THINGS: NOT AT ALL
2. FEELING DOWN, DEPRESSED OR HOPELESS: NOT AT ALL
SUM OF ALL RESPONSES TO PHQ9 QUESTIONS 1 & 2: 0

## 2024-04-17 ASSESSMENT — COLUMBIA-SUICIDE SEVERITY RATING SCALE - C-SSRS
1. IN THE PAST MONTH, HAVE YOU WISHED YOU WERE DEAD OR WISHED YOU COULD GO TO SLEEP AND NOT WAKE UP?: NO
2. HAVE YOU ACTUALLY HAD ANY THOUGHTS OF KILLING YOURSELF?: NO
6. HAVE YOU EVER DONE ANYTHING, STARTED TO DO ANYTHING, OR PREPARED TO DO ANYTHING TO END YOUR LIFE?: NO

## 2024-04-17 NOTE — PROGRESS NOTES
04/17/24 1708   Discharge Planning   Living Arrangements Spouse/significant other   Support Systems Spouse/significant other;Family members  (lives in a multilevel home)   Assistance Needed independent   Type of Residence Private residence   Number of Stairs to Enter Residence 4   Do you have animals or pets at home? No   Home or Post Acute Services None   Patient expects to be discharged to: Home with no needs   Does the patient need discharge transport arranged? No   Housing Stability   In the last 12 months, was there a time when you were not able to pay the mortgage or rent on time? N   In the last 12 months, was there a time when you did not have a steady place to sleep or slept in a shelter (including now)? N   Transportation Needs   In the past 12 months, has lack of transportation kept you from medical appointments or from getting medications? no  (patient drives)   In the past 12 months, has lack of transportation kept you from meetings, work, or from getting things needed for daily living? No     Charles Chan is a 78 y.o. male presenting with left leg cellulitis and Nausea vomiting from antibiotics.    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  He has issues with swelling in the legs for a while he has no circulation issues and stasis dermatitis in the leg but he is not very compliant with wearing compression stockings not very compliant with keeping the legs elevated.  3 weeks ago he started having increased swelling in the legs increased redness in the left leg more than the right.    Patient lives with wife in a multilevel home, independent with care, works and drives. Has no outside services. Has a supportive family and they help with the cooking and cleaning.     PLAN Discharge to home with no needs

## 2024-04-17 NOTE — H&P
History Of Present Illness  Charles Chan is a 78 y.o. male presenting with left leg cellulitis and Nausea vomiting from antibiotics.  Patient has history of coronary artery disease with 2 stents placed, hypertension, hyperlipidemia, chronic kidney disease, colectomy for diverticular abscess ruptured, appendectomy, cholecystectomy.  Patient did not have a echocardiogram done recently.  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  He has issues with swelling in the legs for a while he has no circulation issues and stasis dermatitis in the leg but he is not very compliant with wearing compression stockings not very compliant with keeping the legs elevated.  3 weeks ago he started having increased swelling in the legs increased redness in the left leg more than the right.  He tried to treat it with leg elevation and compression stocking but symptoms kept getting worse so finally he came to see his PCP on Saturday but did not get antibiotic started until Monday.  He started Cipro and Doxy and he could not tolerate the antibiotics.  He started getting sick on it felt nauseous and today went back to see him and he decided to admit him because patient was getting dehydrated and the cellulitis was not improving and getting worse.  Past Medical History  He has a past medical history of BPH (benign prostatic hyperplasia), Chronic renal disease, stage 2, mildly decreased glomerular filtration rate (GFR) between 60-89 mL/min/1.73 square meter, High cholesterol, Hypertension, Hypothyroid, Overweight, Personal history of other diseases of the circulatory system, Personal history of other diseases of the digestive system (10/27/2021), and Prediabetes.  AAA  Surgical History  He has a past surgical history that includes Gallbladder surgery (06/13/2013); Colon surgery (06/13/2013); and Appendectomy (06/13/2013).     Social History  He reports that he has never smoked. He has never used  smokeless tobacco. He reports that he does not drink alcohol and does not use drugs.    Family History  Family History   Problem Relation Name Age of Onset    Hypertension Mother      Heart disease Mother      Stroke Father          Allergies  Patient has no known allergies.    Review of Systems   Constitutional:  Negative for activity change, appetite change, chills, diaphoresis, fatigue, fever and unexpected weight change.   HENT:  Negative for congestion, dental problem, drooling, ear discharge, ear pain, facial swelling, hearing loss, mouth sores, nosebleeds, postnasal drip, rhinorrhea, sinus pressure, sinus pain, sneezing, sore throat, tinnitus, trouble swallowing and voice change.    Eyes:  Negative for photophobia, pain, discharge, redness, itching and visual disturbance.   Respiratory:  Negative for apnea, cough, choking, chest tightness, shortness of breath, wheezing and stridor.    Cardiovascular:  Positive for leg swelling. Negative for chest pain.   Gastrointestinal:  Negative for abdominal distention, abdominal pain, anal bleeding, blood in stool, constipation, diarrhea, nausea, rectal pain and vomiting.   Endocrine: Negative for cold intolerance, heat intolerance, polydipsia, polyphagia and polyuria.   Genitourinary:  Negative for decreased urine volume, difficulty urinating, dysuria, enuresis, flank pain, frequency, genital sores, hematuria and urgency.   Musculoskeletal:  Negative for arthralgias, back pain, gait problem, joint swelling, myalgias, neck pain and neck stiffness.   Skin:  Positive for rash. Negative for color change, pallor and wound.   Allergic/Immunologic: Negative for environmental allergies, food allergies and immunocompromised state.   Neurological:  Negative for dizziness, tremors, seizures, syncope, facial asymmetry, speech difficulty, weakness, light-headedness, numbness and headaches.   Hematological:  Negative for adenopathy. Does not bruise/bleed easily.  "  Psychiatric/Behavioral:  Negative for agitation, behavioral problems, confusion, decreased concentration, dysphoric mood, hallucinations, self-injury, sleep disturbance and suicidal ideas. The patient is not nervous/anxious and is not hyperactive.         Physical Exam  Vitals reviewed.   Constitutional:       Appearance: Normal appearance.   HENT:      Head: Normocephalic and atraumatic.      Right Ear: Tympanic membrane, ear canal and external ear normal.      Left Ear: Tympanic membrane, ear canal and external ear normal.      Nose: Nose normal.      Mouth/Throat:      Pharynx: Oropharynx is clear.   Eyes:      Extraocular Movements: Extraocular movements intact.      Conjunctiva/sclera: Conjunctivae normal.      Pupils: Pupils are equal, round, and reactive to light.   Cardiovascular:      Rate and Rhythm: Normal rate and regular rhythm.      Pulses: Normal pulses.      Heart sounds: Normal heart sounds.   Pulmonary:      Effort: Pulmonary effort is normal.      Breath sounds: Normal breath sounds.   Abdominal:      General: Abdomen is flat. Bowel sounds are normal.      Palpations: Abdomen is soft.   Musculoskeletal:      Cervical back: Normal range of motion and neck supple.      Right lower leg: Edema present.      Left lower leg: Edema present.   Skin:     General: Skin is warm and dry.      Findings: Erythema present.      Comments: Left leg cellulitis on top of stasis dermatitis, right leg stasis dermatitis   Neurological:      General: No focal deficit present.      Mental Status: He is alert and oriented to person, place, and time.   Psychiatric:         Mood and Affect: Mood normal.          Last Recorded Vitals  Blood pressure (!) 164/92, pulse (!) 118, temperature 36.6 °C (97.9 °F), temperature source Oral, resp. rate 19, height 1.854 m (6' 1\"), weight 114 kg (250 lb 10.6 oz), SpO2 93%.    Relevant Results        Scheduled medications  alfuzosin, 10 mg, oral, Daily  amLODIPine, 10 mg, oral, " Daily  aspirin, 325 mg, oral, Daily  cefTRIAXone, 1 g, intravenous, q24h  clopidogrel, 75 mg, oral, Daily  docusate sodium, 100 mg, oral, BID  enoxaparin, 40 mg, subcutaneous, q24h  finasteride, 5 mg, oral, Daily  furosemide, 40 mg, oral, Daily  metoprolol succinate XL, 25 mg, oral, Daily  [START ON 4/18/2024] pantoprazole, 40 mg, oral, Daily before breakfast   Or  [START ON 4/18/2024] pantoprazole, 40 mg, intravenous, Daily before breakfast  polyethylene glycol, 17 g, oral, Daily  potassium chloride CR, 20 mEq, oral, Daily  rosuvastatin, 40 mg, oral, Daily      Continuous medications  potassium amphtld-K7-1.45%NaCl, 100 mL/hr      PRN medications  PRN medications: acetaminophen **OR** acetaminophen **OR** acetaminophen, ammonium lactate, benzocaine-menthol, dextromethorphan-guaifenesin, guaiFENesin, melatonin, ondansetron ODT **OR** ondansetron         Assessment/Plan   Principal Problem:    Cellulitis of left leg  Active Problems:    AAA (abdominal aortic aneurysm) ([facility])    Arteriosclerotic cardiovascular disease    Dehydration    Esophageal reflux    Hypertension    Pure hypercholesterolemia    Venous stasis dermatitis of both lower extremities    Venous insufficiency of both lower extremities    Stage 3a chronic kidney disease (Multi)      Will start IV antibiotics  Will check labs  Will check ultrasound kidneys as her kidney function has been getting worse  Will check 2D echo  IV Zofran for nausea  IV Protonix for acid reflux  Gentle IV fluids  Low-salt diet  Patient is at risk for sleep apnea advised patient to follow-up for sleep studies  Continue home medications  DVT prophylaxis  See orders for details       I spent  minutes in the professional and overall care of this patient.      Ana Pryor MD

## 2024-04-17 NOTE — PROGRESS NOTES
04/17/24 1708   [facility] Disability Status   Are you deaf or do you have serious difficulty hearing? N   Are you blind or do you have serious difficulty seeing, even when wearing glasses? N   Because of a physical, mental, or emotional condition, do you have serious difficulty concentrating, remembering, or making decisions? (5 years old or older) N   Do you have serious difficulty walking or climbing stairs? N   Do you have serious difficulty dressing or bathing? N   Because of a physical, mental, or emotional condition, do you have serious difficulty doing errands alone such as visiting the doctor? N

## 2024-04-17 NOTE — PROGRESS NOTES
04/17/24 1713   Physical Activity   On average, how many days per week do you engage in moderate to strenuous exercise (like a brisk walk)? 7 days   Financial Resource Strain   How hard is it for you to pay for the very basics like food, housing, medical care, and heating? Not hard   Housing Stability   In the last 12 months, was there a time when you were not able to pay the mortgage or rent on time? N   In the last 12 months, was there a time when you did not have a steady place to sleep or slept in a shelter (including now)? N   Transportation Needs   In the past 12 months, has lack of transportation kept you from medical appointments or from getting medications? no   In the past 12 months, has lack of transportation kept you from meetings, work, or from getting things needed for daily living? No   Food Insecurity   Within the past 12 months, you worried that your food would run out before you got the money to buy more. Never true   Within the past 12 months, the food you bought just didn't last and you didn't have money to get more. Never true   Stress   Do you feel stress - tense, restless, nervous, or anxious, or unable to sleep at night because your mind is troubled all the time - these days? To some exte   Social Connections   In a typical week, how many times do you talk on the phone with family, friends, or neighbors? More than 3   How often do you get together with friends or relatives? More than 3   Do you belong to any clubs or organizations such as [employer] groups, unions, fraternal or athletic groups, or school groups? No   How often do you attend meetings of the clubs or organizations you belong to? Never   Are you , , , , never , or living with a partner?    Intimate Partner Violence   Within the last year, have you been afraid of your partner or ex-partner? No   Within the last year, have you been humiliated or emotionally abused in other ways by your  partner or ex-partner? No   Within the last year, have you been kicked, hit, slapped, or otherwise physically hurt by your partner or ex-partner? No   Within the last year, have you been raped or forced to have any kind of sexual activity by your partner or ex-partner? No   Alcohol Use   Q1: How often do you have a drink containing alcohol? Never   Q2: How many drinks containing alcohol do you have on a typical day when you are drinking? None   Q3: How often do you have six or more drinks on one occasion? Never   Utilities   In the past 12 months has the electric, gas, oil, or water company threatened to shut off services in your home? No   Health Literacy   How often do you need to have someone help you when you read instructions, pamphlets, or other written material from your doctor or pharmacy? Never     He reports that he has never smoked. He has never used smokeless tobacco. He reports that he does not drink alcohol and does not use drugs.

## 2024-04-17 NOTE — NURSING NOTE
Assumed care of patient at this time, no needs voiced at this time, no report of pain or discomfort, call light within reach safety measures remain in place.

## 2024-04-18 ENCOUNTER — APPOINTMENT (OUTPATIENT)
Dept: RADIOLOGY | Facility: HOSPITAL | Age: 79
DRG: 603 | End: 2024-04-18
Payer: MEDICARE

## 2024-04-18 ENCOUNTER — APPOINTMENT (OUTPATIENT)
Dept: CARDIOLOGY | Facility: HOSPITAL | Age: 79
DRG: 603 | End: 2024-04-18
Payer: MEDICARE

## 2024-04-18 LAB
ALBUMIN SERPL-MCNC: 3.1 G/DL (ref 3.5–5)
ALP BLD-CCNC: 109 U/L (ref 35–125)
ALT SERPL-CCNC: 11 U/L (ref 5–40)
ANION GAP SERPL CALC-SCNC: 4 MMOL/L
AORTIC VALVE MEAN GRADIENT: 3 MMHG
AORTIC VALVE PEAK VELOCITY: 1.34 M/S
AST SERPL-CCNC: 14 U/L (ref 5–40)
AV PEAK GRADIENT: 7.2 MMHG
AVA (PEAK VEL): 2.79 CM2
AVA (VTI): 2.92 CM2
BILIRUB SERPL-MCNC: 0.3 MG/DL (ref 0.1–1.2)
BUN SERPL-MCNC: 23 MG/DL (ref 8–25)
CALCIUM SERPL-MCNC: 8.4 MG/DL (ref 8.5–10.4)
CHLORIDE SERPL-SCNC: 111 MMOL/L (ref 97–107)
CO2 SERPL-SCNC: 23 MMOL/L (ref 24–31)
CREAT SERPL-MCNC: 2.1 MG/DL (ref 0.4–1.6)
EGFRCR SERPLBLD CKD-EPI 2021: 31 ML/MIN/1.73M*2
EJECTION FRACTION APICAL 4 CHAMBER: 55.6
ERYTHROCYTE [DISTWIDTH] IN BLOOD BY AUTOMATED COUNT: 12.3 % (ref 11.5–14.5)
GLUCOSE SERPL-MCNC: 113 MG/DL (ref 65–99)
HCT VFR BLD AUTO: 37.6 % (ref 41–52)
HGB BLD-MCNC: 12.1 G/DL (ref 13.5–17.5)
LEFT ATRIUM VOLUME AREA LENGTH INDEX BSA: 25.1 ML/M2
LEFT VENTRICLE INTERNAL DIMENSION DIASTOLE: 4.03 CM (ref 3.5–6)
LEFT VENTRICULAR OUTFLOW TRACT DIAMETER: 2.1 CM
MCH RBC QN AUTO: 28.5 PG (ref 26–34)
MCHC RBC AUTO-ENTMCNC: 32.2 G/DL (ref 32–36)
MCV RBC AUTO: 89 FL (ref 80–100)
MITRAL VALVE E/A RATIO: 0.9
MITRAL VALVE E/E' RATIO: 14.89
NRBC BLD-RTO: 0 /100 WBCS (ref 0–0)
PLATELET # BLD AUTO: 233 X10*3/UL (ref 150–450)
POTASSIUM SERPL-SCNC: 4.3 MMOL/L (ref 3.4–5.1)
PROT SERPL-MCNC: 6 G/DL (ref 5.9–7.9)
RBC # BLD AUTO: 4.25 X10*6/UL (ref 4.5–5.9)
RIGHT VENTRICLE FREE WALL PEAK S': 14.3 CM/S
RIGHT VENTRICLE PEAK SYSTOLIC PRESSURE: 36.4 MMHG
SODIUM SERPL-SCNC: 138 MMOL/L (ref 133–145)
TRICUSPID ANNULAR PLANE SYSTOLIC EXCURSION: 2.8 CM
WBC # BLD AUTO: 7.7 X10*3/UL (ref 4.4–11.3)

## 2024-04-18 PROCEDURE — 1100000001 HC PRIVATE ROOM DAILY

## 2024-04-18 PROCEDURE — 97161 PT EVAL LOW COMPLEX 20 MIN: CPT | Mod: GP

## 2024-04-18 PROCEDURE — 85027 COMPLETE CBC AUTOMATED: CPT | Performed by: INTERNAL MEDICINE

## 2024-04-18 PROCEDURE — 2500000004 HC RX 250 GENERAL PHARMACY W/ HCPCS (ALT 636 FOR OP/ED): Performed by: INTERNAL MEDICINE

## 2024-04-18 PROCEDURE — 2500000001 HC RX 250 WO HCPCS SELF ADMINISTERED DRUGS (ALT 637 FOR MEDICARE OP): Performed by: INTERNAL MEDICINE

## 2024-04-18 PROCEDURE — 97165 OT EVAL LOW COMPLEX 30 MIN: CPT | Mod: GO

## 2024-04-18 PROCEDURE — 36415 COLL VENOUS BLD VENIPUNCTURE: CPT | Performed by: INTERNAL MEDICINE

## 2024-04-18 PROCEDURE — 84075 ASSAY ALKALINE PHOSPHATASE: CPT | Performed by: INTERNAL MEDICINE

## 2024-04-18 PROCEDURE — 74183 MRI ABD W/O CNTR FLWD CNTR: CPT

## 2024-04-18 PROCEDURE — 2550000001 HC RX 255 CONTRASTS: Performed by: INTERNAL MEDICINE

## 2024-04-18 PROCEDURE — 93306 TTE W/DOPPLER COMPLETE: CPT

## 2024-04-18 PROCEDURE — 74183 MRI ABD W/O CNTR FLWD CNTR: CPT | Performed by: STUDENT IN AN ORGANIZED HEALTH CARE EDUCATION/TRAINING PROGRAM

## 2024-04-18 PROCEDURE — 93306 TTE W/DOPPLER COMPLETE: CPT | Performed by: INTERNAL MEDICINE

## 2024-04-18 PROCEDURE — A9575 INJ GADOTERATE MEGLUMI 0.1ML: HCPCS | Performed by: INTERNAL MEDICINE

## 2024-04-18 PROCEDURE — 99233 SBSQ HOSP IP/OBS HIGH 50: CPT | Performed by: INTERNAL MEDICINE

## 2024-04-18 PROCEDURE — 2500000006 HC RX 250 W HCPCS SELF ADMINISTERED DRUGS (ALT 637 FOR ALL PAYERS): Mod: MUE | Performed by: INTERNAL MEDICINE

## 2024-04-18 RX ORDER — GADOTERATE MEGLUMINE 376.9 MG/ML
20 INJECTION INTRAVENOUS
Status: COMPLETED | OUTPATIENT
Start: 2024-04-18 | End: 2024-04-18

## 2024-04-18 RX ADMIN — ALFUZOSIN HYDROCHLORIDE 10 MG: 10 TABLET, EXTENDED RELEASE ORAL at 08:31

## 2024-04-18 RX ADMIN — AMLODIPINE BESYLATE 10 MG: 10 TABLET ORAL at 08:31

## 2024-04-18 RX ADMIN — ROSUVASTATIN CALCIUM 40 MG: 20 TABLET, COATED ORAL at 08:32

## 2024-04-18 RX ADMIN — ASPIRIN 325 MG ORAL TABLET 325 MG: 325 PILL ORAL at 08:31

## 2024-04-18 RX ADMIN — GADOTERATE MEGLUMINE 20 ML: 376.9 INJECTION INTRAVENOUS at 22:18

## 2024-04-18 RX ADMIN — METOPROLOL SUCCINATE 25 MG: 25 TABLET, FILM COATED, EXTENDED RELEASE ORAL at 08:32

## 2024-04-18 RX ADMIN — PANTOPRAZOLE SODIUM 40 MG: 40 TABLET, DELAYED RELEASE ORAL at 05:09

## 2024-04-18 RX ADMIN — FINASTERIDE 5 MG: 5 TABLET, FILM COATED ORAL at 08:31

## 2024-04-18 RX ADMIN — CLOPIDOGREL BISULFATE 75 MG: 75 TABLET ORAL at 08:32

## 2024-04-18 RX ADMIN — CEFTRIAXONE SODIUM 1 G: 1 INJECTION, SOLUTION INTRAVENOUS at 17:30

## 2024-04-18 RX ADMIN — ENOXAPARIN SODIUM 40 MG: 40 INJECTION SUBCUTANEOUS at 17:31

## 2024-04-18 ASSESSMENT — ACTIVITIES OF DAILY LIVING (ADL)
ADL_ASSISTANCE: INDEPENDENT
ADL_ASSISTANCE: INDEPENDENT
BATHING_ASSISTANCE: INDEPENDENT

## 2024-04-18 ASSESSMENT — PAIN SCALES - GENERAL
PAINLEVEL_OUTOF10: 0 - NO PAIN
PAINLEVEL_OUTOF10: 0 - NO PAIN

## 2024-04-18 ASSESSMENT — COGNITIVE AND FUNCTIONAL STATUS - GENERAL
DAILY ACTIVITIY SCORE: 24
CLIMB 3 TO 5 STEPS WITH RAILING: A LITTLE
MOBILITY SCORE: 23

## 2024-04-18 ASSESSMENT — PAIN - FUNCTIONAL ASSESSMENT
PAIN_FUNCTIONAL_ASSESSMENT: 0-10

## 2024-04-18 NOTE — PROGRESS NOTES
"Charles Chan is a 78 y.o. male on day 1 of admission presenting with Cellulitis of left leg.    Subjective   Left leg erythema is doing better.  Swelling is little better.  Pain is doing little better       Objective     Physical Exam  Vitals reviewed.   Constitutional:       Appearance: Normal appearance.   HENT:      Head: Normocephalic and atraumatic.      Right Ear: Tympanic membrane, ear canal and external ear normal.      Left Ear: Tympanic membrane, ear canal and external ear normal.      Nose: Nose normal.      Mouth/Throat:      Pharynx: Oropharynx is clear.   Eyes:      Extraocular Movements: Extraocular movements intact.      Conjunctiva/sclera: Conjunctivae normal.      Pupils: Pupils are equal, round, and reactive to light.   Cardiovascular:      Rate and Rhythm: Normal rate and regular rhythm.      Pulses: Normal pulses.      Heart sounds: Normal heart sounds.   Pulmonary:      Effort: Pulmonary effort is normal.      Breath sounds: Normal breath sounds.   Abdominal:      General: Abdomen is flat. Bowel sounds are normal.      Palpations: Abdomen is soft.   Musculoskeletal:      Cervical back: Normal range of motion and neck supple.      Right lower leg: Edema present.      Left lower leg: Edema present.   Skin:     General: Skin is warm and dry.      Findings: Erythema present.      Comments: Left leg cellulitis is improving   Neurological:      General: No focal deficit present.      Mental Status: He is alert and oriented to person, place, and time.   Psychiatric:         Mood and Affect: Mood normal.         Last Recorded Vitals  Blood pressure 160/60, pulse 55, temperature 36.8 °C (98.2 °F), temperature source Oral, resp. rate 16, height 1.854 m (6' 1\"), weight 114 kg (250 lb 10.6 oz), SpO2 97%.  Intake/Output last 3 Shifts:  I/O last 3 completed shifts:  In: 1050 (9.2 mL/kg) [IV Piggyback:1050]  Out: 750 (6.6 mL/kg) [Urine:750 (0.2 mL/kg/hr)]  Weight: 113.7 kg     Relevant Results            "   Scheduled medications  alfuzosin, 10 mg, oral, Daily  amLODIPine, 10 mg, oral, Daily  aspirin, 325 mg, oral, Daily  cefTRIAXone, 1 g, intravenous, q24h  clopidogrel, 75 mg, oral, Daily  docusate sodium, 100 mg, oral, BID  enoxaparin, 40 mg, subcutaneous, q24h  finasteride, 5 mg, oral, Daily  [Held by provider] furosemide, 40 mg, oral, Daily  metoprolol succinate XL, 25 mg, oral, Daily  pantoprazole, 40 mg, oral, Daily before breakfast   Or  pantoprazole, 40 mg, intravenous, Daily before breakfast  polyethylene glycol, 17 g, oral, Daily  potassium chloride CR, 20 mEq, oral, Daily  rosuvastatin, 40 mg, oral, Daily      Continuous medications     PRN medications  PRN medications: acetaminophen **OR** acetaminophen **OR** acetaminophen, ammonium lactate, benzocaine-menthol, dextromethorphan-guaifenesin, guaiFENesin, melatonin, ondansetron ODT **OR** ondansetron  Results for orders placed or performed during the hospital encounter of 04/17/24 (from the past 24 hour(s))   CBC   Result Value Ref Range    WBC 7.7 4.4 - 11.3 x10*3/uL    nRBC 0.0 0.0 - 0.0 /100 WBCs    RBC 4.25 (L) 4.50 - 5.90 x10*6/uL    Hemoglobin 12.1 (L) 13.5 - 17.5 g/dL    Hematocrit 37.6 (L) 41.0 - 52.0 %    MCV 89 80 - 100 fL    MCH 28.5 26.0 - 34.0 pg    MCHC 32.2 32.0 - 36.0 g/dL    RDW 12.3 11.5 - 14.5 %    Platelets 233 150 - 450 x10*3/uL   Comprehensive metabolic panel   Result Value Ref Range    Glucose 113 (H) 65 - 99 mg/dL    Sodium 138 133 - 145 mmol/L    Potassium 4.3 3.4 - 5.1 mmol/L    Chloride 111 (H) 97 - 107 mmol/L    Bicarbonate 23 (L) 24 - 31 mmol/L    Urea Nitrogen 23 8 - 25 mg/dL    Creatinine 2.10 (H) 0.40 - 1.60 mg/dL    eGFR 31 (L) >60 mL/min/1.73m*2    Calcium 8.4 (L) 8.5 - 10.4 mg/dL    Albumin 3.1 (L) 3.5 - 5.0 g/dL    Alkaline Phosphatase 109 35 - 125 U/L    Total Protein 6.0 5.9 - 7.9 g/dL    AST 14 5 - 40 U/L    Bilirubin, Total 0.3 0.1 - 1.2 mg/dL    ALT 11 5 - 40 U/L    Anion Gap 4 <=19 mmol/L   Transthoracic Echo (TTE)  Complete   Result Value Ref Range    AV pk carmencita 1.34 m/s    LVOT diam 2.10 cm    AV mn grad 3.0 mmHg    MV E/A ratio 0.90     Tricuspid annular plane systolic excursion 2.8 cm    LA vol index A/L 25.1 ml/m2    MV avg E/e' ratio 14.89     RV free wall pk S' 14.30 cm/s    RVSP 36.4 mmHg    LVIDd 4.03 cm    AV pk grad 7.2 mmHg    Aortic Valve Area by Continuity of VTI 2.92 cm2    Aortic Valve Area by Continuity of Peak Velocity 2.79 cm2    LV A4C EF 55.6      Transthoracic Echo (TTE) Complete    Result Date: 4/18/2024            [facility] [address], [place] [id number]             Phone [phone] TRANSTHORACIC ECHOCARDIOGRAM REPORT  Patient Name:     CURTIS Peterson Physician:  [id number] Harvey Willis MD Study Date:       4/18/2024            Ordering Provider:  [id number] JESUSITA BLACKBURN MRN/PID:          [id number]             Fellow: Accession#:       [id number]         Nurse: Date of           1945 / 78 years  Sonographer:        Teresa Gross RD Birth/Age: Gender:           M                    Additional Staff:   Tamera Beckman Student Height:           182.88 cm            Admit Date: Weight:           113.40 kg            Admission Status:   Inpatient - Routine BSA / BMI:        2.34 m2 / 33.91      Department          [facility]                   kg/m2                Location: Blood Pressure: 166 /72 mmHg Study Type:    TRANSTHORACIC ECHO (TTE) COMPLETE Diagnosis/ICD: Atherosclerotic heart disease of native coronary artery without                angina pectoris-I25.10 Indication:    CAD CPT Codes:     Echo Complete w Full [id number] Patient History: Pertinent History: HTN. AAA, carotid stenosis, hypercholesterolemia, CP, SOB. Study Detail: The following Echo studies were performed: 2D, M-Mode, Doppler and               color flow.  PHYSICIAN INTERPRETATION: Left Ventricle: Left ventricular systolic function is  normal, with an estimated ejection fraction of 60-65%. There are no regional wall motion abnormalities. The left ventricular cavity size is normal. Spectral Doppler shows a normal pattern of left ventricular diastolic filling. Left Atrium: The left atrium is mildly dilated. Right Ventricle: The right ventricle is normal in size. There is normal right ventriclar wall thickness. There is normal right ventricular global systolic function. Right Atrium: The right atrium is normal in size. Aortic Valve: The aortic valve appears structurally normal. The aortic valve appears tricuspid and non-restricted. There is trivial aortic valve regurgitation. The peak instantaneous gradient of the aortic valve is 7.2 mmHg. The mean gradient of the aortic valve is 3.0 mmHg. Mitral Valve: The mitral valve is normal in structure. There is normal mitral valve leaflet mobility. There is trace mitral valve regurgitation. Tricuspid Valve: The tricuspid valve is structurally normal. There is normal tricuspid valve leaflet mobility. There is mild tricuspid regurgitation. The Doppler estimated RVSP is slightly elevated at 36.4 mmHg. Pulmonic Valve: The pulmonic valve is structurally normal. There is trace pulmonic valve regurgitation. Pericardium: There is no pericardial effusion noted. Aorta: The aortic root is normal. There is no dilatation of the aortic arch. There is mild dilatation of the ascending aorta. There is no dilatation of the aortic root. Pulmonary Artery: The pulmonary artery is normal in size. The tricuspid regurgitant velocity is 2.89 m/s, and with an estimated right atrial pressure of 3 mmHg, the estimated pulmonary artery pressure is borderline elevated with the RVSP at 36.4 mmHg. The estimated PASP is 36 mmHg. Systemic Veins: The inferior vena cava appears to be of normal size. There is IVC inspiratory collapse greater than 50%.  CONCLUSIONS:  1. Left ventricular systolic function is normal with a 60-65% estimated ejection  fraction.  2. Mild tricuspid regurgitation is visualized.  3. Slightly elevated RVSP.  4. The estimated PASP is 36 mmHg. QUANTITATIVE DATA SUMMARY: 2D MEASUREMENTS:                          Normal Ranges: IVSd:          1.04 cm   (0.6-1.1cm) LVPWd:         1.20 cm   (0.6-1.1cm) LVIDd:         4.03 cm   (3.9-5.9cm) LVIDs:         2.83 cm LV Mass Index: 64.6 g/m2 LV % FS        29.8 % LA VOLUME:                               Normal Ranges: LA Vol A4C:        40.4 ml    (22+/-6mL/m2) LA Vol A2C:        71.2 ml LA Vol BP:         58.8 ml LA Vol Index A4C:  17.3ml/m2 LA Vol Index A2C:  30.4 ml/m2 LA Vol Index BP:   25.1 ml/m2 LA Area A4C:       15.2 cm2 LA Area A2C:       22.1 cm2 LA Major Axis A4C: 4.9 cm LA Major Axis A2C: 5.8 cm LA Volume Index:   23.9 ml/m2 LA Vol A4C:        38.6 ml LA Vol A2C:        67.8 ml RA VOLUME BY A/L METHOD:                               Normal Ranges: RA Vol A4C:        29.2 ml    (8.3-19.5ml) RA Vol Index A4C:  12.5 ml/m2 RA Area A4C:       13.2 cm2 RA Major Axis A4C: 5.1 cm M-MODE MEASUREMENTS:                  Normal Ranges: Ao Root: 2.80 cm (2.0-3.7cm) AORTA MEASUREMENTS:                      Normal Ranges: Ao Sinus, d: 3.25 cm (2.1-3.5cm) Asc Ao, d:   3.80 cm (2.1-3.4cm) LV SYSTOLIC FUNCTION BY 2D PLANIMETRY (MOD):                     Normal Ranges: EF-A4C View: 55.6 % (>=55%) LV DIASTOLIC FUNCTION:                        Normal Ranges: MV Peak E:    0.97 m/s (0.7-1.2 m/s) MV Peak A:    1.07 m/s (0.42-0.7 m/s) E/A Ratio:    0.90     (1.0-2.2) MV e'         0.06 m/s (>8.0) MV lateral e' 0.07 m/s MV medial e'  0.06 m/s E/e' Ratio:   14.89    (<8.0) MITRAL VALVE:                 Normal Ranges: MV DT: 213 msec (150-240msec) AORTIC VALVE:                                   Normal Ranges: AoV Vmax:                1.34 m/s (<=1.7m/s) AoV Peak P.2 mmHg (<20mmHg) AoV Mean PG:             3.0 mmHg (1.7-11.5mmHg) LVOT Max Christofer:            1.08 m/s (<=1.1m/s) AoV VTI:                  30.00 cm (18-25cm) LVOT VTI:                25.30 cm LVOT Diameter:           2.10 cm  (1.8-2.4cm) AoV Area, VTI:           2.92 cm2 (2.5-5.5cm2) AoV Area,Vmax:           2.79 cm2 (2.5-4.5cm2) AoV Dimensionless Index: 0.84  RIGHT VENTRICLE: RV Basal 3.56 cm RV Mid   2.41 cm RV Major 7.0 cm TAPSE:   28.1 mm RV s'    0.14 m/s TRICUSPID VALVE/RVSP:                             Normal Ranges: Peak TR Velocity: 2.89 m/s RV Syst Pressure: 36.4 mmHg (< 30mmHg) IVC Diam:         1.82 cm PULMONIC VALVE:                         Normal Ranges: PV Accel Time: 74 msec  (>120ms) PV Max Christofer:    1.1 m/s  (0.6-0.9m/s) PV Max P.5 mmHg  [id number] Harvey Willis MD Electronically signed on 2024 at 12:53:16 PM  ** Final **     US renal complete    Result Date: 2024  Interpreted By:  Joseph Sawyer, STUDY: US RENAL COMPLETE;  2024 6:18 pm   INDICATION: Signs/Symptoms:Renal insufficiency.   COMPARISON: 10/31/2022 abdomen CT   ACCESSION NUMBER(S): [id number]   ORDERING CLINICIAN: JESUSITA BLACKBURN   TECHNIQUE: Multiple images of the kidneys were obtained  .   FINDINGS: RIGHT KIDNEY: The right kidney measures 12.9 in length. There is no hydronephrosis. The echogenicity of the renal cortex is mildly increased.   The superior pole of the kidney has a 2 cm cyst with questionable septation, unchanged.   The midportion of the kidney is a 2.2 x 2.3 x 3 cm mildly hypoechogenic mass versus focal cortical thickening.   LEFT KIDNEY: The left kidney measures 11.6  in length. No hydronephrosis is noted. The echogenicity of the cortex is mildly increased.   The superior pole of the left kidney has a 2.4 cm cyst, previously 2 cm.   BLADDER: Normal.       1. The renal cortical echogenicity is mildly increased which can be seen with medical type renal disease.   2. The mid right kidney has a 3 cm mildly hypo echogenic collection of tissue which may represent residual normal renal parenchyma; a mass is not excluded however. Enhanced MR may be  helpful for further evaluation.   3. Both kidneys have benign cysts which were also noted on the 2022 CT.   MACRO: Critical Finding:  See findings. Notification was initiated on 4/18/2024 at 10:23 am by  Joseph Sawyer.  (**-YCF-**)   Signed by: Joseph Sawyer 4/18/2024 10:23 AM Dictation workstation:   [id number]    XR chest 2 views    Result Date: 4/18/2024  Interpreted By:  Dario Lam, STUDY: XR CHEST 2 VIEWS; 4/17/2024 5:55 pm   INDICATION: Signs/Symptoms:Shortness of breath   COMPARISON: July 2018.   ACCESSION NUMBER(S): [id number]   ORDERING CLINICIAN: ANA BLACKBURN   TECHNIQUE: Number of films: Two-view radiographs of the chest were obtained.   FINDINGS: The cardiac silhouette appears normal. Calcifications involve the tortuous aorta. The lungs are clear. No pleural effusions are seen. The osseous structures are unchanged. Cholecystectomy clips are noted.       No acute cardiopulmonary disease.   Signed by: Dario Lam 4/18/2024 8:11 AM Dictation workstation:   [id number]                  Assessment/Plan   Principal Problem:    Cellulitis of left leg  Active Problems:    AAA (abdominal aortic aneurysm) (CMS-HCC)    Arteriosclerotic cardiovascular disease    Dehydration    Esophageal reflux    Hypertension    Pure hypercholesterolemia    Venous stasis dermatitis of both lower extremities    Venous insufficiency of both lower extremities    Stage 3a chronic kidney disease (Multi)    Ultrasound kidney results noted  Will do MRI of the kidney  Echo looks good  Cellulitis improving  Keep legs elevated for venous insufficiency  Continue antibiotic  Blood pressure stable  Kidney functions doing better       I spent  minutes in the professional and overall care of this patient.      Ana Blackburn MD

## 2024-04-18 NOTE — NURSING NOTE
BLE edematous with noted redness and weeping to LLE, patient reports mild discomfort with no intervention required, abdomen firm round and distended, call light within reach safety measures remain in place.

## 2024-04-18 NOTE — NURSING NOTE
Assumed care of patient, pt resting in bed with no needs at this time, call light and possessions within reach, bed alarm on

## 2024-04-18 NOTE — PROGRESS NOTES
Physical Therapy    Physical Therapy Evaluation    Patient Name: Charles Chan  MRN: [id number]  Today's Date: 4/18/2024   Time Calculation  Start Time: 0742  Stop Time: 0801  Time Calculation (min): 19 min  Documentation and services provided by student physical therapist while supervised under a licensed physical therapist.   Ej Gonzalez, SPT      Assessment/Plan   PT Assessment  Medical Staff Made Aware: Yes  End of Session Communication: Bedside nurse  End of Session Patient Position: Up in chair, Alarm off, not on at start of session    Assessment Comment: 79 y/o male presents to hospital for LLE cellulitis and infection. Pt on PIV for antibiotics. Pt had no evidence of LOB or unsteadiness and was not limited by physical deficits when participating in functional mobility. No skilled PT needs required while in house. Pt voices no concerns with home going environment.    IP OR SWING BED PT PLAN  Inpatient or Swing Bed: Inpatient  PT Plan  PT Plan: PT Eval only  PT Eval Only Reason: No acute PT needs identified  PT Frequency: PT eval only  PT Discharge Recommendations: No further acute PT  PT Recommended Transfer Status: Independent  PT - OK to Discharge: Yes      Subjective   General Visit Information:  General  Reason for Referral: Impaired mobility  Past Medical History Relevant to Rehab: PMH to include but not limited to CAD, HTN, HLD, CKD, AAA  Missed Visit: No  Family/Caregiver Present: No  Prior to Session Communication: Bedside nurse  Patient Position Received: Bed, 3 rail up, Alarm off, not on at start of session  Preferred Learning Style: verbal  General Comment: Pt presents supine in bed. Pleasant and agreeable to participate in therapy session.  Home Living:  Home Living  Type of Home: House  Lives With: Spouse, Grandchildren (wife, grandson, grandson's wife, and great grand kids.)  Home Adaptive Equipment: None  Home Layout: Multi-level  Home Access: Stairs to enter with rails  Entrance Stairs-Rails:  "Right  Entrance Stairs-Number of Steps: x4  Home Living Comments: Pt reports no concerns with home environment. Pt has first floor set-up and has no concerns with negotiating additional steps in home environment.  Prior Level of Function:  Prior Function Per Pt/Caregiver Report  Level of [place]: Independent with ADLs and functional transfers, Independent with homemaking with ambulation  ADL Assistance: Independent  Homemaking Assistance: Independent  Ambulatory Assistance: Independent  Prior Function Comments: Drives independently with no issues prior to admission. Pt plans to return to driving \"for the [facility].\"  Precautions:     Vital Signs:  Vital Signs  Heart Rate: 57  SpO2: 97 %    Objective   Pain:  Pain Assessment  Pain Assessment: 0-10  Pain Score: 0 - No pain  Cognition:  Cognition  Overall Cognitive Status: Within Functional Limits  Orientation Level: Oriented X4    General Assessments:     Activity Tolerance  Endurance: Endurance does not limit participation in activity  Activity Tolerance Comments: Pts activity tolerance was not impacted with todays session.    Sensation  Light Touch: No apparent deficits    Strength  Strength Comments: No functional strength deficits observed with mobility  Postural Control  Postural Control: Within Functional Limits    Static Sitting Balance  Static Sitting-Balance Support: No upper extremity supported, Feet supported  Static Sitting-Level of Assistance: Modified independent  Static Sitting-Comment/Number of Minutes: sitting EOB and in bedside chair post session.  Dynamic Sitting Balance  Dynamic Sitting-Balance Support: No upper extremity supported, Feet supported  Dynamic Sitting-Comments: leaning and scooting from seated position.    Static Standing Balance  Static Standing-Balance Support: No upper extremity supported  Static Standing-Level of Assistance: Modified independent  Dynamic Standing Balance  Dynamic Standing-Balance Support: No upper extremity " supported  Dynamic Standing-Comments: at Dist sup for ambulation and turns with no signs of unsteadiness or LOB.  Functional Assessments:  Bed Mobility  Bed Mobility: Yes  Bed Mobility 1  Bed Mobility 1: Supine to sitting  Level of Assistance 1: Modified independent  Bed Mobility Comments 1: HOB elevated    Transfers  Transfer: Yes  Transfer 1  Transfer From 1: Sit to  Transfer to 1: Stand  Transfer Level of Assistance 1: Distant supervision  Trials/Comments 1: mult transfers with good safety awareness and hand placement without need for A.D  Transfers 2  Transfer From 2: Stand to  Transfer to 2: Sit  Technique 2: Stand to sit  Transfer Level of Assistance 2: Distant supervision  Trials/Comments 2: mult transfers with good safety awareness and hand placement without need for A.D    Ambulation/Gait Training  Ambulation/Gait Training Performed: Yes  Ambulation/Gait Training 1  Surface 1: Level tile  Device 1: No device  Assistance 1: Close supervision  Comments/Distance (ft) 1: Amb approx > 200 feet without A.D or signs of LOB.    Stairs  Stairs: Yes  Stairs  Rails 1: Left  Curb Step 1: Yes  Device 1: No device  Assistance 1: Contact guard  Comment/Number of Steps 1: curb step x 4 attempts  Extremity/Trunk Assessments:  RLE   RLE : Within Functional Limits  LLE   LLE : Within Functional Limits  Outcome Measures:  [facility] Basic Mobility  Turning from your back to your side while in a flat bed without using bedrails: None  Moving from lying on your back to sitting on the side of a flat bed without using bedrails: None  Moving to and from bed to chair (including a wheelchair): None  Standing up from a chair using your arms (e.g. wheelchair or bedside chair): None  To walk in hospital room: None  Climbing 3-5 steps with railing: A little  Basic Mobility - Total Score: 23    Education Documentation  Precautions, taught by [place] Olson at 4/18/2024  9:36 AM.  Learner: Patient  Readiness: Acceptance  Method:  Explanation  Response: Verbalizes Understanding    Body Mechanics, taught by IND[place]NA Olson at 4/18/2024  9:36 AM.  Learner: Patient  Readiness: Acceptance  Method: Explanation  Response: Verbalizes Understanding    Mobility Training, taught by IND[place]NA Olson at 4/18/2024  9:36 AM.  Learner: Patient  Readiness: Acceptance  Method: Explanation  Response: Verbalizes Understanding    Education Comments  No comments found.

## 2024-04-18 NOTE — PROGRESS NOTES
"Evaluation    Patient Name: Charles Chan  MRN: [id number]  Today's Date: 4/18/2024  Time Calculation  Start Time: 0829  Stop Time: 0845  Time Calculation (min): 16 min    Assessment  IP OT Assessment  OT Assessment: 79 yo male with ongoing history of LE cellulitis presents with pain, redness, swelling in left LE.  Pt reports significant symptom improvement since admission and demonstrates no functional deficits at this time.  Will sign off.  End of Session Communication: Bedside nurse  End of Session Patient Position: Up in chair, Alarm off, not on at start of session    Plan:  No Skilled OT: No acute OT goals identified  OT Frequency: OT eval only  OT Discharge Recommendations: No further acute OT  OT - OK to Discharge: Yes    Subjective   Current Problem:  1. Cellulitis of left leg        2. Arteriosclerotic cardiovascular disease  Transthoracic Echo (TTE) Complete    Transthoracic Echo (TTE) Complete        General:  General  Reason for Referral: impaired ADLs  Past Medical History Relevant to Rehab:  (CAD, cardiac stents x 2, CKD, HTN, HLD, colectomy d/t ruptured diverticular abscess, obesity, cellulitis)  Family/Caregiver Present: No  Prior to Session Communication: Bedside nurse  Patient Position Received:  (returning from bathroom)  Preferred Learning Style: verbal, visual  General Comment: 79yo male with left LE cellulitis was cleared by nursing for therapy and agreeable to same.    Precautions:  Medical Precautions: Fall precautions    Pain:  Pain Assessment  Pain Assessment: 0-10  Pain Score:  (\"not really\" when questioned)        Objective     Cognition:  Overall Cognitive Status: Within Functional Limits  Orientation Level: Oriented X4    Home Living:  Type of Home: House  Lives With: Spouse, Grandchildren  Home Adaptive Equipment: Walker rolling or standard (walker that belonged to mother in storage)  Home Layout: Multi-level, Able to live on main level with bedroom/bathroom (does not go down to " basement)  Home Access: Stairs to enter with rails  Entrance Stairs-Rails: Both  Entrance Stairs-Number of Steps: 4  Bathroom Shower/Tub: Tub/shower unit  Bathroom Toilet: Standard  Bathroom Equipment: Grab bars in shower (shower chair in storage if needed)     Prior Function:  Level of [place]: Independent with ADLs and functional transfers, Independent with homemaking with ambulation  Receives Help From: Family (Shares IADLs with spouse and grandson)  ADL Assistance: Independent  Homemaking Assistance: Independent  Ambulatory Assistance: Independent  Vocational: Retired (Supervisor)  Prior Function Comments:  (+drives)    ADL:  Eating Assistance: Independent  Grooming Assistance: Independent  Bathing Assistance: Independent  UE Dressing Assistance: Independent  LE Dressing Assistance: Independent  Toileting Assistance with Device: Independent  ADL Comments: demonstrated ability to reach feet for independent lower body dressing and bathing; up ad claribel in bathroom; remainder of ADLs = per clinical judgement    Activity Tolerance:  Endurance: Endurance does not limit participation in activity    Bed Mobility/Transfers: Transfers  Transfer: Yes  Transfer 1  Transfer From 1: Stand to  Transfer to 1: Sit, Chair with arms  Technique 1: Stand to sit  Transfer Level of Assistance 1: Independent    Functional Mobility:  Functional Mobility  Functional Mobility Performed: Yes (Independent bath to bedroom pushing IV pole)    Sensation:  Light Touch: No apparent deficits  Sensation Comment: Denies tingling/numbness    Strength:  Strength Comments: BUEs = ~4+/5 grossly    Hand Function:  Hand Function  Gross Grasp: Functional  Coordination: Functional    Extremities: RUE   RUE : Within Functional Limits and LUE   LUE: Within Functional Limits    Outcome Measures: [facility] Daily Activity  Putting on and taking off regular lower body clothing: None  Bathing (including washing, rinsing, drying): None  Putting on and taking off  regular upper body clothing: None  Toileting, which includes using toilet, bedpan or urinal: None  Taking care of personal grooming such as brushing teeth: None  Eating Meals: None  Daily Activity - Total Score: 24

## 2024-04-18 NOTE — PROGRESS NOTES
04/18/24 1406   Discharge Planning   Living Arrangements Spouse/significant other   Support Systems Spouse/significant other;Family members;Children   Assistance Needed independent, works and drives   Type of Residence Private residence   Patient expects to be discharged to: Home with no needs   Does the patient need discharge transport arranged? No     Patient seen in room with legs elevated, states he is feeling better and legs have improved since admission.   PLAN: Discharge to home with no needs.

## 2024-04-19 LAB
ANION GAP SERPL CALC-SCNC: 7 MMOL/L
BUN SERPL-MCNC: 21 MG/DL (ref 8–25)
CALCIUM SERPL-MCNC: 8.9 MG/DL (ref 8.5–10.4)
CHLORIDE SERPL-SCNC: 107 MMOL/L (ref 97–107)
CO2 SERPL-SCNC: 24 MMOL/L (ref 24–31)
CREAT SERPL-MCNC: 1.9 MG/DL (ref 0.4–1.6)
EGFRCR SERPLBLD CKD-EPI 2021: 35 ML/MIN/1.73M*2
ERYTHROCYTE [DISTWIDTH] IN BLOOD BY AUTOMATED COUNT: 12.2 % (ref 11.5–14.5)
GLUCOSE SERPL-MCNC: 93 MG/DL (ref 65–99)
HCT VFR BLD AUTO: 38.3 % (ref 41–52)
HGB BLD-MCNC: 12.8 G/DL (ref 13.5–17.5)
MCH RBC QN AUTO: 29 PG (ref 26–34)
MCHC RBC AUTO-ENTMCNC: 33.4 G/DL (ref 32–36)
MCV RBC AUTO: 87 FL (ref 80–100)
NRBC BLD-RTO: 0 /100 WBCS (ref 0–0)
PLATELET # BLD AUTO: 231 X10*3/UL (ref 150–450)
POTASSIUM SERPL-SCNC: 4.6 MMOL/L (ref 3.4–5.1)
RBC # BLD AUTO: 4.41 X10*6/UL (ref 4.5–5.9)
SODIUM SERPL-SCNC: 138 MMOL/L (ref 133–145)
WBC # BLD AUTO: 7.4 X10*3/UL (ref 4.4–11.3)

## 2024-04-19 PROCEDURE — 85027 COMPLETE CBC AUTOMATED: CPT | Performed by: INTERNAL MEDICINE

## 2024-04-19 PROCEDURE — 36415 COLL VENOUS BLD VENIPUNCTURE: CPT | Performed by: INTERNAL MEDICINE

## 2024-04-19 PROCEDURE — 2500000004 HC RX 250 GENERAL PHARMACY W/ HCPCS (ALT 636 FOR OP/ED): Performed by: INTERNAL MEDICINE

## 2024-04-19 PROCEDURE — 2500000006 HC RX 250 W HCPCS SELF ADMINISTERED DRUGS (ALT 637 FOR ALL PAYERS): Mod: MUE | Performed by: INTERNAL MEDICINE

## 2024-04-19 PROCEDURE — 80048 BASIC METABOLIC PNL TOTAL CA: CPT | Performed by: INTERNAL MEDICINE

## 2024-04-19 PROCEDURE — 1100000001 HC PRIVATE ROOM DAILY

## 2024-04-19 PROCEDURE — 2500000001 HC RX 250 WO HCPCS SELF ADMINISTERED DRUGS (ALT 637 FOR MEDICARE OP): Performed by: INTERNAL MEDICINE

## 2024-04-19 PROCEDURE — 99233 SBSQ HOSP IP/OBS HIGH 50: CPT | Performed by: INTERNAL MEDICINE

## 2024-04-19 RX ORDER — VANCOMYCIN 1.75 G/350ML
1250 INJECTION, SOLUTION INTRAVENOUS EVERY 24 HOURS
Status: DISCONTINUED | OUTPATIENT
Start: 2024-04-19 | End: 2024-04-21

## 2024-04-19 RX ORDER — FUROSEMIDE 10 MG/ML
20 INJECTION INTRAMUSCULAR; INTRAVENOUS ONCE
Status: COMPLETED | OUTPATIENT
Start: 2024-04-19 | End: 2024-04-19

## 2024-04-19 RX ORDER — VANCOMYCIN HYDROCHLORIDE 1 G/20ML
INJECTION, POWDER, LYOPHILIZED, FOR SOLUTION INTRAVENOUS DAILY PRN
Status: DISCONTINUED | OUTPATIENT
Start: 2024-04-19 | End: 2024-04-22 | Stop reason: HOSPADM

## 2024-04-19 RX ADMIN — FINASTERIDE 5 MG: 5 TABLET, FILM COATED ORAL at 09:08

## 2024-04-19 RX ADMIN — METOPROLOL SUCCINATE 25 MG: 25 TABLET, FILM COATED, EXTENDED RELEASE ORAL at 09:08

## 2024-04-19 RX ADMIN — ROSUVASTATIN CALCIUM 40 MG: 20 TABLET, COATED ORAL at 09:09

## 2024-04-19 RX ADMIN — CLOPIDOGREL BISULFATE 75 MG: 75 TABLET ORAL at 09:08

## 2024-04-19 RX ADMIN — ENOXAPARIN SODIUM 40 MG: 40 INJECTION SUBCUTANEOUS at 17:42

## 2024-04-19 RX ADMIN — VANCOMYCIN 1250 MG: 1.75 INJECTION, SOLUTION INTRAVENOUS at 22:21

## 2024-04-19 RX ADMIN — DOCUSATE SODIUM 100 MG: 100 CAPSULE, LIQUID FILLED ORAL at 20:04

## 2024-04-19 RX ADMIN — CEFTRIAXONE SODIUM 1 G: 1 INJECTION, SOLUTION INTRAVENOUS at 17:42

## 2024-04-19 RX ADMIN — ASPIRIN 325 MG ORAL TABLET 325 MG: 325 PILL ORAL at 09:00

## 2024-04-19 RX ADMIN — PANTOPRAZOLE SODIUM 40 MG: 40 TABLET, DELAYED RELEASE ORAL at 06:25

## 2024-04-19 RX ADMIN — FUROSEMIDE 20 MG: 10 INJECTION, SOLUTION INTRAMUSCULAR; INTRAVENOUS at 22:21

## 2024-04-19 RX ADMIN — AMLODIPINE BESYLATE 10 MG: 10 TABLET ORAL at 09:08

## 2024-04-19 RX ADMIN — ALFUZOSIN HYDROCHLORIDE 10 MG: 10 TABLET, EXTENDED RELEASE ORAL at 09:08

## 2024-04-19 RX ADMIN — POTASSIUM CHLORIDE 20 MEQ: 1500 TABLET, EXTENDED RELEASE ORAL at 09:08

## 2024-04-19 ASSESSMENT — COGNITIVE AND FUNCTIONAL STATUS - GENERAL
MOBILITY SCORE: 24
DAILY ACTIVITIY SCORE: 24

## 2024-04-19 ASSESSMENT — PAIN SCALES - GENERAL: PAINLEVEL_OUTOF10: 0 - NO PAIN

## 2024-04-19 ASSESSMENT — PAIN - FUNCTIONAL ASSESSMENT: PAIN_FUNCTIONAL_ASSESSMENT: 0-10

## 2024-04-19 NOTE — PROGRESS NOTES
04/19/24 1429   Discharge Planning   Living Arrangements Spouse/significant other   Support Systems Spouse/significant other;Family members;Children   Assistance Needed Independent with care   Type of Residence Private residence   Home or Post Acute Services None   Patient expects to be discharged to: Home with no needs   Does the patient need discharge transport arranged? No     Per MD note: Cellulitis improving, encourage to keep legs elevated for venous insufficiency and continue antibiotic. Blood pressure stable and kidney functions doing better. Pt reports no concerns with home environment. Pt has first floor set-up and has no concerns with negotiating additional steps in home environment.     PLAN: Discharge to home with no needs.

## 2024-04-19 NOTE — CARE PLAN
Problem: Pain  Goal: Takes deep breaths with improved pain control throughout the shift  Outcome: Progressing  Goal: Turns in bed with improved pain control throughout the shift  Outcome: Progressing  Goal: Walks with improved pain control throughout the shift  Outcome: Progressing  Goal: Performs ADL's with improved pain control throughout shift  Outcome: Progressing  Goal: Free from opioid side effects throughout the shift  Outcome: Progressing  Goal: Free from acute confusion related to pain meds throughout the shift  Outcome: Progressing     Problem: Pain  Goal: My pain/discomfort is manageable  Outcome: Progressing     Problem: Safety  Goal: Patient will be injury free during hospitalization  Outcome: Progressing  Goal: I will remain free of falls  Outcome: Progressing     Problem: Daily Care  Goal: Daily care needs are met  Outcome: Progressing     Problem: Psychosocial Needs  Goal: Demonstrates ability to cope with hospitalization/illness  Outcome: Progressing  Goal: Collaborate with me, my family, and caregiver to identify my specific goals  Outcome: Progressing     Problem: Discharge Barriers  Goal: My discharge needs are met  Outcome: Progressing     Problem: Skin  Goal: Decreased wound size/increased tissue granulation at next dressing change  Outcome: Progressing  Goal: Participates in plan/prevention/treatment measures  Outcome: Progressing  Goal: Promote/optimize nutrition  Outcome: Progressing  Goal: Promote skin healing  Outcome: Progressing   The patient's goals for the shift include      The clinical goals for the shift include saftey, comfort, mri, iv abx    Over the shift, the patient did not make progress toward the following goals. Barriers to progression include weakness, infection. Recommendations to address these barriers include safe ambulation, iv abx.

## 2024-04-20 ENCOUNTER — APPOINTMENT (OUTPATIENT)
Dept: RADIOLOGY | Facility: HOSPITAL | Age: 79
DRG: 603 | End: 2024-04-20
Payer: MEDICARE

## 2024-04-20 LAB
ANION GAP SERPL CALC-SCNC: 8 MMOL/L
BUN SERPL-MCNC: 22 MG/DL (ref 8–25)
CALCIUM SERPL-MCNC: 8.4 MG/DL (ref 8.5–10.4)
CHLORIDE SERPL-SCNC: 108 MMOL/L (ref 97–107)
CO2 SERPL-SCNC: 22 MMOL/L (ref 24–31)
CREAT SERPL-MCNC: 2 MG/DL (ref 0.4–1.6)
EGFRCR SERPLBLD CKD-EPI 2021: 33 ML/MIN/1.73M*2
ERYTHROCYTE [DISTWIDTH] IN BLOOD BY AUTOMATED COUNT: 12.1 % (ref 11.5–14.5)
GLUCOSE SERPL-MCNC: 101 MG/DL (ref 65–99)
HCT VFR BLD AUTO: 37.6 % (ref 41–52)
HGB BLD-MCNC: 12.5 G/DL (ref 13.5–17.5)
MCH RBC QN AUTO: 28.9 PG (ref 26–34)
MCHC RBC AUTO-ENTMCNC: 33.2 G/DL (ref 32–36)
MCV RBC AUTO: 87 FL (ref 80–100)
NRBC BLD-RTO: 0 /100 WBCS (ref 0–0)
PLATELET # BLD AUTO: 235 X10*3/UL (ref 150–450)
POTASSIUM SERPL-SCNC: 4.5 MMOL/L (ref 3.4–5.1)
RBC # BLD AUTO: 4.32 X10*6/UL (ref 4.5–5.9)
SODIUM SERPL-SCNC: 138 MMOL/L (ref 133–145)
VANCOMYCIN SERPL-MCNC: 12.3 UG/ML (ref 10–20)
WBC # BLD AUTO: 7.9 X10*3/UL (ref 4.4–11.3)

## 2024-04-20 PROCEDURE — 85027 COMPLETE CBC AUTOMATED: CPT | Performed by: INTERNAL MEDICINE

## 2024-04-20 PROCEDURE — 80048 BASIC METABOLIC PNL TOTAL CA: CPT | Performed by: INTERNAL MEDICINE

## 2024-04-20 PROCEDURE — 1100000001 HC PRIVATE ROOM DAILY

## 2024-04-20 PROCEDURE — 36415 COLL VENOUS BLD VENIPUNCTURE: CPT | Performed by: INTERNAL MEDICINE

## 2024-04-20 PROCEDURE — 93971 EXTREMITY STUDY: CPT | Performed by: RADIOLOGY

## 2024-04-20 PROCEDURE — 2500000001 HC RX 250 WO HCPCS SELF ADMINISTERED DRUGS (ALT 637 FOR MEDICARE OP): Performed by: INTERNAL MEDICINE

## 2024-04-20 PROCEDURE — 2500000006 HC RX 250 W HCPCS SELF ADMINISTERED DRUGS (ALT 637 FOR ALL PAYERS): Performed by: INTERNAL MEDICINE

## 2024-04-20 PROCEDURE — 80202 ASSAY OF VANCOMYCIN: CPT | Performed by: INTERNAL MEDICINE

## 2024-04-20 PROCEDURE — 99232 SBSQ HOSP IP/OBS MODERATE 35: CPT | Performed by: INTERNAL MEDICINE

## 2024-04-20 PROCEDURE — 93971 EXTREMITY STUDY: CPT

## 2024-04-20 PROCEDURE — 2500000004 HC RX 250 GENERAL PHARMACY W/ HCPCS (ALT 636 FOR OP/ED): Mod: JZ | Performed by: INTERNAL MEDICINE

## 2024-04-20 RX ORDER — TRIAMCINOLONE ACETONIDE 1 MG/G
CREAM TOPICAL 2 TIMES DAILY
Status: DISCONTINUED | OUTPATIENT
Start: 2024-04-20 | End: 2024-04-22 | Stop reason: HOSPADM

## 2024-04-20 RX ADMIN — AMLODIPINE BESYLATE 10 MG: 10 TABLET ORAL at 09:02

## 2024-04-20 RX ADMIN — ENOXAPARIN SODIUM 40 MG: 40 INJECTION SUBCUTANEOUS at 17:11

## 2024-04-20 RX ADMIN — ROSUVASTATIN CALCIUM 40 MG: 20 TABLET, COATED ORAL at 21:22

## 2024-04-20 RX ADMIN — ALFUZOSIN HYDROCHLORIDE 10 MG: 10 TABLET, EXTENDED RELEASE ORAL at 09:02

## 2024-04-20 RX ADMIN — CEFTRIAXONE SODIUM 1 G: 1 INJECTION, SOLUTION INTRAVENOUS at 17:11

## 2024-04-20 RX ADMIN — CLOPIDOGREL BISULFATE 75 MG: 75 TABLET ORAL at 16:19

## 2024-04-20 RX ADMIN — TRIAMCINOLONE ACETONIDE: 1 CREAM TOPICAL at 21:23

## 2024-04-20 RX ADMIN — METOPROLOL SUCCINATE 25 MG: 25 TABLET, FILM COATED, EXTENDED RELEASE ORAL at 09:02

## 2024-04-20 RX ADMIN — DOCUSATE SODIUM 100 MG: 100 CAPSULE, LIQUID FILLED ORAL at 21:22

## 2024-04-20 RX ADMIN — ASPIRIN 325 MG ORAL TABLET 325 MG: 325 PILL ORAL at 16:19

## 2024-04-20 RX ADMIN — VANCOMYCIN 1250 MG: 1.75 INJECTION, SOLUTION INTRAVENOUS at 22:50

## 2024-04-20 RX ADMIN — FINASTERIDE 5 MG: 5 TABLET, FILM COATED ORAL at 09:02

## 2024-04-20 RX ADMIN — PANTOPRAZOLE SODIUM 40 MG: 40 TABLET, DELAYED RELEASE ORAL at 05:57

## 2024-04-20 ASSESSMENT — COGNITIVE AND FUNCTIONAL STATUS - GENERAL
MOBILITY SCORE: 24
DAILY ACTIVITIY SCORE: 24

## 2024-04-20 ASSESSMENT — PAIN - FUNCTIONAL ASSESSMENT
PAIN_FUNCTIONAL_ASSESSMENT: 0-10

## 2024-04-20 ASSESSMENT — PAIN SCALES - GENERAL
PAINLEVEL_OUTOF10: 0 - NO PAIN

## 2024-04-20 NOTE — PROGRESS NOTES
"Vancomycin Dosing by Pharmacy- INITIAL    Charles Chan is a 78 y.o. year old male who Pharmacy has been consulted for vancomycin dosing for cellulitis, skin and soft tissue. Based on the patient's indication and renal status this patient will be dosed based on a goal AUC of 400-600.     Renal function is currently stable.    Visit Vitals  /68 (BP Location: Left arm, Patient Position: Lying)   Pulse 61   Temp 36.8 °C (98.2 °F) (Oral)   Resp 16        Lab Results   Component Value Date    CREATININE 1.90 (H) 04/19/2024    CREATININE 2.10 (H) 04/18/2024    CREATININE 2.20 (H) 04/17/2024    CREATININE 2.02 (H) 04/13/2024        Patient weight is No results found for: \"PTWEIGHT\"    No results found for: \"CULTURE\"     I/O last 3 completed shifts:  In: 560 (4.9 mL/kg) [P.O.:560]  Out: - (0 mL/kg)   Weight: 113.7 kg   [unfilled]    No results found for: \"PATIENTTEMP\"       Assessment/Plan     Patient will not be given a loading dose.  Will initiate vancomycin maintenance,  1250 mg every 24 hours.    This dosing regimen is predicted by [employer] to result in the following pharmacokinetic parameters:  <<<<<PASTE InsighLoading dose: N/A  Regimen: 1250 mg IV every 24 hours.  Start time: 21:47 on 04/19/2024  Exposure target: AUC24 (range)400-600 mg/L.hr   AUC24,ss: 530 mg/L.hr  Probability of AUC24 > 400: 80 %  Ctrough,ss: 17.7 mg/L  Probability of Ctrough,ss > 20: 37 %  Probability of nephrotoxicity (Lodise ALAYNA 2009): 14 %  tRx DATA HERE>>>>>    Follow-up level will be ordered on 4/20 at 0500 unless clinically indicated sooner.  Will continue to monitor renal function daily while on vancomycin and order serum creatinine at least every 48 hours if not already ordered.  Follow for continued vancomycin needs, clinical response, and signs/symptoms of toxicity.       Aba Flood, PharmD       "

## 2024-04-20 NOTE — NURSING NOTE
Assumed care of patient.  Patient resting in bed, alert.  BSSR completed.  Bed low and call light within reach.

## 2024-04-20 NOTE — CONSULTS
"Vancomycin Dosing by Pharmacy- FOLLOW UP    Charles Chan is a 78 y.o. year old male who Pharmacy has been consulted for vancomycin dosing for cellulitis, skin and soft tissue. Based on the patient's indication and renal status this patient is being dosed based on a goal AUC of 400-600.     Renal function is currently stable.    Current vancomycin dose: 1250 mg given every 24 hours    Estimated vancomycin AUC on current dose: 573 mg/L.hr     Visit Vitals  /74 (BP Location: Left arm, Patient Position: Lying)   Pulse 69   Temp 36.5 °C (97.7 °F) (Oral)   Resp 16        Lab Results   Component Value Date    CREATININE 2.00 (H) 04/20/2024    CREATININE 1.90 (H) 04/19/2024    CREATININE 2.10 (H) 04/18/2024    CREATININE 2.20 (H) 04/17/2024        Patient weight is No results found for: \"PTWEIGHT\"    No results found for: \"CULTURE\"     I/O last 3 completed shifts:  In: 810 (7.1 mL/kg) [P.O.:560; IV Piggyback:250]  Out: - (0 mL/kg)   Weight: 113.7 kg   [unfilled]    No results found for: \"PATIENTTEMP\"     Assessment/Plan    Within goal AUC range. Continue current vancomycin regimen.    This dosing regimen is predicted by [employer] to result in the following pharmacokinetic parameters:  <<<<<Insight[employer] DATA>>>>>  Loading dose: N/A  Regimen: 1250 mg IV every 24 hours.  Start time: 22:21 on 04/20/2024  Exposure target: AUC24 (range)400-600 mg/L.hr   AUC24,ss: 573 mg/L.hr  Probability of AUC24 > 400: 96 %  Ctrough,ss: 18.2 mg/L  Probability of Ctrough,ss > 20: 37 %  Probability of nephrotoxicity (Lodise ALAYNA 2009): 14 %    The next level will be obtained on 04/21 at 0500. May be obtained sooner if clinically indicated.   Will continue to monitor renal function daily while on vancomycin and order serum creatinine at least every 48 hours if not already ordered.  Follow for continued vancomycin needs, clinical response, and signs/symptoms of toxicity.       BUCK IVY, PharmD           "

## 2024-04-20 NOTE — PROGRESS NOTES
"Charles Chan is a 78 y.o. male on day 2 of admission presenting with Cellulitis of left leg.    Subjective   Patient feels that left leg is not looking as good as it did yesterday.  Complaining of more pain       Objective     Physical Exam  Vitals reviewed.   Constitutional:       Appearance: Normal appearance.   HENT:      Head: Normocephalic and atraumatic.      Right Ear: Tympanic membrane, ear canal and external ear normal.      Left Ear: Tympanic membrane, ear canal and external ear normal.      Nose: Nose normal.      Mouth/Throat:      Pharynx: Oropharynx is clear.   Eyes:      Extraocular Movements: Extraocular movements intact.      Conjunctiva/sclera: Conjunctivae normal.      Pupils: Pupils are equal, round, and reactive to light.   Cardiovascular:      Rate and Rhythm: Normal rate and regular rhythm.      Pulses: Normal pulses.      Heart sounds: Normal heart sounds.   Pulmonary:      Effort: Pulmonary effort is normal.      Breath sounds: Normal breath sounds.   Abdominal:      General: Abdomen is flat. Bowel sounds are normal.      Palpations: Abdomen is soft.   Musculoskeletal:         General: Tenderness present.      Cervical back: Normal range of motion and neck supple.      Right lower leg: Edema present.      Left lower leg: Edema present.   Skin:     General: Skin is warm and dry.      Findings: Rash present.   Neurological:      General: No focal deficit present.      Mental Status: He is alert and oriented to person, place, and time.   Psychiatric:         Mood and Affect: Mood normal.         Last Recorded Vitals  Blood pressure 164/68, pulse 61, temperature 36.8 °C (98.2 °F), temperature source Oral, resp. rate 16, height 1.854 m (6' 1\"), weight 114 kg (250 lb 10.6 oz), SpO2 94%.  Intake/Output last 3 Shifts:  I/O last 3 completed shifts:  In: 560 (4.9 mL/kg) [P.O.:560]  Out: - (0 mL/kg)   Weight: 113.7 kg     Relevant Results              Scheduled medications  alfuzosin, 10 mg, oral, " Daily  amLODIPine, 10 mg, oral, Daily  aspirin, 325 mg, oral, Daily  cefTRIAXone, 1 g, intravenous, q24h  clopidogrel, 75 mg, oral, Daily  docusate sodium, 100 mg, oral, BID  enoxaparin, 40 mg, subcutaneous, q24h  finasteride, 5 mg, oral, Daily  [Held by provider] furosemide, 40 mg, oral, Daily  metoprolol succinate XL, 25 mg, oral, Daily  pantoprazole, 40 mg, oral, Daily before breakfast   Or  pantoprazole, 40 mg, intravenous, Daily before breakfast  polyethylene glycol, 17 g, oral, Daily  potassium chloride CR, 20 mEq, oral, Daily  rosuvastatin, 40 mg, oral, Daily      Continuous medications     PRN medications  PRN medications: acetaminophen **OR** acetaminophen **OR** acetaminophen, ammonium lactate, benzocaine-menthol, dextromethorphan-guaifenesin, guaiFENesin, melatonin, ondansetron ODT **OR** ondansetron  Results for orders placed or performed during the hospital encounter of 04/17/24 (from the past 24 hour(s))   CBC   Result Value Ref Range    WBC 7.4 4.4 - 11.3 x10*3/uL    nRBC 0.0 0.0 - 0.0 /100 WBCs    RBC 4.41 (L) 4.50 - 5.90 x10*6/uL    Hemoglobin 12.8 (L) 13.5 - 17.5 g/dL    Hematocrit 38.3 (L) 41.0 - 52.0 %    MCV 87 80 - 100 fL    MCH 29.0 26.0 - 34.0 pg    MCHC 33.4 32.0 - 36.0 g/dL    RDW 12.2 11.5 - 14.5 %    Platelets 231 150 - 450 x10*3/uL   Basic Metabolic Panel   Result Value Ref Range    Glucose 93 65 - 99 mg/dL    Sodium 138 133 - 145 mmol/L    Potassium 4.6 3.4 - 5.1 mmol/L    Chloride 107 97 - 107 mmol/L    Bicarbonate 24 24 - 31 mmol/L    Urea Nitrogen 21 8 - 25 mg/dL    Creatinine 1.90 (H) 0.40 - 1.60 mg/dL    eGFR 35 (L) >60 mL/min/1.73m*2    Calcium 8.9 8.5 - 10.4 mg/dL    Anion Gap 7 <=19 mmol/L     MR abdomen renal w and wo IV contrast    Result Date: 4/19/2024  Interpreted By:  Blanca Hannon, STUDY: MR ABDOMEN RENAL W AND WO IV CONTRAST;  4/18/2024 10:21 pm   INDICATION: Signs/Symptoms:Abnormal ultrasound.   COMPARISON: Renal ultrasound 04/17/2024, CT abdomen and pelvis 10/31/2022    ACCESSION NUMBER(S): [id number]   ORDERING CLINICIAN: JESUSITA BLACKBURN   TECHNIQUE: MRI renal mass protocol pre and dynamically post contrast. 20 mL of Dotarem were administered intravenously without immediate complication.   FINDINGS: Kidneys: Bilateral 2 cm upper pole simple cysts. No enhancing soft tissue renal masses in either kidney. No hydronephrosis.   Liver: No mass. No hepatic steatosis.   Biliary: No intrahepatic or extrahepatic bile duct dilation. Cholecystectomy.   Spleen: No mass. No splenomegaly.   Pancreas: No mass or duct dilation.   Adrenals: Normal.   GI tract: No bowel wall thickening or dilation.   Lymph nodes: No lymphadenopathy.   Mesentery/peritoneum: No ascites or fluid collection.   Vasculature: Stable infrarenal abdominal aortic aneurysm measuring 4.2 cm containing noncalcified plaques and chronic dissection flaps.   Bones/Lower chest: No suspicious marrow replacing process.No pleural effusion.       No solid renal mass in the right kidney corresponding to prior ultrasound findings which likely represent normal renal parenchyma.   Bilateral simple renal cysts.   MACRO None   Signed by: Blanca Hannon 4/19/2024 7:13 AM Dictation workstation:   [id number]    Transthoracic Echo (TTE) Complete    Result Date: 4/18/2024            [facility] [place] [address] [id number]             Phone [phone] TRANSTHORACIC ECHOCARDIOGRAM REPORT  Patient Name:     CURTIS Peterson Physician:  [id number] Harvey Willis MD Study Date:       4/18/2024            Ordering Provider:  [id number] JESUSITA BLACKBURN MRN/PID:          [id number]             Fellow: Accession#:       [id number]         Nurse: Date of           1945 / 78 years  Sonographer:        Teresa Gross RDCS Birth/Age: Gender:           M                    Additional Staff:   Tamera Beckman Student Height:           182.88 cm            Admit Date: Weight:            113.40 kg            Admission Status:   Inpatient - Routine BSA / BMI:        2.34 m2 / 33.91      Department          [facility]                   kg/m2                Location: Blood Pressure: 166 /72 mmHg Study Type:    TRANSTHORACIC ECHO (TTE) COMPLETE Diagnosis/ICD: Atherosclerotic heart disease of native coronary artery without                angina pectoris-I25.10 Indication:    CAD CPT Codes:     Echo Complete w Full Doppler-[id number] Patient History: Pertinent History: HTN. AAA, carotid stenosis, hypercholesterolemia, CP, SOB. Study Detail: The following Echo studies were performed: 2D, M-Mode, Doppler and               color flow.  PHYSICIAN INTERPRETATION: Left Ventricle: Left ventricular systolic function is normal, with an estimated ejection fraction of 60-65%. There are no regional wall motion abnormalities. The left ventricular cavity size is normal. Spectral Doppler shows a normal pattern of left ventricular diastolic filling. Left Atrium: The left atrium is mildly dilated. Right Ventricle: The right ventricle is normal in size. There is normal right ventriclar wall thickness. There is normal right ventricular global systolic function. Right Atrium: The right atrium is normal in size. Aortic Valve: The aortic valve appears structurally normal. The aortic valve appears tricuspid and non-restricted. There is trivial aortic valve regurgitation. The peak instantaneous gradient of the aortic valve is 7.2 mmHg. The mean gradient of the aortic valve is 3.0 mmHg. Mitral Valve: The mitral valve is normal in structure. There is normal mitral valve leaflet mobility. There is trace mitral valve regurgitation. Tricuspid Valve: The tricuspid valve is structurally normal. There is normal tricuspid valve leaflet mobility. There is mild tricuspid regurgitation. The Doppler estimated RVSP is slightly elevated at 36.4 mmHg. Pulmonic Valve: The pulmonic valve is structurally normal. There is trace pulmonic valve  regurgitation. Pericardium: There is no pericardial effusion noted. Aorta: The aortic root is normal. There is no dilatation of the aortic arch. There is mild dilatation of the ascending aorta. There is no dilatation of the aortic root. Pulmonary Artery: The pulmonary artery is normal in size. The tricuspid regurgitant velocity is 2.89 m/s, and with an estimated right atrial pressure of 3 mmHg, the estimated pulmonary artery pressure is borderline elevated with the RVSP at 36.4 mmHg. The estimated PASP is 36 mmHg. Systemic Veins: The inferior vena cava appears to be of normal size. There is IVC inspiratory collapse greater than 50%.  CONCLUSIONS:  1. Left ventricular systolic function is normal with a 60-65% estimated ejection fraction.  2. Mild tricuspid regurgitation is visualized.  3. Slightly elevated RVSP.  4. The estimated PASP is 36 mmHg. QUANTITATIVE DATA SUMMARY: 2D MEASUREMENTS:                          Normal Ranges: IVSd:          1.04 cm   (0.6-1.1cm) LVPWd:         1.20 cm   (0.6-1.1cm) LVIDd:         4.03 cm   (3.9-5.9cm) LVIDs:         2.83 cm LV Mass Index: 64.6 g/m2 LV % FS        29.8 % LA VOLUME:                               Normal Ranges: LA Vol A4C:        40.4 ml    (22+/-6mL/m2) LA Vol A2C:        71.2 ml LA Vol BP:         58.8 ml LA Vol Index A4C:  17.3ml/m2 LA Vol Index A2C:  30.4 ml/m2 LA Vol Index BP:   25.1 ml/m2 LA Area A4C:       15.2 cm2 LA Area A2C:       22.1 cm2 LA Major Axis A4C: 4.9 cm LA Major Axis A2C: 5.8 cm LA Volume Index:   23.9 ml/m2 LA Vol A4C:        38.6 ml LA Vol A2C:        67.8 ml RA VOLUME BY A/L METHOD:                               Normal Ranges: RA Vol A4C:        29.2 ml    (8.3-19.5ml) RA Vol Index A4C:  12.5 ml/m2 RA Area A4C:       13.2 cm2 RA Major Axis A4C: 5.1 cm M-MODE MEASUREMENTS:                  Normal Ranges: Ao Root: 2.80 cm (2.0-3.7cm) AORTA MEASUREMENTS:                      Normal Ranges: Ao Sinus, d: 3.25 cm (2.1-3.5cm) Asc Ao, d:   3.80 cm  (2.1-3.4cm) LV SYSTOLIC FUNCTION BY 2D PLANIMETRY (MOD):                     Normal Ranges: EF-A4C View: 55.6 % (>=55%) LV DIASTOLIC FUNCTION:                        Normal Ranges: MV Peak E:    0.97 m/s (0.7-1.2 m/s) MV Peak A:    1.07 m/s (0.42-0.7 m/s) E/A Ratio:    0.90     (1.0-2.2) MV e'         0.06 m/s (>8.0) MV lateral e' 0.07 m/s MV medial e'  0.06 m/s E/e' Ratio:   14.89    (<8.0) MITRAL VALVE:                 Normal Ranges: MV DT: 213 msec (150-240msec) AORTIC VALVE:                                   Normal Ranges: AoV Vmax:                1.34 m/s (<=1.7m/s) AoV Peak P.2 mmHg (<20mmHg) AoV Mean PG:             3.0 mmHg (1.7-11.5mmHg) LVOT Max Christofer:            1.08 m/s (<=1.1m/s) AoV VTI:                 30.00 cm (18-25cm) LVOT VTI:                25.30 cm LVOT Diameter:           2.10 cm  (1.8-2.4cm) AoV Area, VTI:           2.92 cm2 (2.5-5.5cm2) AoV Area,Vmax:           2.79 cm2 (2.5-4.5cm2) AoV Dimensionless Index: 0.84  RIGHT VENTRICLE: RV Basal 3.56 cm RV Mid   2.41 cm RV Major 7.0 cm TAPSE:   28.1 mm RV s'    0.14 m/s TRICUSPID VALVE/RVSP:                             Normal Ranges: Peak TR Velocity: 2.89 m/s RV Syst Pressure: 36.4 mmHg (< 30mmHg) IVC Diam:         1.82 cm PULMONIC VALVE:                         Normal Ranges: PV Accel Time: 74 msec  (>120ms) PV Max Christofer:    1.1 m/s  (0.6-0.9m/s) PV Max P.5 mmHg  [id number] Harvey Willis MD Electronically signed on 2024 at 12:53:16 PM  ** Final **                   Assessment/Plan   Principal Problem:    Cellulitis of left leg  Active Problems:    AAA (abdominal aortic aneurysm) (CMS-HCC)    Arteriosclerotic cardiovascular disease    Dehydration    Esophageal reflux    Hypertension    Pure hypercholesterolemia    Venous stasis dermatitis of both lower extremities    Venous insufficiency of both lower extremities    Stage 3a chronic kidney disease (Multi)    Will add vancomycin  Ultrasound of the left leg  MRI of the kidneys got  no mass  Echo shows normal ejection fraction  Kidney function improving  Will give small dose of Lasix 20 mg  Encourage patient to keep the legs elevated       I spent  minutes in the professional and overall care of this patient.      Ana Pryor MD

## 2024-04-20 NOTE — PROGRESS NOTES
Pharmacy to Dose Vancomycin - Initial Consult    Charles Chan is a 78 y.o. male admitted for Cellulitis of left leg.    Pharmacy has been consulted for vancomycin dosing and monitoring for complicated skin and soft tissue infection.     Assessment  Vital signs reviewed, including temperature, HR, BP, I/Os.   Available lab results reviewed, including:WBC, serum creatinine, and BUN.  Baseline risks associated with therapy include: pre-existing renal impairment.           Plan     Orders placed:scheduled dosing (no loading dose).  Await culture results for growth and sensitivity.  Daily serum creatinine while receiving vancomycin.   Follow for continued vancomycin need, clinical response, and s/s of toxicity.   Expected vancomycin duration: 7 days of total therapy, to be completed on 4/26.  Will consider vancomycin trough level before 5th dose only if therapy expected to continue or if significant renal function change.     Aba Flood, PharmD

## 2024-04-20 NOTE — PROGRESS NOTES
"Charles Chan is a 78 y.o. male on day 3 of admission presenting with Cellulitis of left leg.    Subjective   Patient's left leg is much better redness swelling has gone down       Objective     Physical Exam  Vitals reviewed.   Constitutional:       Appearance: Normal appearance.   HENT:      Head: Normocephalic and atraumatic.      Right Ear: Tympanic membrane, ear canal and external ear normal.      Left Ear: Tympanic membrane, ear canal and external ear normal.      Nose: Nose normal.      Mouth/Throat:      Pharynx: Oropharynx is clear.   Eyes:      Extraocular Movements: Extraocular movements intact.      Conjunctiva/sclera: Conjunctivae normal.      Pupils: Pupils are equal, round, and reactive to light.   Cardiovascular:      Rate and Rhythm: Normal rate and regular rhythm.      Pulses: Normal pulses.      Heart sounds: Normal heart sounds.   Pulmonary:      Effort: Pulmonary effort is normal.      Breath sounds: Normal breath sounds.   Abdominal:      General: Abdomen is flat. Bowel sounds are normal.      Palpations: Abdomen is soft.   Musculoskeletal:      Cervical back: Normal range of motion and neck supple.      Right lower leg: Edema present.      Left lower leg: Edema present.   Skin:     General: Skin is warm and dry.      Findings: Erythema and rash present.   Neurological:      General: No focal deficit present.      Mental Status: He is alert and oriented to person, place, and time.   Psychiatric:         Mood and Affect: Mood normal.         Last Recorded Vitals  Blood pressure 144/81, pulse 97, temperature 36.8 °C (98.2 °F), temperature source Oral, resp. rate 18, height 1.854 m (6' 1\"), weight 114 kg (250 lb 10.6 oz), SpO2 94%.  Intake/Output last 3 Shifts:  I/O last 3 completed shifts:  In: 890 (7.8 mL/kg) [P.O.:640; IV Piggyback:250]  Out: - (0 mL/kg)   Weight: 113.7 kg     Relevant Results                Scheduled medications  alfuzosin, 10 mg, oral, Daily  amLODIPine, 10 mg, oral, " Daily  aspirin, 325 mg, oral, Daily  cefTRIAXone, 1 g, intravenous, q24h  clopidogrel, 75 mg, oral, Daily  docusate sodium, 100 mg, oral, BID  enoxaparin, 40 mg, subcutaneous, q24h  finasteride, 5 mg, oral, Daily  [Held by provider] furosemide, 40 mg, oral, Daily  metoprolol succinate XL, 25 mg, oral, Daily  pantoprazole, 40 mg, oral, Daily before breakfast   Or  pantoprazole, 40 mg, intravenous, Daily before breakfast  polyethylene glycol, 17 g, oral, Daily  potassium chloride CR, 20 mEq, oral, Daily  rosuvastatin, 40 mg, oral, Daily  triamcinolone, , Topical, BID  vancomycin-diluent combo no.1, 1,250 mg, intravenous, q24h      Continuous medications     PRN medications  PRN medications: acetaminophen **OR** acetaminophen **OR** acetaminophen, ammonium lactate, benzocaine-menthol, dextromethorphan-guaifenesin, guaiFENesin, melatonin, ondansetron ODT **OR** ondansetron, vancomycin  Results for orders placed or performed during the hospital encounter of 04/17/24 (from the past 24 hour(s))   CBC   Result Value Ref Range    WBC 7.9 4.4 - 11.3 x10*3/uL    nRBC 0.0 0.0 - 0.0 /100 WBCs    RBC 4.32 (L) 4.50 - 5.90 x10*6/uL    Hemoglobin 12.5 (L) 13.5 - 17.5 g/dL    Hematocrit 37.6 (L) 41.0 - 52.0 %    MCV 87 80 - 100 fL    MCH 28.9 26.0 - 34.0 pg    MCHC 33.2 32.0 - 36.0 g/dL    RDW 12.1 11.5 - 14.5 %    Platelets 235 150 - 450 x10*3/uL   Basic Metabolic Panel   Result Value Ref Range    Glucose 101 (H) 65 - 99 mg/dL    Sodium 138 133 - 145 mmol/L    Potassium 4.5 3.4 - 5.1 mmol/L    Chloride 108 (H) 97 - 107 mmol/L    Bicarbonate 22 (L) 24 - 31 mmol/L    Urea Nitrogen 22 8 - 25 mg/dL    Creatinine 2.00 (H) 0.40 - 1.60 mg/dL    eGFR 33 (L) >60 mL/min/1.73m*2    Calcium 8.4 (L) 8.5 - 10.4 mg/dL    Anion Gap 8 <=19 mmol/L   Vancomycin   Result Value Ref Range    Vancomycin 12.3 10.0 - 20.0 ug/mL   Lower extremity venous duplex left   Result Value Ref Range    BSA 2.42 m2     MR abdomen renal w and wo IV contrast    Result  Date: 4/19/2024  Interpreted By:  Blanca Hannon, STUDY: MR ABDOMEN RENAL W AND WO IV CONTRAST;  4/18/2024 10:21 pm   INDICATION: Signs/Symptoms:Abnormal ultrasound.   COMPARISON: Renal ultrasound 04/17/2024, CT abdomen and pelvis 10/31/2022   ACCESSION NUMBER(S): [id number]   ORDERING CLINICIAN: JESUSITA BLACKBURN   TECHNIQUE: MRI renal mass protocol pre and dynamically post contrast. 20 mL of Dotarem were administered intravenously without immediate complication.   FINDINGS: Kidneys: Bilateral 2 cm upper pole simple cysts. No enhancing soft tissue renal masses in either kidney. No hydronephrosis.   Liver: No mass. No hepatic steatosis.   Biliary: No intrahepatic or extrahepatic bile duct dilation. Cholecystectomy.   Spleen: No mass. No splenomegaly.   Pancreas: No mass or duct dilation.   Adrenals: Normal.   GI tract: No bowel wall thickening or dilation.   Lymph nodes: No lymphadenopathy.   Mesentery/peritoneum: No ascites or fluid collection.   Vasculature: Stable infrarenal abdominal aortic aneurysm measuring 4.2 cm containing noncalcified plaques and chronic dissection flaps.   Bones/Lower chest: No suspicious marrow replacing process.No pleural effusion.       No solid renal mass in the right kidney corresponding to prior ultrasound findings which likely represent normal renal parenchyma.   Bilateral simple renal cysts.   MACRO None   Signed by: Blanca Hannon 4/19/2024 7:13 AM Dictation workstation:   [id number]                Assessment/Plan   Principal Problem:    Cellulitis of left leg  Active Problems:    AAA (abdominal aortic aneurysm) (CMS-HCC)    Arteriosclerotic cardiovascular disease    Dehydration    Esophageal reflux    Hypertension    Pure hypercholesterolemia    Venous stasis dermatitis of both lower extremities    Venous insufficiency of both lower extremities    Stage 3a chronic kidney disease (Multi)    Cellulitis is definitely improving after adding vancomycin  Leg swelling is better with a dose  of Lasix  Stasis dermatitis treat with topical steroid cream  Keep legs elevated  Possible discharge on Monday       I spent  minutes in the professional and overall care of this patient.      Ana Pryor MD

## 2024-04-21 LAB
ANION GAP SERPL CALC-SCNC: 9 MMOL/L
BUN SERPL-MCNC: 25 MG/DL (ref 8–25)
CALCIUM SERPL-MCNC: 8.6 MG/DL (ref 8.5–10.4)
CHLORIDE SERPL-SCNC: 109 MMOL/L (ref 97–107)
CO2 SERPL-SCNC: 22 MMOL/L (ref 24–31)
CREAT SERPL-MCNC: 2.2 MG/DL (ref 0.4–1.6)
EGFRCR SERPLBLD CKD-EPI 2021: 30 ML/MIN/1.73M*2
ERYTHROCYTE [DISTWIDTH] IN BLOOD BY AUTOMATED COUNT: 12.2 % (ref 11.5–14.5)
GLUCOSE SERPL-MCNC: 97 MG/DL (ref 65–99)
HCT VFR BLD AUTO: 35.9 % (ref 41–52)
HGB BLD-MCNC: 12.4 G/DL (ref 13.5–17.5)
MCH RBC QN AUTO: 29 PG (ref 26–34)
MCHC RBC AUTO-ENTMCNC: 34.5 G/DL (ref 32–36)
MCV RBC AUTO: 84 FL (ref 80–100)
NRBC BLD-RTO: 0 /100 WBCS (ref 0–0)
PLATELET # BLD AUTO: 228 X10*3/UL (ref 150–450)
POTASSIUM SERPL-SCNC: 4.5 MMOL/L (ref 3.4–5.1)
RBC # BLD AUTO: 4.27 X10*6/UL (ref 4.5–5.9)
SODIUM SERPL-SCNC: 140 MMOL/L (ref 133–145)
VANCOMYCIN SERPL-MCNC: 19 UG/ML (ref 10–20)
WBC # BLD AUTO: 7.3 X10*3/UL (ref 4.4–11.3)

## 2024-04-21 PROCEDURE — 85027 COMPLETE CBC AUTOMATED: CPT | Performed by: INTERNAL MEDICINE

## 2024-04-21 PROCEDURE — 2500000004 HC RX 250 GENERAL PHARMACY W/ HCPCS (ALT 636 FOR OP/ED): Mod: JZ | Performed by: INTERNAL MEDICINE

## 2024-04-21 PROCEDURE — 80048 BASIC METABOLIC PNL TOTAL CA: CPT | Performed by: INTERNAL MEDICINE

## 2024-04-21 PROCEDURE — 80202 ASSAY OF VANCOMYCIN: CPT | Performed by: INTERNAL MEDICINE

## 2024-04-21 PROCEDURE — 1100000001 HC PRIVATE ROOM DAILY

## 2024-04-21 PROCEDURE — 2500000001 HC RX 250 WO HCPCS SELF ADMINISTERED DRUGS (ALT 637 FOR MEDICARE OP): Performed by: INTERNAL MEDICINE

## 2024-04-21 PROCEDURE — 36415 COLL VENOUS BLD VENIPUNCTURE: CPT | Performed by: INTERNAL MEDICINE

## 2024-04-21 PROCEDURE — 2500000006 HC RX 250 W HCPCS SELF ADMINISTERED DRUGS (ALT 637 FOR ALL PAYERS): Performed by: INTERNAL MEDICINE

## 2024-04-21 PROCEDURE — 99232 SBSQ HOSP IP/OBS MODERATE 35: CPT | Performed by: INTERNAL MEDICINE

## 2024-04-21 RX ORDER — VANCOMYCIN 1 G/200ML
1 INJECTION, SOLUTION INTRAVENOUS EVERY 24 HOURS
Status: DISCONTINUED | OUTPATIENT
Start: 2024-04-21 | End: 2024-04-22 | Stop reason: HOSPADM

## 2024-04-21 RX ADMIN — CEFTRIAXONE SODIUM 1 G: 1 INJECTION, SOLUTION INTRAVENOUS at 17:06

## 2024-04-21 RX ADMIN — ROSUVASTATIN CALCIUM 40 MG: 20 TABLET, COATED ORAL at 20:09

## 2024-04-21 RX ADMIN — FINASTERIDE 5 MG: 5 TABLET, FILM COATED ORAL at 08:30

## 2024-04-21 RX ADMIN — ENOXAPARIN SODIUM 40 MG: 40 INJECTION SUBCUTANEOUS at 17:06

## 2024-04-21 RX ADMIN — METOPROLOL SUCCINATE 25 MG: 25 TABLET, FILM COATED, EXTENDED RELEASE ORAL at 08:30

## 2024-04-21 RX ADMIN — AMLODIPINE BESYLATE 10 MG: 10 TABLET ORAL at 08:30

## 2024-04-21 RX ADMIN — TRIAMCINOLONE ACETONIDE: 1 CREAM TOPICAL at 20:09

## 2024-04-21 RX ADMIN — VANCOMYCIN 1 G: 1 INJECTION, SOLUTION INTRAVENOUS at 22:48

## 2024-04-21 RX ADMIN — ASPIRIN 325 MG ORAL TABLET 325 MG: 325 PILL ORAL at 15:30

## 2024-04-21 RX ADMIN — CLOPIDOGREL BISULFATE 75 MG: 75 TABLET ORAL at 15:30

## 2024-04-21 RX ADMIN — PANTOPRAZOLE SODIUM 40 MG: 40 TABLET, DELAYED RELEASE ORAL at 05:03

## 2024-04-21 RX ADMIN — ALFUZOSIN HYDROCHLORIDE 10 MG: 10 TABLET, EXTENDED RELEASE ORAL at 08:30

## 2024-04-21 RX ADMIN — TRIAMCINOLONE ACETONIDE: 1 CREAM TOPICAL at 08:30

## 2024-04-21 ASSESSMENT — PAIN SCALES - GENERAL
PAINLEVEL_OUTOF10: 0 - NO PAIN

## 2024-04-21 ASSESSMENT — COGNITIVE AND FUNCTIONAL STATUS - GENERAL: MOBILITY SCORE: 24

## 2024-04-21 ASSESSMENT — PAIN - FUNCTIONAL ASSESSMENT: PAIN_FUNCTIONAL_ASSESSMENT: 0-10

## 2024-04-21 NOTE — NURSING NOTE
Sitting at side of bed candi diet no distress per pt  legs less red less swollen ace wraps on bilat lower legs

## 2024-04-21 NOTE — NURSING NOTE
Assumed care of patient, pt awake and alert in bed with no needs at this time, call light within reach

## 2024-04-21 NOTE — PROGRESS NOTES
"Vancomycin Dosing by Pharmacy- FOLLOW UP    Charles Chan is a 78 y.o. year old male who Pharmacy has been consulted for vancomycin dosing for cellulitis, skin and soft tissue. Based on the patient's indication and renal status this patient is being dosed based on a goal AUC of 400-600.     Renal function is currently declining.    Current vancomycin dose: 1250 mg given every 24 hours    Estimated vancomycin AUC on current dose: 632 mg/L.hr     Visit Vitals  /61 (BP Location: Left arm, Patient Position: Sitting)   Pulse 63   Temp 36.6 °C (97.9 °F) (Oral)   Resp 16        Lab Results   Component Value Date    CREATININE 2.20 (H) 04/21/2024    CREATININE 2.00 (H) 04/20/2024    CREATININE 1.90 (H) 04/19/2024    CREATININE 2.10 (H) 04/18/2024        Patient weight is 114 Kg.    No results found for: \"CULTURE\"     I/O last 3 completed shifts:  In: 580 (5.1 mL/kg) [P.O.:80; IV Piggyback:500]  Out: - (0 mL/kg)   Weight: 113.7 kg   [unfilled]    No results found for: \"PATIENTTEMP\"     Assessment/Plan    Above goal AUC. Orders placed for new vancomcyin regimen of 1000 every 24 hours to begin at 4/21/24 at 2300hrs..    This dosing regimen is predicted by [employer] to result in the following pharmacokinetic parameters:    Loading dose: N/A  Regimen: 1000 mg IV every 24 hours.  Start time: 22:50 on 04/21/2024  Exposure target: AUC24 (range)400-600 mg/L.hr   AUC24,ss: 511 mg/L.hr  Probability of AUC24 > 400: 87 %  Ctrough,ss: 16.6 mg/L  Probability of Ctrough,ss > 20: 26 %  Probability of nephrotoxicity (Lodise ALAYNA 2009): 12 %      The next level will be obtained on 4/22/24 at AM labs. May be obtained sooner if clinically indicated.   Will continue to monitor renal function daily while on vancomycin and order serum creatinine at least every 48 hours if not already ordered.  Follow for continued vancomycin needs, clinical response, and signs/symptoms of toxicity.       Miguel Green, PharmD           "

## 2024-04-21 NOTE — PROGRESS NOTES
Charles Chan is a 78 y.o. male on day 4 of admission presenting with Cellulitis of left leg.      Subjective   Swelling in the legs is doing better.  Patient is keeping his legs elevated and moving more.  Redness is doing better.  Rash is doing better with the steroid cream       Objective     Last Recorded Vitals  /77 (BP Location: Left arm, Patient Position: Sitting)   Pulse 70   Temp 36.8 °C (98.2 °F) (Oral)   Resp 18   Wt 114 kg (250 lb 10.6 oz)   SpO2 97%   Intake/Output last 3 Shifts:    Intake/Output Summary (Last 24 hours) at 4/21/2024 1748  Last data filed at 4/21/2024 0741  Gross per 24 hour   Intake 430 ml   Output --   Net 430 ml       Admission Weight  Weight: 114 kg (250 lb 10.6 oz) (04/17/24 1608)    Daily Weight  04/17/24 : 114 kg (250 lb 10.6 oz)    Image Results  Lower extremity venous duplex left  Narrative: Interpreted By:  Nilam Russ,   STUDY:  [facility] LOWER EXTREMITY VENOUS DUPLEX LEFT;  4/20/2024 8:09 am      INDICATION:  Signs/Symptoms:Leg pain and swelling.      COMPARISON:  None.      ACCESSION NUMBER(S):  [id number]      ORDERING CLINICIAN:  JESUSITA BLACKBURN      TECHNIQUE:  Grayscale, color and spectral Doppler evaluation of the deep venous  system of the  left lower extremity was performed.      FINDINGS:  There is normal compressibility of the visualized common femoral,  femoral (central, mid, and peripheral aspects, including the  saphenofemoral junction), and popliteal veins.  There is intact  spontaneous and phasic variation throughout these vessels by spectral  Doppler.  Intact compressibility and/or flow within visualized  portions of the upper calf deep veins. Superficial soft tissue edema  in the visualized calf.      Intact flow and/or symmetric spectral Doppler waveform in the  contralateral common femoral vein.      Impression: No sonographic evidence for deep vein thrombosis within the evaluated  veins of the left lower extremity.      Superficial soft tissue  edema in the visualized upper calf.      MACRO:  None.      Signed by: Nilam Russ 4/21/2024 1:37 PM  Dictation workstation:   [id number]      Physical Exam  Vitals reviewed.   Constitutional:       Appearance: Normal appearance.   HENT:      Head: Normocephalic and atraumatic.      Right Ear: Tympanic membrane, ear canal and external ear normal.      Left Ear: Tympanic membrane, ear canal and external ear normal.      Nose: Nose normal.      Mouth/Throat:      Pharynx: Oropharynx is clear.   Eyes:      Extraocular Movements: Extraocular movements intact.      Conjunctiva/sclera: Conjunctivae normal.      Pupils: Pupils are equal, round, and reactive to light.   Cardiovascular:      Rate and Rhythm: Normal rate and regular rhythm.      Pulses: Normal pulses.      Heart sounds: Normal heart sounds.   Pulmonary:      Effort: Pulmonary effort is normal.      Breath sounds: Normal breath sounds.   Abdominal:      General: Abdomen is flat. Bowel sounds are normal.      Palpations: Abdomen is soft.   Musculoskeletal:      Cervical back: Normal range of motion and neck supple.   Skin:     General: Skin is warm and dry.      Findings: Erythema and rash present.      Comments: Dermatitis medial aspect of leg improving  Arrhythmia is improving   Neurological:      General: No focal deficit present.      Mental Status: He is alert and oriented to person, place, and time.   Psychiatric:         Mood and Affect: Mood normal.         Relevant Results             Scheduled medications  alfuzosin, 10 mg, oral, Daily  amLODIPine, 10 mg, oral, Daily  aspirin, 325 mg, oral, Daily  cefTRIAXone, 1 g, intravenous, q24h  clopidogrel, 75 mg, oral, Daily  docusate sodium, 100 mg, oral, BID  enoxaparin, 40 mg, subcutaneous, q24h  finasteride, 5 mg, oral, Daily  [Held by provider] furosemide, 40 mg, oral, Daily  metoprolol succinate XL, 25 mg, oral, Daily  pantoprazole, 40 mg, oral, Daily before breakfast   Or  pantoprazole, 40 mg,  intravenous, Daily before breakfast  polyethylene glycol, 17 g, oral, Daily  potassium chloride CR, 20 mEq, oral, Daily  rosuvastatin, 40 mg, oral, Daily  triamcinolone, , Topical, BID  vancomycin (Xellia) 1 g in 200 mL, 1 g, intravenous, q24h      Continuous medications     PRN medications  PRN medications: acetaminophen **OR** acetaminophen **OR** acetaminophen, ammonium lactate, benzocaine-menthol, dextromethorphan-guaifenesin, guaiFENesin, melatonin, ondansetron ODT **OR** ondansetron, vancomycin  Results for orders placed or performed during the hospital encounter of 04/17/24 (from the past 24 hour(s))   Vancomycin   Result Value Ref Range    Vancomycin 19.0 10.0 - 20.0 ug/mL   CBC   Result Value Ref Range    WBC 7.3 4.4 - 11.3 x10*3/uL    nRBC 0.0 0.0 - 0.0 /100 WBCs    RBC 4.27 (L) 4.50 - 5.90 x10*6/uL    Hemoglobin 12.4 (L) 13.5 - 17.5 g/dL    Hematocrit 35.9 (L) 41.0 - 52.0 %    MCV 84 80 - 100 fL    MCH 29.0 26.0 - 34.0 pg    MCHC 34.5 32.0 - 36.0 g/dL    RDW 12.2 11.5 - 14.5 %    Platelets 228 150 - 450 x10*3/uL   Basic Metabolic Panel   Result Value Ref Range    Glucose 97 65 - 99 mg/dL    Sodium 140 133 - 145 mmol/L    Potassium 4.5 3.4 - 5.1 mmol/L    Chloride 109 (H) 97 - 107 mmol/L    Bicarbonate 22 (L) 24 - 31 mmol/L    Urea Nitrogen 25 8 - 25 mg/dL    Creatinine 2.20 (H) 0.40 - 1.60 mg/dL    eGFR 30 (L) >60 mL/min/1.73m*2    Calcium 8.6 8.5 - 10.4 mg/dL    Anion Gap 9 <=19 mmol/L     Lower extremity venous duplex left    Result Date: 4/21/2024  Interpreted By:  Nilam Russ, STUDY: [facility] LOWER EXTREMITY VENOUS DUPLEX LEFT;  4/20/2024 8:09 am   INDICATION: Signs/Symptoms:Leg pain and swelling.   COMPARISON: None.   ACCESSION NUMBER(S): [id number]   ORDERING CLINICIAN: JESUSITA BLACKBURN   TECHNIQUE: Grayscale, color and spectral Doppler evaluation of the deep venous system of the  left lower extremity was performed.   FINDINGS: There is normal compressibility of the visualized common femoral, femoral  (central, mid, and peripheral aspects, including the saphenofemoral junction), and popliteal veins.  There is intact spontaneous and phasic variation throughout these vessels by spectral Doppler.  Intact compressibility and/or flow within visualized portions of the upper calf deep veins. Superficial soft tissue edema in the visualized calf.   Intact flow and/or symmetric spectral Doppler waveform in the contralateral common femoral vein.       No sonographic evidence for deep vein thrombosis within the evaluated veins of the left lower extremity.   Superficial soft tissue edema in the visualized upper calf.   MACRO: None.   Signed by: Nilam Russ 4/21/2024 1:37 PM Dictation workstation:   [id number]     Assessment/Plan                  Principal Problem:    Cellulitis of left leg  Active Problems:    AAA (abdominal aortic aneurysm) (CMS-[facility])    Arteriosclerotic cardiovascular disease    Dehydration    Esophageal reflux    Hypertension    Pure hypercholesterolemia    Venous stasis dermatitis of both lower extremities    Venous insufficiency of both lower extremities    Stage 3a chronic kidney disease (Multi)    Will switch to oral antibiotics tomorrow  Continue keeping the legs elevated  Creatinine has gone up a little  Monitor kidney function  Continue steroid cream for dermatitis  Blood pressure stable  Plan discharge home tomorrow if remains stable and kidney function improves              Ana Pryor MD

## 2024-04-22 ENCOUNTER — PHARMACY VISIT (OUTPATIENT)
Dept: PHARMACY | Facility: CLINIC | Age: 79
End: 2024-04-22
Payer: COMMERCIAL

## 2024-04-22 VITALS
DIASTOLIC BLOOD PRESSURE: 66 MMHG | SYSTOLIC BLOOD PRESSURE: 171 MMHG | OXYGEN SATURATION: 97 % | HEART RATE: 64 BPM | RESPIRATION RATE: 18 BRPM | WEIGHT: 250.66 LBS | TEMPERATURE: 98.6 F | BODY MASS INDEX: 33.22 KG/M2 | HEIGHT: 73 IN

## 2024-04-22 LAB
ANION GAP SERPL CALC-SCNC: 10 MMOL/L
BUN SERPL-MCNC: 27 MG/DL (ref 8–25)
CALCIUM SERPL-MCNC: 8.7 MG/DL (ref 8.5–10.4)
CHLORIDE SERPL-SCNC: 108 MMOL/L (ref 97–107)
CO2 SERPL-SCNC: 22 MMOL/L (ref 24–31)
CREAT SERPL-MCNC: 2.2 MG/DL (ref 0.4–1.6)
EGFRCR SERPLBLD CKD-EPI 2021: 30 ML/MIN/1.73M*2
ERYTHROCYTE [DISTWIDTH] IN BLOOD BY AUTOMATED COUNT: 12.1 % (ref 11.5–14.5)
GLUCOSE SERPL-MCNC: 94 MG/DL (ref 65–99)
HCT VFR BLD AUTO: 38.2 % (ref 41–52)
HGB BLD-MCNC: 12.5 G/DL (ref 13.5–17.5)
MCH RBC QN AUTO: 28.7 PG (ref 26–34)
MCHC RBC AUTO-ENTMCNC: 32.7 G/DL (ref 32–36)
MCV RBC AUTO: 88 FL (ref 80–100)
NRBC BLD-RTO: 0 /100 WBCS (ref 0–0)
PLATELET # BLD AUTO: 230 X10*3/UL (ref 150–450)
POTASSIUM SERPL-SCNC: 4.8 MMOL/L (ref 3.4–5.1)
RBC # BLD AUTO: 4.36 X10*6/UL (ref 4.5–5.9)
SODIUM SERPL-SCNC: 140 MMOL/L (ref 133–145)
VANCOMYCIN SERPL-MCNC: 20.1 UG/ML (ref 10–20)
WBC # BLD AUTO: 8.7 X10*3/UL (ref 4.4–11.3)

## 2024-04-22 PROCEDURE — RXMED WILLOW AMBULATORY MEDICATION CHARGE

## 2024-04-22 PROCEDURE — 2500000001 HC RX 250 WO HCPCS SELF ADMINISTERED DRUGS (ALT 637 FOR MEDICARE OP): Performed by: INTERNAL MEDICINE

## 2024-04-22 PROCEDURE — 85027 COMPLETE CBC AUTOMATED: CPT | Performed by: INTERNAL MEDICINE

## 2024-04-22 PROCEDURE — 99238 HOSP IP/OBS DSCHRG MGMT 30/<: CPT | Performed by: INTERNAL MEDICINE

## 2024-04-22 PROCEDURE — 80202 ASSAY OF VANCOMYCIN: CPT | Performed by: INTERNAL MEDICINE

## 2024-04-22 PROCEDURE — 80048 BASIC METABOLIC PNL TOTAL CA: CPT | Performed by: INTERNAL MEDICINE

## 2024-04-22 PROCEDURE — 36415 COLL VENOUS BLD VENIPUNCTURE: CPT | Performed by: INTERNAL MEDICINE

## 2024-04-22 RX ORDER — TRIAMCINOLONE ACETONIDE 1 MG/G
CREAM TOPICAL 2 TIMES DAILY
Qty: 80 G | Refills: 0 | Status: SHIPPED | OUTPATIENT
Start: 2024-04-22 | End: 2024-05-25 | Stop reason: SDUPTHER

## 2024-04-22 RX ORDER — CEPHALEXIN 500 MG/1
500 CAPSULE ORAL 3 TIMES DAILY
Qty: 21 CAPSULE | Refills: 0 | Status: SHIPPED | OUTPATIENT
Start: 2024-04-22 | End: 2024-04-27 | Stop reason: SDUPTHER

## 2024-04-22 RX ADMIN — FINASTERIDE 5 MG: 5 TABLET, FILM COATED ORAL at 08:51

## 2024-04-22 RX ADMIN — PANTOPRAZOLE SODIUM 40 MG: 40 TABLET, DELAYED RELEASE ORAL at 06:12

## 2024-04-22 RX ADMIN — TRIAMCINOLONE ACETONIDE: 1 CREAM TOPICAL at 08:52

## 2024-04-22 RX ADMIN — METOPROLOL SUCCINATE 25 MG: 25 TABLET, FILM COATED, EXTENDED RELEASE ORAL at 08:51

## 2024-04-22 RX ADMIN — AMLODIPINE BESYLATE 10 MG: 10 TABLET ORAL at 08:51

## 2024-04-22 RX ADMIN — ALFUZOSIN HYDROCHLORIDE 10 MG: 10 TABLET, EXTENDED RELEASE ORAL at 08:51

## 2024-04-22 ASSESSMENT — COGNITIVE AND FUNCTIONAL STATUS - GENERAL
DAILY ACTIVITIY SCORE: 24
MOBILITY SCORE: 24

## 2024-04-22 ASSESSMENT — PAIN SCALES - GENERAL: PAINLEVEL_OUTOF10: 0 - NO PAIN

## 2024-04-22 NOTE — PROGRESS NOTES
"Vancomycin Dosing by Pharmacy- FOLLOW UP    Charles Chan is a 78 y.o. year old male who Pharmacy has been consulted for vancomycin dosing for cellulitis, skin and soft tissue. Based on the patient's indication and renal status this patient is being dosed based on a goal AUC of 400-600.     Renal function is currently declining.    Current vancomycin dose: 1000 mg given every 24 hours    Estimated vancomycin AUC on current dose: 500 mg/L.hr     Visit Vitals  /66 (BP Location: Left arm, Patient Position: Lying)   Pulse 64   Temp 37 °C (98.6 °F) (Oral)   Resp 18        Lab Results   Component Value Date    CREATININE 2.20 (H) 04/22/2024    CREATININE 2.20 (H) 04/21/2024    CREATININE 2.00 (H) 04/20/2024    CREATININE 1.90 (H) 04/19/2024        Patient weight is No results found for: \"PTWEIGHT\"    No results found for: \"CULTURE\"     I/O last 3 completed shifts:  In: 630 (5.5 mL/kg) [P.O.:180; IV Piggyback:450]  Out: - (0 mL/kg)   Weight: 113.7 kg   [unfilled]    No results found for: \"PATIENTTEMP\"     Assessment/Plan    Within goal AUC range. Continue current vancomycin regimen.    This dosing regimen is predicted by [employer] to result in the following pharmacokinetic parameters:  Loading dose: N/A  Regimen: 1000 mg IV every 24 hours.  Start time: 22:48 on 04/22/2024  Exposure target: AUC24 (range)400-600 mg/L.hr   AUC24,ss: 500 mg/L.hr  Probability of AUC24 > 400: 86 %  Ctrough,ss: 16.1 mg/L  Probability of Ctrough,ss > 20: 21 %  Probability of nephrotoxicity (Lodise ALAYNA 2009): 11 %    The next level will be obtained on 4/29 at 0500. May be obtained sooner if clinically indicated.   Will continue to monitor renal function daily while on vancomycin and order serum creatinine at least every 48 hours if not already ordered.  Follow for continued vancomycin needs, clinical response, and signs/symptoms of toxicity.       MARILYN ORTEGA, PharmD           "

## 2024-04-22 NOTE — CARE PLAN
The patient's goals for the shift include      The clinical goals for the shift include decrease swelling    Problem: Skin  Goal: Decreased wound size/increased tissue granulation at next dressing change  Outcome: Progressing  Goal: Promote skin healing  Outcome: Progressing

## 2024-04-22 NOTE — CARE PLAN
Problem: Discharge Barriers  Goal: My discharge needs are met  Outcome: Progressing     Problem: Skin  Goal: Decreased wound size/increased tissue granulation at next dressing change  Outcome: Progressing  Goal: Promote skin healing  Outcome: Progressing

## 2024-04-23 NOTE — PROGRESS NOTES
"Charles Chan is a 78 y.o. male on day 5 of admission presenting with Cellulitis of left leg.    Subjective   Leg is feeling much better, dermatitis on the left ankle improving with steroid cream       Objective     Physical Exam  Vitals reviewed.   Constitutional:       Appearance: Normal appearance.   HENT:      Head: Normocephalic and atraumatic.      Right Ear: Tympanic membrane, ear canal and external ear normal.      Left Ear: Tympanic membrane, ear canal and external ear normal.      Nose: Nose normal.      Mouth/Throat:      Pharynx: Oropharynx is clear.   Eyes:      Extraocular Movements: Extraocular movements intact.      Conjunctiva/sclera: Conjunctivae normal.      Pupils: Pupils are equal, round, and reactive to light.   Cardiovascular:      Rate and Rhythm: Normal rate and regular rhythm.      Pulses: Normal pulses.      Heart sounds: Normal heart sounds.   Pulmonary:      Effort: Pulmonary effort is normal.      Breath sounds: Normal breath sounds.   Abdominal:      General: Abdomen is flat. Bowel sounds are normal.      Palpations: Abdomen is soft.   Musculoskeletal:      Cervical back: Normal range of motion and neck supple.      Right lower leg: Edema present.      Left lower leg: Edema present.   Skin:     General: Skin is warm and dry.      Findings: Erythema present.      Comments: Stasis dermatitis both legs  Erythema (improving   Neurological:      General: No focal deficit present.      Mental Status: He is alert and oriented to person, place, and time.   Psychiatric:         Mood and Affect: Mood normal.         Last Recorded Vitals  Blood pressure 171/66, pulse 64, temperature 37 °C (98.6 °F), temperature source Oral, resp. rate 18, height 1.854 m (6' 1\"), weight 114 kg (250 lb 10.6 oz), SpO2 97%.  Intake/Output last 3 Shifts:  I/O last 3 completed shifts:  In: 780 (6.9 mL/kg) [P.O.:580; IV Piggyback:200]  Out: - (0 mL/kg)   Weight: 113.7 kg     Relevant Results                       "       Assessment/Plan   Principal Problem:    Cellulitis of left leg  Active Problems:    AAA (abdominal aortic aneurysm) (CMS-HCC)    Arteriosclerotic cardiovascular disease    Dehydration    Esophageal reflux    Hypertension    Pure hypercholesterolemia    Venous stasis dermatitis of both lower extremities    Venous insufficiency of both lower extremities    Stage 3a chronic kidney disease (Multi)    Changed to oral antibiotic  Emphasize low-salt diet  Keep legs elevated  Monitor kidney function  Follow-up with PCP on Thursday  Advised not to go to work till then can use compression stockings     I spent  minutes in the professional and overall care of this patient.      Ana Pryor MD

## 2024-04-23 NOTE — DISCHARGE SUMMARY
Discharge Diagnosis  Cellulitis of left leg    Issues Requiring Follow-Up  Stasis dermatitis both legs  Hypertension  Chronic kidney disease    Test Results Pending At Discharge  Pending Labs       No current pending labs.            Hospital Course   Charles Chan is a 78 y.o. male presenting with left leg cellulitis and Nausea vomiting from antibiotics.  Patient has history of coronary artery disease with 2 stents placed, hypertension, hyperlipidemia, chronic kidney disease, colectomy for diverticular abscess ruptured, appendectomy, cholecystectomy.  Patient did not have a echocardiogram done recently.  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  He has issues with swelling in the legs for a while he has no circulation issues and stasis dermatitis in the leg but he is not very compliant with wearing compression stockings not very compliant with keeping the legs elevated.  3 weeks ago he started having increased swelling in the legs increased redness in the left leg more than the right.  He tried to treat it with leg elevation and compression stocking but symptoms kept getting worse so finally he came to see his PCP on Saturday but did not get antibiotic started until Monday.  He started Cipro and Doxy and he could not tolerate the antibiotics.  He started getting sick on it felt nauseous and today went back to see him and he decided to admit him because patient was getting dehydrated and the cellulitis was not improving and getting worse.   In the hospital treated with vancomycin and cephalexin did slow improvement.  Emphasized leg elevation getting down salt.  Echocardiogram showed normal ejection fraction  Discussed with family regarding Norvasc use if that could be causing leg swelling also    Pertinent Physical Exam At Time of Discharge  Physical Exam  Vitals reviewed.   Constitutional:       Appearance: Normal appearance.   HENT:      Head: Normocephalic and  atraumatic.      Right Ear: Tympanic membrane, ear canal and external ear normal.      Left Ear: Tympanic membrane, ear canal and external ear normal.      Nose: Nose normal.      Mouth/Throat:      Pharynx: Oropharynx is clear.   Eyes:      Extraocular Movements: Extraocular movements intact.      Conjunctiva/sclera: Conjunctivae normal.      Pupils: Pupils are equal, round, and reactive to light.   Cardiovascular:      Rate and Rhythm: Normal rate and regular rhythm.      Pulses: Normal pulses.      Heart sounds: Normal heart sounds.   Pulmonary:      Effort: Pulmonary effort is normal.      Breath sounds: Normal breath sounds.   Abdominal:      General: Abdomen is flat. Bowel sounds are normal.      Palpations: Abdomen is soft.   Musculoskeletal:      Cervical back: Normal range of motion and neck supple.      Right lower leg: Edema present.      Left lower leg: Edema present.   Skin:     General: Skin is warm and dry.      Findings: Erythema present.      Comments: Stasis dermatitis both legs from.  Left leg improving   Neurological:      General: No focal deficit present.      Mental Status: He is alert and oriented to person, place, and time.   Psychiatric:         Mood and Affect: Mood normal.         Home Medications     Medication List      START taking these medications     cephalexin 500 mg capsule; Commonly known as: Keflex; Take 1 capsule   (500 mg) by mouth 3 times a day for 7 days.   triamcinolone 0.1 % cream; Commonly known as: Kenalog; Apply topically 2   times a day.     CHANGE how you take these medications     Serevent Diskus 50 mcg/dose diskus inhaler; Generic drug: salmeterol;   Inhale 1 puff 2 times a day.; What changed: when to take this, reasons to   take this     CONTINUE taking these medications     alfuzosin 10 mg 24 hr tablet; Commonly known as: Uroxatral; TAKE 1   TABLET BY MOUTH EVERY DAY   amLODIPine 10 mg tablet; Commonly known as: Norvasc; TAKE 1 TABLET BY   MOUTH EVERY DAY    ammonium lactate 12 % lotion; Commonly known as: Lac-Hydrin   aspirin 325 mg tablet   cholecalciferol 5,000 Units tablet; Commonly known as: Vitamin D-3; TAKE   1 TABLET BY MOUTH EVERY DAY   clopidogrel 75 mg tablet; Commonly known as: Plavix; TAKE 1 TABLET BY   MOUTH EVERY DAY   cyanocobalamin (vitamin B-12) 1,000 mcg tablet extended release;   Commonly known as: Vitamin B-12   famotidine 20 mg tablet; Commonly known as: Pepcid; TAKE 1 TABLET BY   MOUTH TWICE A DAY   finasteride 5 mg tablet; Commonly known as: Proscar; TAKE 1 TABLET BY   MOUTH EVERY DAY   fluticasone 50 mcg/actuation nasal spray; Commonly known as: Flonase;   USE 1 SPRAY EVERY DAY IN EACH NOSTRIL *LASTS 60 DAYS*   metoprolol succinate XL 25 mg 24 hr tablet; Commonly known as:   Toprol-XL; Take 1 tablet (25 mg) by mouth once daily. DO NOT CRUSH OR CHEW   pantoprazole 40 mg EC tablet; Commonly known as: ProtoNix; TAKE 1 TABLET   BY MOUTH ONCE DAILY IN THE MORNING BEFORE A MEAL. DO NOT CRUSH, CHEW OR   SPLIT   rosuvastatin 40 mg tablet; Commonly known as: Crestor; Take 1 tablet (40   mg) by mouth once daily.     STOP taking these medications     bacitracin 500 unit/gram ointment   benzonatate 100 mg capsule; Commonly known as: Tessalon   ciprofloxacin 500 mg tablet; Commonly known as: Cipro   doxycycline 100 mg capsule; Commonly known as: Vibramycin   furosemide 40 mg tablet; Commonly known as: Lasix   potassium chloride CR 20 mEq ER tablet; Commonly known as: Klor-Con M20       Outpatient Follow-Up  Future Appointments   Date Time Provider Department [place]   4/27/2024 12:15 PM Moi Pryor MD [id number] [facility]   5/3/2024  9:00 AM MENT VASC ROOM [id number] [facility] [place] [facility]   10/11/2024  9:30 AM MENT ECHO/STRESS [id number] [facility] [place] [facility]   10/11/2024 10:30 AM Milly Kim, APRN-CNP [id number] [facility]       Ana Pryor MD

## 2024-04-24 ENCOUNTER — TELEPHONE (OUTPATIENT)
Dept: INPATIENT UNIT | Facility: HOSPITAL | Age: 79
End: 2024-04-24
Payer: MEDICARE

## 2024-04-27 ENCOUNTER — OFFICE VISIT (OUTPATIENT)
Dept: PRIMARY CARE | Facility: CLINIC | Age: 79
End: 2024-04-27
Payer: MEDICARE

## 2024-04-27 VITALS
SYSTOLIC BLOOD PRESSURE: 138 MMHG | DIASTOLIC BLOOD PRESSURE: 72 MMHG | HEIGHT: 73 IN | WEIGHT: 252 LBS | BODY MASS INDEX: 33.4 KG/M2

## 2024-04-27 DIAGNOSIS — L03.90 CELLULITIS, UNSPECIFIED CELLULITIS SITE: Primary | ICD-10-CM

## 2024-04-27 DIAGNOSIS — E78.00 HYPERCHOLESTEROLEMIA: ICD-10-CM

## 2024-04-27 DIAGNOSIS — I87.2 VENOUS INSUFFICIENCY OF BOTH LOWER EXTREMITIES: ICD-10-CM

## 2024-04-27 DIAGNOSIS — N28.9 RENAL INSUFFICIENCY: ICD-10-CM

## 2024-04-27 DIAGNOSIS — I87.2 VENOUS STASIS DERMATITIS OF BOTH LOWER EXTREMITIES: ICD-10-CM

## 2024-04-27 DIAGNOSIS — I10 HYPERTENSION, UNSPECIFIED TYPE: ICD-10-CM

## 2024-04-27 PROCEDURE — 1160F RVW MEDS BY RX/DR IN RCRD: CPT | Performed by: INTERNAL MEDICINE

## 2024-04-27 PROCEDURE — 1111F DSCHRG MED/CURRENT MED MERGE: CPT | Performed by: INTERNAL MEDICINE

## 2024-04-27 PROCEDURE — 3078F DIAST BP <80 MM HG: CPT | Performed by: INTERNAL MEDICINE

## 2024-04-27 PROCEDURE — 3075F SYST BP GE 130 - 139MM HG: CPT | Performed by: INTERNAL MEDICINE

## 2024-04-27 PROCEDURE — 1159F MED LIST DOCD IN RCRD: CPT | Performed by: INTERNAL MEDICINE

## 2024-04-27 PROCEDURE — 99214 OFFICE O/P EST MOD 30 MIN: CPT | Performed by: INTERNAL MEDICINE

## 2024-04-27 RX ORDER — CEPHALEXIN 500 MG/1
500 CAPSULE ORAL 3 TIMES DAILY
Qty: 21 CAPSULE | Refills: 0 | Status: SHIPPED | OUTPATIENT
Start: 2024-04-27 | End: 2024-05-04 | Stop reason: SDUPTHER

## 2024-04-27 ASSESSMENT — ENCOUNTER SYMPTOMS
LOSS OF SENSATION IN FEET: 0
OCCASIONAL FEELINGS OF UNSTEADINESS: 0
DEPRESSION: 0

## 2024-04-28 NOTE — PROGRESS NOTES
"Subjective   Patient ID: Charles Chan is a 78 y.o. male who presents for multiple medical issues..    Mr. Chan today came here for multiple medical issues.  He was in a hospital of  Dr. Ana Pryor.  He had a leg cellulitis, treated.  There is a concern with kidney function. He is also seeing  Dr. Willis.  Appetite and weight are okay.  No problem.    I have personally reviewed the patient's Past Medical History, Medications, Allergies, Social History, and Family History in the EMR.    Review of Systems   All other systems reviewed and are negative.    Objective   /72   Ht 1.854 m (6' 1\")   Wt 114 kg (252 lb)   BMI 33.25 kg/m²     Physical Exam  Vitals reviewed.   Cardiovascular:      Heart sounds: Normal heart sounds, S1 normal and S2 normal. No murmur heard.     No friction rub.   Pulmonary:      Effort: Pulmonary effort is normal.      Breath sounds: Normal breath sounds and air entry.   Abdominal:      Palpations: There is no hepatomegaly, splenomegaly or mass.   Musculoskeletal:      Right lower leg: Edema (much better) present.      Left lower leg: Edema (much better) present.   Lymphadenopathy:      Lower Body: No right inguinal adenopathy. No left inguinal adenopathy.   Skin:     Comments: Cellulitis improving.   Neurological:      Cranial Nerves: Cranial nerves 2-12 are intact.      Sensory: No sensory deficit.      Motor: Motor function is intact.      Deep Tendon Reflexes: Reflexes are normal and symmetric.     LAB WORK:  Laboratory testing discussed.    Assessment/Plan   Problem List Items Addressed This Visit             ICD-10-CM       Cardiac and Vasculature    Hypertension I10    Relevant Orders    CBC    Hypercholesterolemia E78.00    Relevant Orders    Comprehensive Metabolic Panel    Venous stasis dermatitis of both lower extremities I87.2    Relevant Medications    cephalexin (Keflex) 500 mg capsule    Venous insufficiency of both lower extremities I87.2    Relevant Medications    " cephalexin (Keflex) 500 mg capsule       Infectious Diseases    Cellulitis - Primary L03.90     Other Visit Diagnoses         Codes    Renal insufficiency     N28.9        1. Cellulitis, slight improvement.  Continue Keflex.  2. Renal insufficiency.  I will monitor kidney function.  3. Cardiac.  He is seeing cardiologist.  4. ______.  Monitor.  5. Follow-up appointment with me on Friday with kidney function test.  6. While the patient was here he needs a compression stockings because of the legs.  He will benefit from custom with a zipper.  I told him to go to [facility] hopefully that will be a covered benefit, there is a medical necessity here.    Scribe Attestation  By signing my name below, I, Gin Salazar attest that this documentation has been prepared under the direction and in the presence of Moi Pryor MD.

## 2024-04-30 ENCOUNTER — LAB (OUTPATIENT)
Dept: LAB | Facility: LAB | Age: 79
End: 2024-04-30
Payer: MEDICARE

## 2024-04-30 DIAGNOSIS — E78.00 HYPERCHOLESTEROLEMIA: ICD-10-CM

## 2024-04-30 DIAGNOSIS — I10 HYPERTENSION, UNSPECIFIED TYPE: ICD-10-CM

## 2024-04-30 LAB
ALBUMIN SERPL BCP-MCNC: 3.5 G/DL (ref 3.4–5)
ALP SERPL-CCNC: 90 U/L (ref 33–136)
ALT SERPL W P-5'-P-CCNC: 16 U/L (ref 10–52)
ANION GAP SERPL CALC-SCNC: 14 MMOL/L (ref 10–20)
AST SERPL W P-5'-P-CCNC: 17 U/L (ref 9–39)
BILIRUB SERPL-MCNC: 0.7 MG/DL (ref 0–1.2)
BUN SERPL-MCNC: 26 MG/DL (ref 6–23)
CALCIUM SERPL-MCNC: 8.9 MG/DL (ref 8.6–10.6)
CHLORIDE SERPL-SCNC: 110 MMOL/L (ref 98–107)
CO2 SERPL-SCNC: 24 MMOL/L (ref 21–32)
CREAT SERPL-MCNC: 2.19 MG/DL (ref 0.5–1.3)
EGFRCR SERPLBLD CKD-EPI 2021: 30 ML/MIN/1.73M*2
ERYTHROCYTE [DISTWIDTH] IN BLOOD BY AUTOMATED COUNT: 12.4 % (ref 11.5–14.5)
GLUCOSE SERPL-MCNC: 92 MG/DL (ref 74–99)
HCT VFR BLD AUTO: 39 % (ref 41–52)
HGB BLD-MCNC: 12.8 G/DL (ref 13.5–17.5)
MCH RBC QN AUTO: 28.8 PG (ref 26–34)
MCHC RBC AUTO-ENTMCNC: 32.8 G/DL (ref 32–36)
MCV RBC AUTO: 88 FL (ref 80–100)
NRBC BLD-RTO: 0 /100 WBCS (ref 0–0)
PLATELET # BLD AUTO: 277 X10*3/UL (ref 150–450)
POTASSIUM SERPL-SCNC: 4.7 MMOL/L (ref 3.5–5.3)
PROT SERPL-MCNC: 6.8 G/DL (ref 6.4–8.2)
RBC # BLD AUTO: 4.44 X10*6/UL (ref 4.5–5.9)
SODIUM SERPL-SCNC: 143 MMOL/L (ref 136–145)
WBC # BLD AUTO: 9 X10*3/UL (ref 4.4–11.3)

## 2024-04-30 PROCEDURE — 85027 COMPLETE CBC AUTOMATED: CPT

## 2024-04-30 PROCEDURE — 80053 COMPREHEN METABOLIC PANEL: CPT

## 2024-04-30 PROCEDURE — 36415 COLL VENOUS BLD VENIPUNCTURE: CPT

## 2024-05-03 ENCOUNTER — OFFICE VISIT (OUTPATIENT)
Dept: PRIMARY CARE | Facility: CLINIC | Age: 79
End: 2024-05-03
Payer: MEDICARE

## 2024-05-03 ENCOUNTER — HOSPITAL ENCOUNTER (OUTPATIENT)
Dept: VASCULAR MEDICINE | Facility: CLINIC | Age: 79
Discharge: HOME | End: 2024-05-03
Payer: MEDICARE

## 2024-05-03 VITALS
HEIGHT: 72 IN | DIASTOLIC BLOOD PRESSURE: 68 MMHG | SYSTOLIC BLOOD PRESSURE: 132 MMHG | WEIGHT: 251 LBS | BODY MASS INDEX: 34 KG/M2

## 2024-05-03 DIAGNOSIS — L30.9 DERMATITIS: ICD-10-CM

## 2024-05-03 DIAGNOSIS — I65.23 OCCLUSION AND STENOSIS OF BILATERAL CAROTID ARTERIES: ICD-10-CM

## 2024-05-03 DIAGNOSIS — R60.9 EDEMA, UNSPECIFIED TYPE: ICD-10-CM

## 2024-05-03 DIAGNOSIS — L03.90 CELLULITIS, UNSPECIFIED CELLULITIS SITE: Primary | ICD-10-CM

## 2024-05-03 DIAGNOSIS — I10 PRIMARY HYPERTENSION: ICD-10-CM

## 2024-05-03 DIAGNOSIS — N18.30 CHRONIC RENAL FAILURE, STAGE 3 (MODERATE), UNSPECIFIED WHETHER STAGE 3A OR 3B CKD (MULTI): ICD-10-CM

## 2024-05-03 DIAGNOSIS — I25.10 CORONARY ARTERY DISEASE INVOLVING NATIVE CORONARY ARTERY OF NATIVE HEART WITHOUT ANGINA PECTORIS: ICD-10-CM

## 2024-05-03 DIAGNOSIS — I87.2 VENOUS STASIS DERMATITIS OF BOTH LOWER EXTREMITIES: ICD-10-CM

## 2024-05-03 PROCEDURE — 1160F RVW MEDS BY RX/DR IN RCRD: CPT | Performed by: INTERNAL MEDICINE

## 2024-05-03 PROCEDURE — 1159F MED LIST DOCD IN RCRD: CPT | Performed by: INTERNAL MEDICINE

## 2024-05-03 PROCEDURE — 3075F SYST BP GE 130 - 139MM HG: CPT | Performed by: INTERNAL MEDICINE

## 2024-05-03 PROCEDURE — 93880 EXTRACRANIAL BILAT STUDY: CPT

## 2024-05-03 PROCEDURE — 93880 EXTRACRANIAL BILAT STUDY: CPT | Performed by: INTERNAL MEDICINE

## 2024-05-03 PROCEDURE — 1111F DSCHRG MED/CURRENT MED MERGE: CPT | Performed by: INTERNAL MEDICINE

## 2024-05-03 PROCEDURE — 99214 OFFICE O/P EST MOD 30 MIN: CPT | Performed by: INTERNAL MEDICINE

## 2024-05-03 PROCEDURE — 3078F DIAST BP <80 MM HG: CPT | Performed by: INTERNAL MEDICINE

## 2024-05-03 RX ORDER — ECONAZOLE NITRATE 10 MG/G
CREAM TOPICAL
Qty: 85 G | Refills: 0 | Status: SHIPPED | OUTPATIENT
Start: 2024-05-03

## 2024-05-03 RX ORDER — CIPROFLOXACIN 500 MG/1
500 TABLET ORAL 2 TIMES DAILY
Qty: 20 TABLET | Refills: 0 | Status: SHIPPED | OUTPATIENT
Start: 2024-05-03 | End: 2024-05-15 | Stop reason: ALTCHOICE

## 2024-05-03 RX ORDER — DOXYCYCLINE 100 MG/1
100 CAPSULE ORAL 2 TIMES DAILY
Qty: 20 CAPSULE | Refills: 0 | Status: SHIPPED | OUTPATIENT
Start: 2024-05-03 | End: 2024-05-15 | Stop reason: SINTOL

## 2024-05-03 ASSESSMENT — ENCOUNTER SYMPTOMS
OCCASIONAL FEELINGS OF UNSTEADINESS: 0
LOSS OF SENSATION IN FEET: 0
DEPRESSION: 0

## 2024-05-03 NOTE — PROGRESS NOTES
Subjective   Patient ID: Charles Chan is a 78 y.o. male who presents for Follow-up (on various conditions.).    It is always a delight to serve Mr. Chan.  1. Both legs swelling and tenderness are getting better.  Kidney okay.  2. He did not get the stockings and he and I both are unhappy about it.  He came here for follow-up on various conditions.    I have personally reviewed the patient's Past Medical History, Medications, Allergies, Social History, and Family History in the EMR.    Review of Systems   All other systems reviewed and are negative.    Objective   /68   Ht 1.829 m (6')   Wt 114 kg (251 lb)   BMI 34.04 kg/m²     Physical Exam  Vitals reviewed.   Cardiovascular:      Heart sounds: Normal heart sounds, S1 normal and S2 normal. No murmur heard.     No friction rub.   Pulmonary:      Effort: Pulmonary effort is normal.      Breath sounds: Normal breath sounds and air entry.   Abdominal:      Palpations: There is no hepatomegaly, splenomegaly or mass.   Musculoskeletal:      Right lower leg: No edema.      Left lower leg: No edema.   Lymphadenopathy:      Lower Body: No right inguinal adenopathy. No left inguinal adenopathy.   Skin:     Comments: Both legs below-knee, above-ankle red, inflamed and tender.  Stasis dermatitis, cellulitis combo present.   Neurological:      Cranial Nerves: Cranial nerves 2-12 are intact.      Sensory: No sensory deficit.      Motor: Motor function is intact.      Deep Tendon Reflexes: Reflexes are normal and symmetric.     LAB WORK:  Laboratory testing discussed.    Assessment/Plan   Problem List Items Addressed This Visit             ICD-10-CM       Cardiac and Vasculature    Hypertension I10    Venous stasis dermatitis of both lower extremities I87.2    Relevant Medications    ciprofloxacin (Cipro) 500 mg tablet    doxycycline (Vibramycin) 100 mg capsule    Other Relevant Orders    Basic metabolic panel       Infectious Diseases    Cellulitis - Primary  L03.90     Other Visit Diagnoses         Codes    Edema, unspecified type     R60.9    Chronic renal failure, stage 3 (moderate), unspecified whether stage 3a or 3b CKD (Multi)     N18.30        1. Cellulitis.  Cipro, doxy.  2. Edema, on Lasix and potassium.  3. Dermatitis.  I think I will give cocktail of three creams to work, it should work.  4. Chronic renal failure, stage 3.  I am monitoring it carefully.  I adjusted the dose of Cipro accordingly.  5. Blood pressure okay.  He definitely will benefit from stocking pressure 30, probably custom will be okay, but meanwhile he can try generic.  6. Follow-up appointment with me around 05/20/2024.  If situation changes he will reach out to me.    Scribe Attestation  By signing my name below, ILeonie Scribe attest that this documentation has been prepared under the direction and in the presence of Moi Pryor MD.

## 2024-05-04 DIAGNOSIS — I87.2 VENOUS INSUFFICIENCY OF BOTH LOWER EXTREMITIES: ICD-10-CM

## 2024-05-04 DIAGNOSIS — I87.2 VENOUS STASIS DERMATITIS OF BOTH LOWER EXTREMITIES: ICD-10-CM

## 2024-05-05 RX ORDER — CEPHALEXIN 500 MG/1
500 CAPSULE ORAL 3 TIMES DAILY
Qty: 30 CAPSULE | Refills: 0 | Status: SHIPPED | OUTPATIENT
Start: 2024-05-05 | End: 2024-05-15

## 2024-05-15 ENCOUNTER — OFFICE VISIT (OUTPATIENT)
Dept: VASCULAR SURGERY | Facility: CLINIC | Age: 79
End: 2024-05-15
Payer: MEDICARE

## 2024-05-15 VITALS
OXYGEN SATURATION: 98 % | BODY MASS INDEX: 33.18 KG/M2 | HEART RATE: 64 BPM | DIASTOLIC BLOOD PRESSURE: 71 MMHG | WEIGHT: 245 LBS | SYSTOLIC BLOOD PRESSURE: 158 MMHG | HEIGHT: 72 IN

## 2024-05-15 DIAGNOSIS — I65.23 CAROTID STENOSIS, BILATERAL: Primary | ICD-10-CM

## 2024-05-15 DIAGNOSIS — E78.00 PURE HYPERCHOLESTEROLEMIA: ICD-10-CM

## 2024-05-15 DIAGNOSIS — I71.43 INFRARENAL ABDOMINAL AORTIC ANEURYSM (AAA) WITHOUT RUPTURE (CMS-HCC): ICD-10-CM

## 2024-05-15 PROCEDURE — 99203 OFFICE O/P NEW LOW 30 MIN: CPT | Performed by: NURSE PRACTITIONER

## 2024-05-15 PROCEDURE — 1160F RVW MEDS BY RX/DR IN RCRD: CPT | Performed by: NURSE PRACTITIONER

## 2024-05-15 PROCEDURE — 1111F DSCHRG MED/CURRENT MED MERGE: CPT | Performed by: NURSE PRACTITIONER

## 2024-05-15 PROCEDURE — 3077F SYST BP >= 140 MM HG: CPT | Performed by: NURSE PRACTITIONER

## 2024-05-15 PROCEDURE — 1159F MED LIST DOCD IN RCRD: CPT | Performed by: NURSE PRACTITIONER

## 2024-05-15 PROCEDURE — 99213 OFFICE O/P EST LOW 20 MIN: CPT | Performed by: NURSE PRACTITIONER

## 2024-05-15 PROCEDURE — 3078F DIAST BP <80 MM HG: CPT | Performed by: NURSE PRACTITIONER

## 2024-05-15 PROCEDURE — 1036F TOBACCO NON-USER: CPT | Performed by: NURSE PRACTITIONER

## 2024-05-15 ASSESSMENT — ENCOUNTER SYMPTOMS
DEPRESSION: 0
LOSS OF SENSATION IN FEET: 0
OCCASIONAL FEELINGS OF UNSTEADINESS: 0

## 2024-05-15 NOTE — PATIENT INSTRUCTIONS
Charles,    It was really nice to meet you!  Thank you for allowing me to participate in your care.    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    Normally, I would order a CAT scan of the arteries with contrast, however, your kidney function is not great.  Therefore, I am going to have you follow-up with Dr. Botello without the CAT scan.    Someone from our office will call you.

## 2024-05-15 NOTE — PROGRESS NOTES
History Of Present Illness  Charles Chan is a 79 y.o. male presenting with a past medical history of hypertension, hyperlipidemia, CKD, leg edema, venous stasis dermatitis.  Patient was referred by Dr. Harvey Willis after having an abnormal carotid duplex.  The patient had critical carotid stenosis that was identified on May 3, 2024.  Patient has elevated velocities in the right internal carotid artery measuring 707/221 cm/sec in the proximal ICA. The patient states he was told at one time that he had a mini stroke but did not have any symptoms.   He currently has no complaints of stroke, TIA, or amaurosis fugax     Past Medical History  He has a past medical history of BPH (benign prostatic hyperplasia), Chronic renal disease, stage 2, mildly decreased glomerular filtration rate (GFR) between 60-89 mL/min/1.73 square meter, High cholesterol, Hypertension, Hypothyroid, Overweight, Personal history of other diseases of the circulatory system, Personal history of other diseases of the digestive system (10/27/2021), and Prediabetes.    Surgical History  He has a past surgical history that includes Gallbladder surgery (06/13/2013); Colon surgery (06/13/2013); and Appendectomy (06/13/2013).     Social History  He reports that he has never smoked. He has never used smokeless tobacco. He reports that he does not drink alcohol and does not use drugs.    Family History  Family History   Problem Relation Name Age of Onset    Hypertension Mother      Heart disease Mother      Stroke Father          Allergies  Patient has no known allergies.    Review of Systems    CONSTITUTIONAL: Denies weight loss, fever and chills.    HEENT: Denies changes in vision and hearing.    RESPIRATORY: Denies SOB and cough.    CV: Denies palpitations and CP. Positive for leg edema bilaterally    GI: Denies abdominal pain, nausea, vomiting and diarrhea.    : Denies dysuria and urinary frequency.    MSK: Denies myalgia and joint pain.    SKIN:  Denies rash and pruritus.    VASC: Denies claudication, ischemic rest pain, or open wounds or sores    NEUROLOGICAL: Denies headache and syncope.    PSYCHIATRIC: Denies recent changes in mood. Denies anxiety and depression.     Physical Exam    General: Pt is alert and oriented x 3. Pleasant, conversive  HEENT: Head is atraumatic, normocephalic.  Positive for bilateral cervical bruits  Cardiac: Normal S1-S2.  Regular rate and rhythm.  No murmurs.  Respiratory: Lungs clear to auscultation.  No adventitious sounds.  Abdomen: Soft, nondistended, nontender.  Bowel sounds x4 quadrants.  Pulse exam: Palpable brachial and radial pulses bilaterall.  Lower extremities are warm and well-perfused  Extremities: No significant edema noted.  Extremities are warm to the touch and normal in color.  No open wounds or sores.  Neuro: Moves all extremities spontaneously.  No focal deficits.  Psych: Appropriate affect.  Answers questions appropriately.     Last Recorded Vitals  /71 (BP Location: Left arm, Patient Position: Sitting)   Pulse 64   Wt 111 kg (245 lb)   SpO2 98%     Relevant Results    Current Outpatient Medications   Medication Instructions    alfuzosin (Uroxatral) 10 mg 24 hr tablet TAKE 1 TABLET BY MOUTH EVERY DAY    amLODIPine (Norvasc) 10 mg tablet TAKE 1 TABLET BY MOUTH EVERY DAY    ammonium lactate (Lac-Hydrin) 12 % lotion Apply and rub in a thin film to affected areas twice daily (am and pm)    aspirin 325 mg, oral, Daily    cephalexin (KEFLEX) 500 mg, oral, 3 times daily    cholecalciferol (VITAMIN D-3) 5,000 Units, oral, Daily    clopidogrel (Plavix) 75 mg tablet TAKE 1 TABLET BY MOUTH EVERY DAY    cyanocobalamin (vitamin B-12) (VITAMIN B-12) 1,000 mcg, oral, Daily, As directed    econazole nitrate 1 % cream APPLY TO AFFECTED AREA EVERY DAY AS DIRECTED *30 DAY SUPPLY*    famotidine (Pepcid) 20 mg tablet TAKE 1 TABLET BY MOUTH TWICE A DAY    finasteride (Proscar) 5 mg tablet TAKE 1 TABLET BY MOUTH EVERY DAY     fluticasone (Flonase) 50 mcg/actuation nasal spray USE 1 SPRAY EVERY DAY IN EACH NOSTRIL *LASTS 60 DAYS*    metoprolol succinate XL (TOPROL-XL) 25 mg, oral, Daily, DO NOT CRUSH OR CHEW    pantoprazole (ProtoNix) 40 mg EC tablet TAKE 1 TABLET BY MOUTH ONCE DAILY IN THE MORNING BEFORE A MEAL. DO NOT CRUSH, CHEW OR SPLIT    rosuvastatin (CRESTOR) 40 mg, oral, Daily    salmeterol (Serevent Diskus) 50 mcg/dose diskus inhaler 1 puff, inhalation, 2 times daily RT    triamcinolone (Kenalog) 0.1 % cream Topical, 2 times daily        Vascular US carotid artery duplex bilateral    Result Date: 5/3/2024            [place] [address] Tel [phone] and Fax [phone]  Vascular Lab Report VASC US CAROTID ARTERY DUPLEX BILATERAL  Patient Name:      CURTIS BELLA VALDO     Reading Physician:  [id number] Philip Danielle MD, RPVI Study Date:        5/3/2024            Ordering Physician: [id number] ROCHELLE ANDRES MRN/PID:           [id number]            Technologist:       Jaja Nelson RVT Accession#:        [id number]        Technologist 2: Date of Birth/Age: 1945 / 78 years Encounter#:         [id number] Gender:            M Admission Status:  Outpatient          Location Performed: [facility]  Diagnosis/ICD: Occlusion and stenosis of bilateral carotid arteries-I65.23 Indication:    Carotid Occlusion/Stenosis w/o infarct CPT Codes:     43522 Cerebrovascular Carotid Duplex scan complete  **CRITICAL RESULT** Critical Result: >70% stenosis right ICA and end diastolic velocities >200 cm/s. Notification called to KATEY Sanchez-CNP on 5/3/2024 at 9:53:48 AM by Jaja Nelson RVT.  CONCLUSIONS: Right Carotid: Velocities in the right ICA are consistent with >70% stenosis, with end diastolic velocities >200 cm/s, may be suggestive of closer to 80% stenosis. There  are elevated velocities in the right ECA that are suggestive of disease. The right vertebral artery is patent with antegrade flow. No evidence of hemodynamically significant stenosis in the right subclavian artery. Left Carotid: Findings are consistent with 50 to 69% stenosis of the left proximal internal carotid artery. Left external carotid artery appears patent with no evidence of stenosis. The left vertebral artery is patent with antegrade flow. There are elevated velocities in the left subclavian artery that are suggestive of disease.  Comparison: Compared with study from 3/8/2018, the right ICA appears to be >70% in today's exam, left is 50-69%.  Imaging & Doppler Findings: Right Plaque Morph: The proximal right internal carotid artery demonstrates heterogenous and calcified plaque. The proximal right external carotid artery demonstrates heterogenous plaque. The mid right common carotid artery demonstrates heterogenous plaque. Left Plaque Morph: The proximal left internal carotid artery demonstrates complex and calcified plaque. The proximal left external carotid artery demonstrates complex and calcified plaque. The distal left common carotid artery demonstrates heterogenous plaque. The proximal left subclavian artery demonstrates heterogenous plaque.   Right                         Left   PSV      EDV                 PSV      EDV 63 cm/s             CCA P    60 cm/s 50 cm/s             CCA D    107 cm/s 707 cm/s 221 cm/s   ICA P    210 cm/s 45 cm/s 174 cm/s 66 cm/s    ICA M    153 cm/s 35 cm/s 71 cm/s  29 cm/s    ICA D    82 cm/s  21 cm/s 291 cm/s             ECA     186 cm/s 60 cm/s   9 cm/s  Vertebral  48 cm/s  11 cm/s 133 cm/s          Subclavian 384 cm/s               Right Left ICA/CCA Ratio 14.1  2.0   [id number] Philip Danielle MD, RPVI Electronically signed by [id number] Danielle MD, RPSASHA on 5/3/2024 at 10:30:22 AM  ** Final **     Lower extremity venous duplex left    Result Date: 4/21/2024  Interpreted  By:  Nilam Russ, STUDY: [facility] US LOWER EXTREMITY VENOUS DUPLEX LEFT;  4/20/2024 8:09 am   INDICATION: Signs/Symptoms:Leg pain and swelling.   COMPARISON: None.   ACCESSION NUMBER(S): [id number]   ORDERING CLINICIAN: JESUSITA BLACKBURN   TECHNIQUE: Grayscale, color and spectral Doppler evaluation of the deep venous system of the  left lower extremity was performed.   FINDINGS: There is normal compressibility of the visualized common femoral, femoral (central, mid, and peripheral aspects, including the saphenofemoral junction), and popliteal veins.  There is intact spontaneous and phasic variation throughout these vessels by spectral Doppler.  Intact compressibility and/or flow within visualized portions of the upper calf deep veins. Superficial soft tissue edema in the visualized calf.   Intact flow and/or symmetric spectral Doppler waveform in the contralateral common femoral vein.       No sonographic evidence for deep vein thrombosis within the evaluated veins of the left lower extremity.   Superficial soft tissue edema in the visualized upper calf.   MACRO: None.   Signed by: Nilam Russ 4/21/2024 1:37 PM Dictation workstation:   [id number]    MR abdomen renal w and wo IV contrast    Result Date: 4/19/2024  Interpreted By:  Blanca Hannon, STUDY: MR ABDOMEN RENAL W AND WO IV CONTRAST;  4/18/2024 10:21 pm   INDICATION: Signs/Symptoms:Abnormal ultrasound.   COMPARISON: Renal ultrasound 04/17/2024, CT abdomen and pelvis 10/31/2022   ACCESSION NUMBER(S): [id number]   ORDERING CLINICIAN: JESUSITA BLACKBURN   TECHNIQUE: MRI renal mass protocol pre and dynamically post contrast. 20 mL of Dotarem were administered intravenously without immediate complication.   FINDINGS: Kidneys: Bilateral 2 cm upper pole simple cysts. No enhancing soft tissue renal masses in either kidney. No hydronephrosis.   Liver: No mass. No hepatic steatosis.   Biliary: No intrahepatic or extrahepatic bile duct dilation. Cholecystectomy.   Spleen: No mass.  No splenomegaly.   Pancreas: No mass or duct dilation.   Adrenals: Normal.   GI tract: No bowel wall thickening or dilation.   Lymph nodes: No lymphadenopathy.   Mesentery/peritoneum: No ascites or fluid collection.   Vasculature: Stable infrarenal abdominal aortic aneurysm measuring 4.2 cm containing noncalcified plaques and chronic dissection flaps.   Bones/Lower chest: No suspicious marrow replacing process.No pleural effusion.       No solid renal mass in the right kidney corresponding to prior ultrasound findings which likely represent normal renal parenchyma.   Bilateral simple renal cysts.   MACRO None   Signed by: Blanca Hannon 4/19/2024 7:13 AM Dictation workstation:   [id number]    Transthoracic Echo (TTE) Complete    Result Date: 4/18/2024            [facility] [address], [place] [id number]             Phone [phone] TRANSTHORACIC ECHOCARDIOGRAM REPORT  Patient Name:     CURTIS LYLE      Reading Physician:  [id number] Harvey Willis MD Study Date:       4/18/2024            Ordering Provider:  [id number] JESUSITA BLACKBURN MRN/PID:          [id number]             Fellow: Accession#:       [id number]         Nurse: Date of           1945 / 78 years  Sonographer:        Teresa Gross RDCS Birth/Age: Gender:           M                    Additional Staff:   Tamera Beckman Student Height:           182.88 cm            Admit Date: Weight:           113.40 kg            Admission Status:   Inpatient - Routine BSA / BMI:        2.34 m2 / 33.91      Department          [facility]                   kg/m2                Location: Blood Pressure: 166 /72 mmHg Study Type:    TRANSTHORACIC ECHO (TTE) COMPLETE Diagnosis/ICD: Atherosclerotic heart disease of native coronary artery without                angina pectoris-I25.10 Indication:    CAD CPT Codes:     Echo Complete w Full Doppler-[id number] Patient History: Pertinent History: HTN.  AAA, carotid stenosis, hypercholesterolemia, CP, SOB. Study Detail: The following Echo studies were performed: 2D, M-Mode, Doppler and               color flow.  PHYSICIAN INTERPRETATION: Left Ventricle: Left ventricular systolic function is normal, with an estimated ejection fraction of 60-65%. There are no regional wall motion abnormalities. The left ventricular cavity size is normal. Spectral Doppler shows a normal pattern of left ventricular diastolic filling. Left Atrium: The left atrium is mildly dilated. Right Ventricle: The right ventricle is normal in size. There is normal right ventriclar wall thickness. There is normal right ventricular global systolic function. Right Atrium: The right atrium is normal in size. Aortic Valve: The aortic valve appears structurally normal. The aortic valve appears tricuspid and non-restricted. There is trivial aortic valve regurgitation. The peak instantaneous gradient of the aortic valve is 7.2 mmHg. The mean gradient of the aortic valve is 3.0 mmHg. Mitral Valve: The mitral valve is normal in structure. There is normal mitral valve leaflet mobility. There is trace mitral valve regurgitation. Tricuspid Valve: The tricuspid valve is structurally normal. There is normal tricuspid valve leaflet mobility. There is mild tricuspid regurgitation. The Doppler estimated RVSP is slightly elevated at 36.4 mmHg. Pulmonic Valve: The pulmonic valve is structurally normal. There is trace pulmonic valve regurgitation. Pericardium: There is no pericardial effusion noted. Aorta: The aortic root is normal. There is no dilatation of the aortic arch. There is mild dilatation of the ascending aorta. There is no dilatation of the aortic root. Pulmonary Artery: The pulmonary artery is normal in size. The tricuspid regurgitant velocity is 2.89 m/s, and with an estimated right atrial pressure of 3 mmHg, the estimated pulmonary artery pressure is borderline elevated with the RVSP at 36.4 mmHg. The  estimated PASP is 36 mmHg. Systemic Veins: The inferior vena cava appears to be of normal size. There is IVC inspiratory collapse greater than 50%.  CONCLUSIONS:  1. Left ventricular systolic function is normal with a 60-65% estimated ejection fraction.  2. Mild tricuspid regurgitation is visualized.  3. Slightly elevated RVSP.  4. The estimated PASP is 36 mmHg. QUANTITATIVE DATA SUMMARY: 2D MEASUREMENTS:                          Normal Ranges: IVSd:          1.04 cm   (0.6-1.1cm) LVPWd:         1.20 cm   (0.6-1.1cm) LVIDd:         4.03 cm   (3.9-5.9cm) LVIDs:         2.83 cm LV Mass Index: 64.6 g/m2 LV % FS        29.8 % LA VOLUME:                               Normal Ranges: LA Vol A4C:        40.4 ml    (22+/-6mL/m2) LA Vol A2C:        71.2 ml LA Vol BP:         58.8 ml LA Vol Index A4C:  17.3ml/m2 LA Vol Index A2C:  30.4 ml/m2 LA Vol Index BP:   25.1 ml/m2 LA Area A4C:       15.2 cm2 LA Area A2C:       22.1 cm2 LA Major Axis A4C: 4.9 cm LA Major Axis A2C: 5.8 cm LA Volume Index:   23.9 ml/m2 LA Vol A4C:        38.6 ml LA Vol A2C:        67.8 ml RA VOLUME BY A/L METHOD:                               Normal Ranges: RA Vol A4C:        29.2 ml    (8.3-19.5ml) RA Vol Index A4C:  12.5 ml/m2 RA Area A4C:       13.2 cm2 RA Major Axis A4C: 5.1 cm M-MODE MEASUREMENTS:                  Normal Ranges: Ao Root: 2.80 cm (2.0-3.7cm) AORTA MEASUREMENTS:                      Normal Ranges: Ao Sinus, d: 3.25 cm (2.1-3.5cm) Asc Ao, d:   3.80 cm (2.1-3.4cm) LV SYSTOLIC FUNCTION BY 2D PLANIMETRY (MOD):                     Normal Ranges: EF-A4C View: 55.6 % (>=55%) LV DIASTOLIC FUNCTION:                        Normal Ranges: MV Peak E:    0.97 m/s (0.7-1.2 m/s) MV Peak A:    1.07 m/s (0.42-0.7 m/s) E/A Ratio:    0.90     (1.0-2.2) MV e'         0.06 m/s (>8.0) MV lateral e' 0.07 m/s MV medial e'  0.06 m/s E/e' Ratio:   14.89    (<8.0) MITRAL VALVE:                 Normal Ranges: MV DT: 213 msec (150-240msec) AORTIC VALVE:                                    Normal Ranges: AoV Vmax:                1.34 m/s (<=1.7m/s) AoV Peak P.2 mmHg (<20mmHg) AoV Mean PG:             3.0 mmHg (1.7-11.5mmHg) LVOT Max Christofer:            1.08 m/s (<=1.1m/s) AoV VTI:                 30.00 cm (18-25cm) LVOT VTI:                25.30 cm LVOT Diameter:           2.10 cm  (1.8-2.4cm) AoV Area, VTI:           2.92 cm2 (2.5-5.5cm2) AoV Area,Vmax:           2.79 cm2 (2.5-4.5cm2) AoV Dimensionless Index: 0.84  RIGHT VENTRICLE: RV Basal 3.56 cm RV Mid   2.41 cm RV Major 7.0 cm TAPSE:   28.1 mm RV s'    0.14 m/s TRICUSPID VALVE/RVSP:                             Normal Ranges: Peak TR Velocity: 2.89 m/s RV Syst Pressure: 36.4 mmHg (< 30mmHg) IVC Diam:         1.82 cm PULMONIC VALVE:                         Normal Ranges: PV Accel Time: 74 msec  (>120ms) PV Max Christofer:    1.1 m/s  (0.6-0.9m/s) PV Max P.5 mmHg  [id number] Harvey Willis MD Electronically signed on 2024 at 12:53:16 PM  ** Final **     US renal complete    Result Date: 2024  Interpreted By:  Joseph Sawyer, STUDY: US RENAL COMPLETE;  2024 6:18 pm   INDICATION: Signs/Symptoms:Renal insufficiency.   COMPARISON: 10/31/2022 abdomen CT   ACCESSION NUMBER(S): [id number]   ORDERING CLINICIAN: JESUSITA BLACKBURN   TECHNIQUE: Multiple images of the kidneys were obtained  .   FINDINGS: RIGHT KIDNEY: The right kidney measures 12.9 in length. There is no hydronephrosis. The echogenicity of the renal cortex is mildly increased.   The superior pole of the kidney has a 2 cm cyst with questionable septation, unchanged.   The midportion of the kidney is a 2.2 x 2.3 x 3 cm mildly hypoechogenic mass versus focal cortical thickening.   LEFT KIDNEY: The left kidney measures 11.6  in length. No hydronephrosis is noted. The echogenicity of the cortex is mildly increased.   The superior pole of the left kidney has a 2.4 cm cyst, previously 2 cm.   BLADDER: Normal.       1. The renal cortical echogenicity is mildly  increased which can be seen with medical type renal disease.   2. The mid right kidney has a 3 cm mildly hypo echogenic collection of tissue which may represent residual normal renal parenchyma; a mass is not excluded however. Enhanced MR may be helpful for further evaluation.   3. Both kidneys have benign cysts which were also noted on the 2022 CT.   MACRO: Critical Finding:  See findings. Notification was initiated on 4/18/2024 at 10:23 am by  Joseph Sawyer.  (**-YCF-**)   Signed by: Joseph Sawyer 4/18/2024 10:23 AM Dictation workstation:   [id number]    XR chest 2 views    Result Date: 4/18/2024  Interpreted By:  Dario Lam, STUDY: XR CHEST 2 VIEWS; 4/17/2024 5:55 pm   INDICATION: Signs/Symptoms:Shortness of breath   COMPARISON: July 2018.   ACCESSION NUMBER(S): [id number]   ORDERING CLINICIAN: JESUSITA BLACKBURN   TECHNIQUE: Number of films: Two-view radiographs of the chest were obtained.   FINDINGS: The cardiac silhouette appears normal. Calcifications involve the tortuous aorta. The lungs are clear. No pleural effusions are seen. The osseous structures are unchanged. Cholecystectomy clips are noted.       No acute cardiopulmonary disease.   Signed by: Dario Lam 4/18/2024 8:11 AM Dictation workstation:   [id number]         Assessment/Plan   Carotid artery stenosis  Hyperlipidemia  Abdominal aortic aneurysm    Carotid artery stenosis-I reviewed the patient's carotid duplex from May 3, 2024 which showed critical carotid stenosis on the right.  Peak systolic velocities 707 cm/s over 221 cm/s in the proximal ICA.  Normally, I would order a CT angiogram, however, the patient has a CKD with a most recent creatinine of 2.1.  Will defer to Dr. Botello for any further workup, but likely patient needs a carotid endarterectomy.  Continue aspirin/statin.    Abdominal aortic aneurysm-patient had a recent MR abdomen which showed a stable infrarenal abdominal aortic aneurysm measuring 4.2 cm.  Patient will need follow-up in  1 year for an aneurysm with abdominal aortic duplex      Aj Ivy, APRN-CNP

## 2024-05-22 ENCOUNTER — APPOINTMENT (OUTPATIENT)
Dept: VASCULAR SURGERY | Facility: CLINIC | Age: 79
End: 2024-05-22
Payer: MEDICARE

## 2024-05-25 ENCOUNTER — LAB (OUTPATIENT)
Dept: LAB | Facility: LAB | Age: 79
End: 2024-05-25
Payer: MEDICARE

## 2024-05-25 ENCOUNTER — OFFICE VISIT (OUTPATIENT)
Dept: PRIMARY CARE | Facility: CLINIC | Age: 79
End: 2024-05-25
Payer: MEDICARE

## 2024-05-25 VITALS
DIASTOLIC BLOOD PRESSURE: 74 MMHG | WEIGHT: 245 LBS | SYSTOLIC BLOOD PRESSURE: 128 MMHG | HEIGHT: 72 IN | BODY MASS INDEX: 33.18 KG/M2

## 2024-05-25 DIAGNOSIS — I87.2 VENOUS INSUFFICIENCY OF BOTH LOWER EXTREMITIES: ICD-10-CM

## 2024-05-25 DIAGNOSIS — I10 HYPERTENSION, UNSPECIFIED TYPE: ICD-10-CM

## 2024-05-25 DIAGNOSIS — L03.115 CELLULITIS OF RIGHT LOWER EXTREMITY: ICD-10-CM

## 2024-05-25 DIAGNOSIS — Z00.00 MEDICARE ANNUAL WELLNESS VISIT, INITIAL: Primary | ICD-10-CM

## 2024-05-25 DIAGNOSIS — L03.116 CELLULITIS OF LEFT LOWER EXTREMITY: ICD-10-CM

## 2024-05-25 DIAGNOSIS — E78.00 HYPERCHOLESTEROLEMIA: ICD-10-CM

## 2024-05-25 DIAGNOSIS — N18.30 CHRONIC RENAL FAILURE, STAGE 3 (MODERATE), UNSPECIFIED WHETHER STAGE 3A OR 3B CKD (MULTI): ICD-10-CM

## 2024-05-25 LAB
ALBUMIN SERPL BCP-MCNC: 3.7 G/DL (ref 3.4–5)
ALP SERPL-CCNC: 94 U/L (ref 33–136)
ALT SERPL W P-5'-P-CCNC: 9 U/L (ref 10–52)
ANION GAP SERPL CALC-SCNC: 12 MMOL/L (ref 10–20)
AST SERPL W P-5'-P-CCNC: 13 U/L (ref 9–39)
BILIRUB SERPL-MCNC: 0.6 MG/DL (ref 0–1.2)
BUN SERPL-MCNC: 26 MG/DL (ref 6–23)
CALCIUM SERPL-MCNC: 8.7 MG/DL (ref 8.6–10.6)
CHLORIDE SERPL-SCNC: 108 MMOL/L (ref 98–107)
CO2 SERPL-SCNC: 25 MMOL/L (ref 21–32)
CREAT SERPL-MCNC: 2.06 MG/DL (ref 0.5–1.3)
EGFRCR SERPLBLD CKD-EPI 2021: 32 ML/MIN/1.73M*2
GLUCOSE SERPL-MCNC: 94 MG/DL (ref 74–99)
POTASSIUM SERPL-SCNC: 4.2 MMOL/L (ref 3.5–5.3)
PROT SERPL-MCNC: 6.9 G/DL (ref 6.4–8.2)
SODIUM SERPL-SCNC: 141 MMOL/L (ref 136–145)

## 2024-05-25 PROCEDURE — 1170F FXNL STATUS ASSESSED: CPT | Performed by: INTERNAL MEDICINE

## 2024-05-25 PROCEDURE — 1160F RVW MEDS BY RX/DR IN RCRD: CPT | Performed by: INTERNAL MEDICINE

## 2024-05-25 PROCEDURE — 36415 COLL VENOUS BLD VENIPUNCTURE: CPT

## 2024-05-25 PROCEDURE — 1159F MED LIST DOCD IN RCRD: CPT | Performed by: INTERNAL MEDICINE

## 2024-05-25 PROCEDURE — G0439 PPPS, SUBSEQ VISIT: HCPCS | Performed by: INTERNAL MEDICINE

## 2024-05-25 PROCEDURE — 3078F DIAST BP <80 MM HG: CPT | Performed by: INTERNAL MEDICINE

## 2024-05-25 PROCEDURE — 80053 COMPREHEN METABOLIC PANEL: CPT

## 2024-05-25 PROCEDURE — 99214 OFFICE O/P EST MOD 30 MIN: CPT | Performed by: INTERNAL MEDICINE

## 2024-05-25 PROCEDURE — 3074F SYST BP LT 130 MM HG: CPT | Performed by: INTERNAL MEDICINE

## 2024-05-25 RX ORDER — TRIAMCINOLONE ACETONIDE 1 MG/G
CREAM TOPICAL 2 TIMES DAILY
Qty: 80 G | Refills: 0 | Status: SHIPPED | OUTPATIENT
Start: 2024-05-25

## 2024-05-25 RX ORDER — CEPHALEXIN 500 MG/1
500 CAPSULE ORAL 3 TIMES DAILY
Qty: 45 CAPSULE | Refills: 0 | Status: SHIPPED | OUTPATIENT
Start: 2024-05-25 | End: 2024-06-08 | Stop reason: SDUPTHER

## 2024-05-25 RX ORDER — BACITRACIN ZINC 500 UNIT/G
OINTMENT (GRAM) TOPICAL 2 TIMES DAILY
Qty: 14 G | Refills: 0 | Status: SHIPPED | OUTPATIENT
Start: 2024-05-25

## 2024-05-25 ASSESSMENT — PATIENT HEALTH QUESTIONNAIRE - PHQ9
2. FEELING DOWN, DEPRESSED OR HOPELESS: NOT AT ALL
SUM OF ALL RESPONSES TO PHQ9 QUESTIONS 1 AND 2: 0
1. LITTLE INTEREST OR PLEASURE IN DOING THINGS: NOT AT ALL

## 2024-05-25 ASSESSMENT — ENCOUNTER SYMPTOMS
DEPRESSION: 0
LOSS OF SENSATION IN FEET: 0
OCCASIONAL FEELINGS OF UNSTEADINESS: 0

## 2024-05-25 ASSESSMENT — ACTIVITIES OF DAILY LIVING (ADL)
DOING_HOUSEWORK: INDEPENDENT
MANAGING_FINANCES: INDEPENDENT
TAKING_MEDICATION: INDEPENDENT
BATHING: INDEPENDENT
GROCERY_SHOPPING: INDEPENDENT
DRESSING: INDEPENDENT

## 2024-05-25 NOTE — PROGRESS NOTES
Subjective   Patient ID: Charles Chan is a 79 y.o. male who presents for Medicare Annual Wellness Visit Initial, leg cellulitis , and Follow-up (renal insufficiency.).    It is always a delight to serve Mr. Chan.  Issues are:  1. Medicare Wellness Visit.  I thanked him for participating.  2. Left leg cellulitis is back.  3. He has an issue with the kidney mass.  He is worried about it.  4. Follow-up on renal insufficiency.    IMMUNIZATION:  Tetanus within the last year.  Pneumonia and shingles reviewed.    HEALTH MAINTENANCE:  Okay with colonoscopy.    I have personally reviewed the patient's Past Medical History, Medications, Allergies, Social History, and Family History in the EMR.    Review of Systems   All other systems reviewed and are negative.  The patient has never had a stroke.  No heart attack.  No cancer.    Objective   /74   Ht 1.829 m (6')   Wt 111 kg (245 lb)   BMI 33.23 kg/m²     Physical Exam  Vitals reviewed.   HENT:      Right Ear: Tympanic membrane, ear canal and external ear normal.      Left Ear: Tympanic membrane, ear canal and external ear normal.   Eyes:      General: No scleral icterus.     Pupils: Pupils are equal, round, and reactive to light.   Neck:      Vascular: No carotid bruit.   Cardiovascular:      Heart sounds: Normal heart sounds, S1 normal and S2 normal. No murmur heard.     No friction rub.      Comments: Dorsalis pedis okay.  Pulmonary:      Effort: Pulmonary effort is normal.      Breath sounds: Normal breath sounds and air entry.   Abdominal:      Palpations: There is no hepatomegaly, splenomegaly or mass.   Genitourinary:     Comments: RECTAL:  Deferred by the patient.  GENITAL:  Deferred by the patient.  Musculoskeletal:         General: No swelling or deformity. Normal range of motion.      Cervical back: Neck supple.      Right lower leg: No edema.      Left lower leg: No edema.   Lymphadenopathy:      Cervical: No cervical adenopathy.      Upper Body:       Right upper body: No axillary adenopathy.      Left upper body: No axillary adenopathy.      Lower Body: No right inguinal adenopathy. No left inguinal adenopathy.   Skin:     Comments: Left leg below-knee, above-ankle cellulitis present.   Neurological:      Mental Status: He is oriented to person, place, and time.      Cranial Nerves: Cranial nerves 2-12 are intact. No cranial nerve deficit.      Sensory: No sensory deficit.      Motor: Motor function is intact. No weakness.      Gait: Gait is intact.      Deep Tendon Reflexes: Reflexes normal.   Psychiatric:         Mood and Affect: Mood normal. Mood is not anxious or depressed. Affect is not angry.         Behavior: Behavior is not agitated.         Thought Content: Thought content normal.         Judgment: Judgment normal.     LAB WORK:  Laboratory testing discussed.  MRI reviewed and told the patient has no solid tumor in the kidneys.    Assessment/Plan   Problem List Items Addressed This Visit             ICD-10-CM       Cardiac and Vasculature    Hypertension I10    Hypercholesterolemia E78.00    Relevant Orders    Comprehensive metabolic panel    Venous insufficiency of both lower extremities I87.2    Relevant Medications    cephalexin (Keflex) 500 mg capsule    triamcinolone (Kenalog) 0.1 % cream    bacitracin 500 unit/gram ointment       Infectious Diseases    Cellulitis L03.90     Other Visit Diagnoses         Codes    Medicare annual wellness visit, initial    -  Primary Z00.00    Chronic renal failure, stage 3 (moderate), unspecified whether stage 3a or 3b CKD (Multi)     N18.30        1. Medicare Wellness Visit, done.  2. The patient is full code.  Wife is legal power of .  The patient is not depressed.  Not suicidal.  3. Immunization, we are monitoring.  4. Cardiac.  The patient is stable.  5. Cellulitis in the legs recurrence.  Keflex given.  Cream given for dermatitis.  6. Chronic renal failure, stage 3 to 4.  Monitor kidney.  7.  Hypertension, okay.  8. High cholesterol, stable.  9. I thanked him for participating in Medicare Wellness.  10. Follow up in a couple of weeks.  11. Colonoscopy in five years.  Monitor.    Gin Attestation  By signing my name below, I, Gin Salazar attest that this documentation has been prepared under the direction and in the presence of Moi Pryor MD.

## 2024-06-04 ENCOUNTER — OFFICE VISIT (OUTPATIENT)
Dept: VASCULAR SURGERY | Facility: CLINIC | Age: 79
End: 2024-06-04
Payer: MEDICARE

## 2024-06-04 VITALS
RESPIRATION RATE: 18 BRPM | HEIGHT: 72 IN | BODY MASS INDEX: 33.18 KG/M2 | DIASTOLIC BLOOD PRESSURE: 80 MMHG | SYSTOLIC BLOOD PRESSURE: 166 MMHG | HEART RATE: 71 BPM | WEIGHT: 245 LBS

## 2024-06-04 DIAGNOSIS — I65.23 CAROTID STENOSIS, BILATERAL: Primary | ICD-10-CM

## 2024-06-04 PROCEDURE — 1159F MED LIST DOCD IN RCRD: CPT | Performed by: SURGERY

## 2024-06-04 PROCEDURE — 99205 OFFICE O/P NEW HI 60 MIN: CPT | Performed by: SURGERY

## 2024-06-04 PROCEDURE — 1036F TOBACCO NON-USER: CPT | Performed by: SURGERY

## 2024-06-04 PROCEDURE — 3079F DIAST BP 80-89 MM HG: CPT | Performed by: SURGERY

## 2024-06-04 PROCEDURE — 99215 OFFICE O/P EST HI 40 MIN: CPT | Performed by: SURGERY

## 2024-06-04 PROCEDURE — 1126F AMNT PAIN NOTED NONE PRSNT: CPT | Performed by: SURGERY

## 2024-06-04 PROCEDURE — 3077F SYST BP >= 140 MM HG: CPT | Performed by: SURGERY

## 2024-06-04 ASSESSMENT — PATIENT HEALTH QUESTIONNAIRE - PHQ9
1. LITTLE INTEREST OR PLEASURE IN DOING THINGS: NOT AT ALL
SUM OF ALL RESPONSES TO PHQ9 QUESTIONS 1 AND 2: 0
2. FEELING DOWN, DEPRESSED OR HOPELESS: NOT AT ALL

## 2024-06-04 ASSESSMENT — PAIN SCALES - GENERAL: PAINLEVEL: 0-NO PAIN

## 2024-06-04 ASSESSMENT — ENCOUNTER SYMPTOMS
LOSS OF SENSATION IN FEET: 0
OCCASIONAL FEELINGS OF UNSTEADINESS: 1
DEPRESSION: 0

## 2024-06-04 ASSESSMENT — VISUAL ACUITY: OU: 1

## 2024-06-04 NOTE — H&P (VIEW-ONLY)
Vascular Surgery Consultation, History, Physical    Charles Chan is a 79 y.o. year old male patient.   History: He is referred for peripheral vascular disease involving his carotid arteries and aorta.  He has a moderate-sized abdominal aortic aneurysm which at most recent study was about 4.8 cm in size.  He has bilateral carotid atherosclerotic disease worse on the right side with a greater than 70% stenosis recorded with an end-diastolic velocity over 200 cm/s.  He is asymptomatic of stroke.  He does suffer fromChronic kidney disease with a creatinine of 2.08.    Past Medical History:   Diagnosis Date    BPH (benign prostatic hyperplasia)     Cellulitis     Chronic renal disease, stage 2, mildly decreased glomerular filtration rate (GFR) between 60-89 mL/min/1.73 square meter     High cholesterol     HTN (hypertension)     Hyperlipemia     Hypertension     Hypothyroid     Overweight     Personal history of other diseases of the circulatory system     History of hypertension    Personal history of other diseases of the digestive system 10/27/2021    History of gastroesophageal reflux (GERD)    Prediabetes        Past Surgical History:   Procedure Laterality Date    APPENDECTOMY  06/13/2013    Appendectomy    COLON SURGERY  06/13/2013    Colon Surgery    GALLBLADDER SURGERY  06/13/2013    Gallbladder Surgery       Active Ambulatory Problems     Diagnosis Date Noted    AAA (abdominal aortic aneurysm) (CMS-HCC) 01/16/2023    Abdominal pain 01/16/2023    Abnormality of glucagon secretion ([facility]) 01/16/2023    Acute infective gastroenteritis 01/16/2023    Anemia 01/16/2023    Aneurysm (CMS-HCC) 01/16/2023    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/16/2023    Bladder pain 01/16/2023    Bursitis of right shoulder 01/16/2023    Carotid stenosis 01/16/2023    Cataract 01/16/2023    Cellulitis 01/16/2023    Chronic reflux esophagitis 01/16/2023    Class 1 obesity with body mass index (BMI) of 31.0 to 31.9 in adult  01/16/2023    Claudication (CMS-HCC) 01/16/2023    Colon polyps 01/16/2023    Dehydration 01/16/2023    Dizziness 01/16/2023    Ear infection 01/16/2023    Elevated blood sugar 01/16/2023    Environmental allergies 01/16/2023    Esophageal reflux 01/16/2023    Eye redness 01/16/2023    Foot pain 01/16/2023    Feeling weak 01/16/2023    Hypertension 01/16/2023    Hypercholesterolemia 01/16/2023    Kidney pain 01/16/2023    Impingement syndrome of shoulder 01/16/2023    Leg pain 01/16/2023    Renal failure 01/16/2023    Pure hypercholesterolemia 01/16/2023    Right shoulder pain 01/16/2023    Sinusitis 01/16/2023    Shortness of breath 01/16/2023    S/P TURP 01/16/2023    Urinary frequency 01/16/2023    UTI (urinary tract infection) 01/16/2023    Vitamin B deficiency 01/16/2023    Vitamin D deficiency 01/16/2023    Chest pain 09/18/2023    Chronic renal failure, stage 2 (mild) 09/18/2023    Prediabetes 09/18/2023    Cellulitis of left leg 04/17/2024    Venous stasis dermatitis of both lower extremities 04/17/2024    Venous insufficiency of both lower extremities 04/17/2024    Stage 3a chronic kidney disease (Multi) 04/17/2024     Resolved Ambulatory Problems     Diagnosis Date Noted    No Resolved Ambulatory Problems     Past Medical History:   Diagnosis Date    BPH (benign prostatic hyperplasia)     Chronic renal disease, stage 2, mildly decreased glomerular filtration rate (GFR) between 60-89 mL/min/1.73 square meter     High cholesterol     HTN (hypertension)     Hyperlipemia     Hypothyroid     Overweight     Personal history of other diseases of the circulatory system     Personal history of other diseases of the digestive system 10/27/2021       Review of Systems   Constitutional: Negative.    HENT: Negative.     Eyes: Negative.    Respiratory: Negative.     Cardiovascular: Negative.    Gastrointestinal: Negative.    Endocrine: Negative.    Genitourinary: Negative.    Musculoskeletal: Negative.    Skin:  Negative.    Allergic/Immunologic: Negative.    Neurological: Negative.    Hematological: Negative.    Psychiatric/Behavioral: Negative.       No Known Allergies    Vitals:   Visit Vitals  /80   Pulse 71   Resp 18     Physical Exam:   Vascular Physical Exam  Constitutional:       General: He is awake.   HENT:      Head: Normocephalic and atraumatic.      Nose: Nose normal.   Eyes:      General: Vision grossly intact.      Pupils: Pupils are equal, round, and reactive to light.   Cardiovascular:      Rate and Rhythm: Normal rate.      Pulses:           Femoral pulses are 2+ on the right side and 2+ on the left side.  Pulmonary:      Effort: Pulmonary effort is normal.   Abdominal:      General: Abdomen is protuberant.      Palpations: Abdomen is soft.   Musculoskeletal:      Neck: Full passive range of motion without pain.   Skin:     General: Skin is warm and dry.   Neurological:      Mental Status: He is alert.      Cranial Nerves: No cranial nerve deficit.      Sensory: No sensory deficit.      Motor: No weakness.   Psychiatric:         Mood and Affect: Mood normal.         Behavior: Behavior normal.         Labs:  LABS:  CBC with Differential:    Lab Results   Component Value Date    WBC 9.0 04/30/2024    RBC 4.44 (L) 04/30/2024    HGB 12.8 (L) 04/30/2024    HCT 39.0 (L) 04/30/2024     04/30/2024    MCV 88 04/30/2024    MCH 28.8 04/30/2024    MCHC 32.8 04/30/2024    RDW 12.4 04/30/2024    NRBC 0.0 04/30/2024    LYMPHOPCT 20.4 04/17/2024    MONOPCT 8.4 04/17/2024    EOSPCT 3.6 04/17/2024    BASOPCT 0.5 04/17/2024    MONOSABS 0.68 04/17/2024    LYMPHSABS 1.66 04/17/2024    EOSABS 0.29 04/17/2024    BASOSABS 0.04 04/17/2024     CMP:    Lab Results   Component Value Date     05/25/2024    K 4.2 05/25/2024     (H) 05/25/2024    CO2 25 05/25/2024    BUN 26 (H) 05/25/2024    CREATININE 2.06 (H) 05/25/2024    GLUCOSE 94 05/25/2024    PROT 6.9 05/25/2024    CALCIUM 8.7 05/25/2024    BILITOT 0.6  "05/25/2024    ALKPHOS 94 05/25/2024    AST 13 05/25/2024    ALT 9 (L) 05/25/2024     BMP:    Lab Results   Component Value Date     05/25/2024    K 4.2 05/25/2024     (H) 05/25/2024    CO2 25 05/25/2024    BUN 26 (H) 05/25/2024    CREATININE 2.06 (H) 05/25/2024    CALCIUM 8.7 05/25/2024    GLUCOSE 94 05/25/2024     Magnesium:  Lab Results   Component Value Date    MG 2.18 09/10/2022     Troponin:  No results found for: \"TROPHS\"  BNP: No results found for: \"BNP\"    Lab Results   Component Value Date    CHOL 144 01/16/2024    LDLF 65 09/11/2023    HDL 46.1 01/16/2024       Imaging: Bedside ultrasound reveals the carotid bifurcation to be surgically accessible.      Assessment/Plan   Diagnoses and all orders for this visit:  Carotid stenosis, bilateral  -     Case Request Operating Room: Endarterectomy Carotid Artery  -     CBC; Future  -     Type And Screen; Future    Risk and rationale for carotid endarterectomy on the right side was discussed and patient is agreeable to proceeding.  Will need a cardiac risk evaluation from Dr. Willis.  "

## 2024-06-04 NOTE — PROGRESS NOTES
Vascular Surgery Consultation, History, Physical    Charles Chan is a 79 y.o. year old male patient.   History: He is referred for peripheral vascular disease involving his carotid arteries and aorta.  He has a moderate-sized abdominal aortic aneurysm which at most recent study was about 4.8 cm in size.  He has bilateral carotid atherosclerotic disease worse on the right side with a greater than 70% stenosis recorded with an end-diastolic velocity over 200 cm/s.  He is asymptomatic of stroke.  He does suffer fromChronic kidney disease with a creatinine of 2.08.    Past Medical History:   Diagnosis Date    BPH (benign prostatic hyperplasia)     Cellulitis     Chronic renal disease, stage 2, mildly decreased glomerular filtration rate (GFR) between 60-89 mL/min/1.73 square meter     High cholesterol     HTN (hypertension)     Hyperlipemia     Hypertension     Hypothyroid     Overweight     Personal history of other diseases of the circulatory system     History of hypertension    Personal history of other diseases of the digestive system 10/27/2021    History of gastroesophageal reflux (GERD)    Prediabetes        Past Surgical History:   Procedure Laterality Date    APPENDECTOMY  06/13/2013    Appendectomy    COLON SURGERY  06/13/2013    Colon Surgery    GALLBLADDER SURGERY  06/13/2013    Gallbladder Surgery       Active Ambulatory Problems     Diagnosis Date Noted    AAA (abdominal aortic aneurysm) (CMS-HCC) 01/16/2023    Abdominal pain 01/16/2023    Abnormality of glucagon secretion ([facility]) 01/16/2023    Acute infective gastroenteritis 01/16/2023    Anemia 01/16/2023    Aneurysm (CMS-HCC) 01/16/2023    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/16/2023    Bladder pain 01/16/2023    Bursitis of right shoulder 01/16/2023    Carotid stenosis 01/16/2023    Cataract 01/16/2023    Cellulitis 01/16/2023    Chronic reflux esophagitis 01/16/2023    Class 1 obesity with body mass index (BMI) of 31.0 to 31.9 in adult  01/16/2023    Claudication (CMS-HCC) 01/16/2023    Colon polyps 01/16/2023    Dehydration 01/16/2023    Dizziness 01/16/2023    Ear infection 01/16/2023    Elevated blood sugar 01/16/2023    Environmental allergies 01/16/2023    Esophageal reflux 01/16/2023    Eye redness 01/16/2023    Foot pain 01/16/2023    Feeling weak 01/16/2023    Hypertension 01/16/2023    Hypercholesterolemia 01/16/2023    Kidney pain 01/16/2023    Impingement syndrome of shoulder 01/16/2023    Leg pain 01/16/2023    Renal failure 01/16/2023    Pure hypercholesterolemia 01/16/2023    Right shoulder pain 01/16/2023    Sinusitis 01/16/2023    Shortness of breath 01/16/2023    S/P TURP 01/16/2023    Urinary frequency 01/16/2023    UTI (urinary tract infection) 01/16/2023    Vitamin B deficiency 01/16/2023    Vitamin D deficiency 01/16/2023    Chest pain 09/18/2023    Chronic renal failure, stage 2 (mild) 09/18/2023    Prediabetes 09/18/2023    Cellulitis of left leg 04/17/2024    Venous stasis dermatitis of both lower extremities 04/17/2024    Venous insufficiency of both lower extremities 04/17/2024    Stage 3a chronic kidney disease (Multi) 04/17/2024     Resolved Ambulatory Problems     Diagnosis Date Noted    No Resolved Ambulatory Problems     Past Medical History:   Diagnosis Date    BPH (benign prostatic hyperplasia)     Chronic renal disease, stage 2, mildly decreased glomerular filtration rate (GFR) between 60-89 mL/min/1.73 square meter     High cholesterol     HTN (hypertension)     Hyperlipemia     Hypothyroid     Overweight     Personal history of other diseases of the circulatory system     Personal history of other diseases of the digestive system 10/27/2021       Review of Systems   Constitutional: Negative.    HENT: Negative.     Eyes: Negative.    Respiratory: Negative.     Cardiovascular: Negative.    Gastrointestinal: Negative.    Endocrine: Negative.    Genitourinary: Negative.    Musculoskeletal: Negative.    Skin:  Negative.    Allergic/Immunologic: Negative.    Neurological: Negative.    Hematological: Negative.    Psychiatric/Behavioral: Negative.       No Known Allergies    Vitals:   Visit Vitals  /80   Pulse 71   Resp 18     Physical Exam:   Vascular Physical Exam  Constitutional:       General: He is awake.   HENT:      Head: Normocephalic and atraumatic.      Nose: Nose normal.   Eyes:      General: Vision grossly intact.      Pupils: Pupils are equal, round, and reactive to light.   Cardiovascular:      Rate and Rhythm: Normal rate.      Pulses:           Femoral pulses are 2+ on the right side and 2+ on the left side.  Pulmonary:      Effort: Pulmonary effort is normal.   Abdominal:      General: Abdomen is protuberant.      Palpations: Abdomen is soft.   Musculoskeletal:      Neck: Full passive range of motion without pain.   Skin:     General: Skin is warm and dry.   Neurological:      Mental Status: He is alert.      Cranial Nerves: No cranial nerve deficit.      Sensory: No sensory deficit.      Motor: No weakness.   Psychiatric:         Mood and Affect: Mood normal.         Behavior: Behavior normal.         Labs:  LABS:  CBC with Differential:    Lab Results   Component Value Date    WBC 9.0 04/30/2024    RBC 4.44 (L) 04/30/2024    HGB 12.8 (L) 04/30/2024    HCT 39.0 (L) 04/30/2024     04/30/2024    MCV 88 04/30/2024    MCH 28.8 04/30/2024    MCHC 32.8 04/30/2024    RDW 12.4 04/30/2024    NRBC 0.0 04/30/2024    LYMPHOPCT 20.4 04/17/2024    MONOPCT 8.4 04/17/2024    EOSPCT 3.6 04/17/2024    BASOPCT 0.5 04/17/2024    MONOSABS 0.68 04/17/2024    LYMPHSABS 1.66 04/17/2024    EOSABS 0.29 04/17/2024    BASOSABS 0.04 04/17/2024     CMP:    Lab Results   Component Value Date     05/25/2024    K 4.2 05/25/2024     (H) 05/25/2024    CO2 25 05/25/2024    BUN 26 (H) 05/25/2024    CREATININE 2.06 (H) 05/25/2024    GLUCOSE 94 05/25/2024    PROT 6.9 05/25/2024    CALCIUM 8.7 05/25/2024    BILITOT 0.6  "05/25/2024    ALKPHOS 94 05/25/2024    AST 13 05/25/2024    ALT 9 (L) 05/25/2024     BMP:    Lab Results   Component Value Date     05/25/2024    K 4.2 05/25/2024     (H) 05/25/2024    CO2 25 05/25/2024    BUN 26 (H) 05/25/2024    CREATININE 2.06 (H) 05/25/2024    CALCIUM 8.7 05/25/2024    GLUCOSE 94 05/25/2024     Magnesium:  Lab Results   Component Value Date    MG 2.18 09/10/2022     Troponin:  No results found for: \"TROPHS\"  BNP: No results found for: \"BNP\"    Lab Results   Component Value Date    CHOL 144 01/16/2024    LDLF 65 09/11/2023    HDL 46.1 01/16/2024       Imaging: Bedside ultrasound reveals the carotid bifurcation to be surgically accessible.      Assessment/Plan   Diagnoses and all orders for this visit:  Carotid stenosis, bilateral  -     Case Request Operating Room: Endarterectomy Carotid Artery  -     CBC; Future  -     Type And Screen; Future    Risk and rationale for carotid endarterectomy on the right side was discussed and patient is agreeable to proceeding.  Will need a cardiac risk evaluation from Dr. Willis.  "

## 2024-06-08 ENCOUNTER — OFFICE VISIT (OUTPATIENT)
Dept: PRIMARY CARE | Facility: CLINIC | Age: 79
End: 2024-06-08
Payer: MEDICARE

## 2024-06-08 VITALS
DIASTOLIC BLOOD PRESSURE: 74 MMHG | SYSTOLIC BLOOD PRESSURE: 130 MMHG | HEIGHT: 72 IN | WEIGHT: 247 LBS | BODY MASS INDEX: 33.46 KG/M2

## 2024-06-08 DIAGNOSIS — I87.2 VENOUS STASIS DERMATITIS, UNSPECIFIED LATERALITY: ICD-10-CM

## 2024-06-08 DIAGNOSIS — L03.115 CELLULITIS OF RIGHT LOWER EXTREMITY: ICD-10-CM

## 2024-06-08 DIAGNOSIS — E78.00 HYPERCHOLESTEROLEMIA: ICD-10-CM

## 2024-06-08 DIAGNOSIS — N18.30 CHRONIC RENAL FAILURE, STAGE 3 (MODERATE), UNSPECIFIED WHETHER STAGE 3A OR 3B CKD (MULTI): ICD-10-CM

## 2024-06-08 DIAGNOSIS — E66.3 OVERWEIGHT: ICD-10-CM

## 2024-06-08 DIAGNOSIS — I10 HYPERTENSION, UNSPECIFIED TYPE: ICD-10-CM

## 2024-06-08 DIAGNOSIS — L03.116 CELLULITIS OF LEFT LOWER EXTREMITY: Primary | ICD-10-CM

## 2024-06-08 DIAGNOSIS — I87.2 VENOUS INSUFFICIENCY OF BOTH LOWER EXTREMITIES: ICD-10-CM

## 2024-06-08 PROCEDURE — 3078F DIAST BP <80 MM HG: CPT | Performed by: INTERNAL MEDICINE

## 2024-06-08 PROCEDURE — 99214 OFFICE O/P EST MOD 30 MIN: CPT | Performed by: INTERNAL MEDICINE

## 2024-06-08 PROCEDURE — 1159F MED LIST DOCD IN RCRD: CPT | Performed by: INTERNAL MEDICINE

## 2024-06-08 PROCEDURE — 3075F SYST BP GE 130 - 139MM HG: CPT | Performed by: INTERNAL MEDICINE

## 2024-06-08 RX ORDER — CEPHALEXIN 500 MG/1
500 CAPSULE ORAL 3 TIMES DAILY
Qty: 45 CAPSULE | Refills: 0 | Status: SHIPPED | OUTPATIENT
Start: 2024-06-08

## 2024-06-08 ASSESSMENT — ENCOUNTER SYMPTOMS
LOSS OF SENSATION IN FEET: 0
OCCASIONAL FEELINGS OF UNSTEADINESS: 0
DEPRESSION: 0

## 2024-06-08 NOTE — PROGRESS NOTES
Subjective   Patient ID: Charles Chan is a 79 y.o. male who presents for cellulitis legs.    It is always a delight to serve Mr. Chan.  He came here for follow-up on the left  leg.  Left leg has more cellulitis on the inner side.  It was red, inflamed, tender almost velvety with bleb.  Other side is okay on the same leg.  Right leg is much better.  Overall, he is feeling good.  Appetite and weight are okay.  No problem.  He came here for follow-up on various conditions.    I have personally reviewed the patient's Past Medical History, Medications, Allergies, Social History, and Family History in the EMR.    Review of Systems   All other systems reviewed and are negative.    Objective   /74   Ht 1.829 m (6')   Wt 112 kg (247 lb)   BMI 33.50 kg/m²     Physical Exam  Vitals reviewed.   Cardiovascular:      Heart sounds: Normal heart sounds, S1 normal and S2 normal. No murmur heard.     No friction rub.   Pulmonary:      Effort: Pulmonary effort is normal.      Breath sounds: Normal breath sounds and air entry.   Abdominal:      Palpations: There is no hepatomegaly, splenomegaly or mass.   Musculoskeletal:      Right lower leg: No edema.      Left lower leg: No edema.   Lymphadenopathy:      Lower Body: No right inguinal adenopathy. No left inguinal adenopathy.   Skin:     Comments: Left leg below-knee, above-ankle medial side red, inflamed and tender.   Neurological:      Cranial Nerves: Cranial nerves 2-12 are intact.      Sensory: No sensory deficit.      Motor: Motor function is intact.      Deep Tendon Reflexes: Reflexes are normal and symmetric.     LAB WORK:  Laboratory testing discussed.    Assessment/Plan   Problem List Items Addressed This Visit             ICD-10-CM       Cardiac and Vasculature    Hypertension I10    Hypercholesterolemia E78.00    Relevant Orders    Basic metabolic panel    Venous insufficiency of both lower extremities I87.2    Relevant Medications    cephalexin (Keflex) 500  mg capsule       Infectious Diseases    Cellulitis - Primary L03.90     Other Visit Diagnoses         Codes    Venous stasis dermatitis, unspecified laterality     I87.2    Chronic renal failure, stage 3 (moderate), unspecified whether stage 3a or 3b CKD (Multi)     N18.30    Overweight     E66.3        1. Cellulitis in the legs, stasis dermatitis.  Soak in a hot water, cream.  I think it is fair to put Keflex.  2. Chronic renal failure, stage 3, stable.  Monitor.  3. Hypertension, okay.  4. High cholesterol, stable.  5. Overweight.  I continued to urge him to lose more weight.  6. Follow-up appointment with me in two weeks.    Scribe Attestation  By signing my name below, I, Gin Salazar attest that this documentation has been prepared under the direction and in the presence of Moi Pryor MD.

## 2024-06-10 ENCOUNTER — TELEPHONE (OUTPATIENT)
Dept: CARDIOLOGY | Facility: CLINIC | Age: 79
End: 2024-06-10
Payer: MEDICARE

## 2024-06-10 PROBLEM — I65.23 CAROTID STENOSIS, BILATERAL: Status: ACTIVE | Noted: 2024-06-04

## 2024-06-18 ENCOUNTER — LAB (OUTPATIENT)
Dept: LAB | Facility: LAB | Age: 79
End: 2024-06-18
Payer: MEDICARE

## 2024-06-18 ENCOUNTER — PRE-ADMISSION TESTING (OUTPATIENT)
Dept: PREADMISSION TESTING | Facility: HOSPITAL | Age: 79
DRG: 038 | End: 2024-06-18
Payer: MEDICARE

## 2024-06-18 VITALS
HEART RATE: 84 BPM | OXYGEN SATURATION: 99 % | BODY MASS INDEX: 33.03 KG/M2 | TEMPERATURE: 97.3 F | WEIGHT: 243.9 LBS | HEIGHT: 72 IN | RESPIRATION RATE: 18 BRPM | SYSTOLIC BLOOD PRESSURE: 157 MMHG | DIASTOLIC BLOOD PRESSURE: 75 MMHG

## 2024-06-18 DIAGNOSIS — Z51.89 ENCOUNTER FOR OTHER SPECIFIED AFTERCARE: ICD-10-CM

## 2024-06-18 DIAGNOSIS — I65.23 CAROTID STENOSIS, BILATERAL: ICD-10-CM

## 2024-06-18 DIAGNOSIS — Z01.818 PREOP TESTING: ICD-10-CM

## 2024-06-18 DIAGNOSIS — Z01.818 PREOP TESTING: Primary | ICD-10-CM

## 2024-06-18 DIAGNOSIS — E78.00 HYPERCHOLESTEROLEMIA: ICD-10-CM

## 2024-06-18 LAB
ABO GROUP (TYPE) IN BLOOD: NORMAL
ANION GAP SERPL CALC-SCNC: 11 MMOL/L
ANTIBODY SCREEN: NORMAL
APTT PPP: 25.7 SECONDS (ref 22–32.5)
BUN SERPL-MCNC: 29 MG/DL (ref 8–25)
CALCIUM SERPL-MCNC: 8.9 MG/DL (ref 8.5–10.4)
CHLORIDE SERPL-SCNC: 108 MMOL/L (ref 97–107)
CO2 SERPL-SCNC: 21 MMOL/L (ref 24–31)
CREAT SERPL-MCNC: 2.4 MG/DL (ref 0.4–1.6)
EGFRCR SERPLBLD CKD-EPI 2021: 26 ML/MIN/1.73M*2
ERYTHROCYTE [DISTWIDTH] IN BLOOD BY AUTOMATED COUNT: 12.3 % (ref 11.5–14.5)
GLUCOSE SERPL-MCNC: 101 MG/DL (ref 65–99)
HCT VFR BLD AUTO: 39.9 % (ref 41–52)
HGB BLD-MCNC: 13.1 G/DL (ref 13.5–17.5)
INR PPP: 1 (ref 0.9–1.2)
MCH RBC QN AUTO: 28.9 PG (ref 26–34)
MCHC RBC AUTO-ENTMCNC: 32.8 G/DL (ref 32–36)
MCV RBC AUTO: 88 FL (ref 80–100)
NRBC BLD-RTO: 0 /100 WBCS (ref 0–0)
PLATELET # BLD AUTO: 265 X10*3/UL (ref 150–450)
POTASSIUM SERPL-SCNC: 4.1 MMOL/L (ref 3.4–5.1)
PROTHROMBIN TIME: 10.6 SECONDS (ref 9.3–12.7)
RBC # BLD AUTO: 4.54 X10*6/UL (ref 4.5–5.9)
RH FACTOR (ANTIGEN D): NORMAL
SODIUM SERPL-SCNC: 140 MMOL/L (ref 133–145)
WBC # BLD AUTO: 8.6 X10*3/UL (ref 4.4–11.3)

## 2024-06-18 PROCEDURE — 86850 RBC ANTIBODY SCREEN: CPT

## 2024-06-18 PROCEDURE — 85610 PROTHROMBIN TIME: CPT

## 2024-06-18 PROCEDURE — 80048 BASIC METABOLIC PNL TOTAL CA: CPT

## 2024-06-18 PROCEDURE — 93005 ELECTROCARDIOGRAM TRACING: CPT

## 2024-06-18 PROCEDURE — 87081 CULTURE SCREEN ONLY: CPT | Mod: WESLAB | Performed by: NURSE PRACTITIONER

## 2024-06-18 PROCEDURE — 85027 COMPLETE CBC AUTOMATED: CPT

## 2024-06-18 PROCEDURE — 86900 BLOOD TYPING SEROLOGIC ABO: CPT

## 2024-06-18 PROCEDURE — 86901 BLOOD TYPING SEROLOGIC RH(D): CPT

## 2024-06-18 PROCEDURE — 85730 THROMBOPLASTIN TIME PARTIAL: CPT

## 2024-06-18 PROCEDURE — 36415 COLL VENOUS BLD VENIPUNCTURE: CPT

## 2024-06-18 RX ORDER — CHLORHEXIDINE GLUCONATE ORAL RINSE 1.2 MG/ML
SOLUTION DENTAL
Qty: 473 ML | Refills: 0 | Status: SHIPPED | OUTPATIENT
Start: 2024-06-18 | End: 2024-06-23 | Stop reason: HOSPADM

## 2024-06-18 ASSESSMENT — ENCOUNTER SYMPTOMS
EYES NEGATIVE: 1
RESPIRATORY NEGATIVE: 1
CARDIOVASCULAR NEGATIVE: 1
GASTROINTESTINAL NEGATIVE: 1
DIZZINESS: 0
HEMATOLOGIC/LYMPHATIC NEGATIVE: 1
CONSTITUTIONAL NEGATIVE: 1
NEUROLOGICAL NEGATIVE: 1
PSYCHIATRIC NEGATIVE: 1
MUSCULOSKELETAL NEGATIVE: 1

## 2024-06-18 ASSESSMENT — DUKE ACTIVITY SCORE INDEX (DASI)
CAN YOU PARTICIPATE IN MODERATE RECREATIONAL ACTIVITIES LIKE GOLF, BOWLING, DANCING, DOUBLES TENNIS OR THROWING A BASEBALL OR FOOTBALL: NO
CAN YOU HAVE SEXUAL RELATIONS: YES
CAN YOU WALK INDOORS, SUCH AS AROUND YOUR HOUSE: YES
DASI METS SCORE: 6.3
TOTAL_SCORE: 28.7
CAN YOU DO MODERATE WORK AROUND THE HOUSE LIKE VACUUMING, SWEEPING FLOORS OR CARRYING GROCERIES: YES
CAN YOU WALK A BLOCK OR TWO ON LEVEL GROUND: YES
CAN YOU RUN A SHORT DISTANCE: NO
CAN YOU PARTICIPATE IN STRENOUS SPORTS LIKE SWIMMING, SINGLES TENNIS, FOOTBALL, BASKETBALL, OR SKIING: NO
CAN YOU DO LIGHT WORK AROUND THE HOUSE LIKE DUSTING OR WASHING DISHES: YES
CAN YOU DO YARD WORK LIKE RAKING LEAVES, WEEDING OR PUSHING A MOWER: YES
CAN YOU DO HEAVY WORK AROUND THE HOUSE LIKE SCRUBBING FLOORS OR LIFTING AND MOVING HEAVY FURNITURE: NO
CAN YOU CLIMB A FLIGHT OF STAIRS OR WALK UP A HILL: YES
CAN YOU TAKE CARE OF YOURSELF (EAT, DRESS, BATHE, OR USE TOILET): YES

## 2024-06-18 ASSESSMENT — PAIN SCALES - GENERAL: PAINLEVEL_OUTOF10: 0 - NO PAIN

## 2024-06-18 ASSESSMENT — PAIN - FUNCTIONAL ASSESSMENT: PAIN_FUNCTIONAL_ASSESSMENT: 0-10

## 2024-06-18 NOTE — CPM/PAT H&P
CPM/PAT Evaluation       Name: Charles Chan (Charles Chan)  /Age: 1945/79 y.o.     In-Person       Chief Complaint: carotid stenosis    HPI    Pt is  an 79 year old male with carotid stenosis. Pt was accompanied to his PAT  visit by his wife.  Pt reports his cardiologist had him complete and  US of his carotids that showed:    Right Carotid: Velocities in the right ICA are consistent with >70% stenosis, with end diastolic velocities >200 cm/s, may be suggestive of closer to 80% stenosis. There are elevated velocities in the right ECA that are suggestive of disease. The right vertebral artery is patent with antegrade flow. No evidence of hemodynamically significant stenosis in the right subclavian artery.  Left Carotid: Findings are consistent with 50 to 69% stenosis of the left proximal internal carotid artery. Left external carotid artery appears patent with no evidence of stenosis. The left vertebral artery is patent with antegrade flow. There are elevated velocities in the left subclavian artery that are suggestive of disease.    Pt denies dizziness, light headedness,  headaches, or confusion. Pt was examined by his vascular  surgeon and has been scheduled for a  right carotid endarterectomy. Pt denies CP, SOB, or dizziness. Pt denies stroke or TIA.     Past Medical History:   Diagnosis Date    AAA (abdominal aortic aneurysm) (CMS-HCC)     Anemia     BPH (benign prostatic hyperplasia)     Carotid stenosis     Cellulitis     left leg    Venous stasis  dermatitis bilateral legs     CKD (chronic kidney disease), stage III (Multi)     Coronary artery disease     Environmental allergies     GERD (gastroesophageal reflux disease)     High cholesterol     HTN (hypertension)     Hyperlipemia     Hypertension          Overweight     Personal history of other diseases of the digestive system 10/27/2021    History of gastroesophageal reflux (GERD)    Prediabetes     Venous insufficiency of both lower  extremities        Past Surgical History:   Procedure Laterality Date    APPENDECTOMY  06/13/2013    Appendectomy    CARDIAC CATHETERIZATION  2008    2 stents placed    COLON SURGERY  06/13/2013    partial colectomy for diverticular disease    CORONARY ANGIOPLASTY      GALLBLADDER SURGERY  06/13/2013    Gallbladder Surgery     Patient Sexual activity questions deferred to the physician.    Family History   Problem Relation Name Age of Onset    Hypertension Mother      Heart disease Mother      Stroke Father      Diabetes Other      Hypertension Other       No Known Allergies    Current Outpatient Medications   Medication Sig Dispense Refill    alfuzosin (Uroxatral) 10 mg 24 hr tablet TAKE 1 TABLET BY MOUTH EVERY DAY 90 tablet 1    amLODIPine (Norvasc) 10 mg tablet TAKE 1 TABLET BY MOUTH EVERY DAY 90 tablet 0    ammonium lactate (Lac-Hydrin) 12 % lotion Apply and rub in a thin film to affected areas twice daily (am and pm)      aspirin 325 mg tablet Take 1 tablet (325 mg) by mouth once daily.      bacitracin 500 unit/gram ointment Apply topically 2 times a day. 14 g 0    cephalexin (Keflex) 500 mg capsule Take 1 capsule (500 mg) by mouth 3 times a day. 45 capsule 0    cholecalciferol (Vitamin D-3) 5,000 Units tablet TAKE 1 TABLET BY MOUTH EVERY DAY 90 tablet 2    clopidogrel (Plavix) 75 mg tablet TAKE 1 TABLET BY MOUTH EVERY DAY 90 tablet 0    cyanocobalamin, vitamin B-12, (Vitamin B-12) 1,000 mcg tablet extended release Take 1 tablet (1,000 mcg) by mouth once daily. As directed      famotidine (Pepcid) 20 mg tablet TAKE 1 TABLET BY MOUTH TWICE A  tablet 0    finasteride (Proscar) 5 mg tablet TAKE 1 TABLET BY MOUTH EVERY DAY 90 tablet 0    fluticasone (Flonase) 50 mcg/actuation nasal spray USE 1 SPRAY EVERY DAY IN EACH NOSTRIL *LASTS 60 DAYS* 48 mL 0    metoprolol succinate XL (Toprol-XL) 25 mg 24 hr tablet Take 1 tablet (25 mg) by mouth once daily. DO NOT CRUSH OR CHEW 90 tablet 1    pantoprazole (ProtoNix)  "40 mg EC tablet TAKE 1 TABLET BY MOUTH ONCE DAILY IN THE MORNING BEFORE A MEAL. DO NOT CRUSH, CHEW OR SPLIT 90 tablet 0    rosuvastatin (Crestor) 40 mg tablet Take 1 tablet (40 mg) by mouth once daily. 90 tablet 1    triamcinolone (Kenalog) 0.1 % cream APPLY TO AFFECTED AREA TWICE A DAY 80 g 0    chlorhexidine (Peridex) 0.12 % solution Use as directed. 473 mL 0    econazole nitrate 1 % cream APPLY TO AFFECTED AREA EVERY DAY AS DIRECTED *30 DAY SUPPLY* 85 g 0            No current facility-administered medications for this visit.     Review of Systems   Constitutional: Negative.    HENT: Negative.     Eyes: Negative.    Respiratory: Negative.     Cardiovascular: Negative.    Gastrointestinal: Negative.    Genitourinary: Negative.    Musculoskeletal: Negative.    Skin:         Recent left lower leg cellulitis   Neurological: Negative.  Negative for dizziness.   Hematological: Negative.    Psychiatric/Behavioral: Negative.       /75   Pulse 84   Temp 36.3 °C (97.3 °F) (Temporal)   Resp 18   Ht 1.829 m (6' 0.01\")   Wt 111 kg (243 lb 14.4 oz)   SpO2 99%   BMI 33.07 kg/m²     Physical Exam  Vitals reviewed.   Constitutional:       Appearance: He is obese.   HENT:      Head: Normocephalic and atraumatic.      Nose: Nose normal.      Mouth/Throat:      Mouth: Mucous membranes are moist.      Pharynx: Oropharynx is clear.   Eyes:      Extraocular Movements: Extraocular movements intact.      Conjunctiva/sclera: Conjunctivae normal.      Pupils: Pupils are equal, round, and reactive to light.   Neck:      Vascular: Carotid bruit (right) present.   Cardiovascular:      Rate and Rhythm: Normal rate and regular rhythm.      Pulses: Normal pulses.      Heart sounds: Normal heart sounds.   Pulmonary:      Effort: Pulmonary effort is normal.      Breath sounds: Normal breath sounds.   Abdominal:      General: Bowel sounds are normal.      Palpations: Abdomen is soft.   Musculoskeletal:      Cervical back: Normal range " of motion and neck supple.      Right lower leg: Edema present.      Left lower leg: Edema present.   Skin:     General: Skin is warm and dry.      Findings: Erythema (mild erythema noted to left lower leg) present.      Comments: Non tender, mildly erythematous raised skin noted to left lower leg.  No drainage noted to skin on  left lower leg.   Neurological:      General: No focal deficit present.      Mental Status: He is alert and oriented to person, place, and time. Mental status is at baseline.   Psychiatric:         Mood and Affect: Mood normal.         Behavior: Behavior normal.         Thought Content: Thought content normal.         Judgment: Judgment normal.        PAT AIRWAY:   Airway:     Mallampati::  II    TM distance::  >3 FB    Neck ROM::  Full   upper dentures and lower dentures    ASA: 3  DASI: 28.7  METS: 6.3  CHADS: 4%  RCRI: 6.6%  STOP BAN  Assessment and Plan:     Carotid stenosis bilateral: endarterectomy carotid artery right.  HTN: pt is taking amlodipine metoprolol succinate XL  CAD: cardiac cath with placement of drug eluting stent to the mid and distal LCX and proximal RCA May 18, 2009 Pt is followed by Dr Willis. Pt is on Plavix.  Hyperlipidemia: Pt is taking rosuvastatin.   AAA:  2024 AAA: 4.2 on MRI  of abdomen.   Left leg cellulitis: hospitalized in April for left leg cellulitis. Pt continues to take Keflex. Pt reports his  left leg is improved since the hospitalization. Pt denies drainage from his left leg and pain. Pt continues to be monitored by his PCP.  Venous stasis dermatitis bilateral lower legs.   BPH: pt is controlled on alfuzisin and finasteride.   CKD stage 3: CMP checked on 2024. Creatinine 2.06.  GERD: Pt is taking pantoprazole and pepcid.   BMI: 33.07    EKG completed in PAT.  CBC and T&S completed in PAT.    Dr Willis cardiac clearance scanned into EPIC media.      2024 ECHO:  CONCLUSIONS:   1. Left ventricular systolic function is normal with a  60-65% estimated ejection fraction.   2. Mild tricuspid regurgitation is visualized.   3. Slightly elevated RVSP.   4. The estimated PASP is 36 mmHg.    KATEY Gardiner-MAGO    Addendum:  Dr Willis reviewed and confirmed EKG that was performed in [facility] on 6/18/2024. Confirmed EKG was scanned into Epic media.   KATEY Gardiner-CNP

## 2024-06-18 NOTE — PREPROCEDURE INSTRUCTIONS
Medication List            Accurate as of June 18, 2024  3:58 PM. Always use your most recent med list.                alfuzosin 10 mg 24 hr tablet  Commonly known as: Uroxatral  TAKE 1 TABLET BY MOUTH EVERY DAY  Medication Adjustments for Surgery: Take morning of surgery with sip of water, no other fluids     amLODIPine 10 mg tablet  Commonly known as: Norvasc  TAKE 1 TABLET BY MOUTH EVERY DAY  Medication Adjustments for Surgery: Take morning of surgery with sip of water, no other fluids     ammonium lactate 12 % lotion  Commonly known as: Lac-Hydrin  Medication Adjustments for Surgery: Other (Comment)  Notes to patient: CAN USE IF NEEDED     aspirin 325 mg tablet  Medication Adjustments for Surgery: Take morning of surgery with sip of water, no other fluids  Notes to patient: PER DR. JAMES'S INSTRUCTIONS     bacitracin 500 unit/gram ointment  Apply topically 2 times a day.  Medication Adjustments for Surgery: Other (Comment)  Notes to patient: CAN USE IF NEEDED     cephalexin 500 mg capsule  Commonly known as: Keflex  Take 1 capsule (500 mg) by mouth 3 times a day.  Medication Adjustments for Surgery: Take morning of surgery with sip of water, no other fluids     chlorhexidine 0.12 % solution  Commonly known as: Peridex  Use as directed.     cholecalciferol 5,000 Units tablet  Commonly known as: Vitamin D-3  TAKE 1 TABLET BY MOUTH EVERY DAY  Medication Adjustments for Surgery: Other (Comment)  Notes to patient: HOLD UNTIL AFTER SURGERY     clopidogrel 75 mg tablet  Commonly known as: Plavix  TAKE 1 TABLET BY MOUTH EVERY DAY  Medication Adjustments for Surgery: Take morning of surgery with sip of water, no other fluids  Notes to patient: PER DR. JAMES'S INSTRUCTIONS     cyanocobalamin (vitamin B-12) 1,000 mcg tablet extended release  Commonly known as: Vitamin B-12  Medication Adjustments for Surgery: Other (Comment)  Notes to patient: HOLD UNTIL AFTER SURGERY     econazole nitrate 1 % cream  APPLY TO AFFECTED  AREA EVERY DAY AS DIRECTED *30 DAY SUPPLY*     famotidine 20 mg tablet  Commonly known as: Pepcid  TAKE 1 TABLET BY MOUTH TWICE A DAY  Medication Adjustments for Surgery: Take morning of surgery with sip of water, no other fluids     finasteride 5 mg tablet  Commonly known as: Proscar  TAKE 1 TABLET BY MOUTH EVERY DAY  Medication Adjustments for Surgery: Take morning of surgery with sip of water, no other fluids     fluticasone 50 mcg/actuation nasal spray  Commonly known as: Flonase  USE 1 SPRAY EVERY DAY IN EACH NOSTRIL *LASTS 60 DAYS*  Medication Adjustments for Surgery: Other (Comment)  Notes to patient: CAN USE IF NEEDED     metoprolol succinate XL 25 mg 24 hr tablet  Commonly known as: Toprol-XL  Take 1 tablet (25 mg) by mouth once daily. DO NOT CRUSH OR CHEW  Medication Adjustments for Surgery: Take morning of surgery with sip of water, no other fluids     pantoprazole 40 mg EC tablet  Commonly known as: ProtoNix  TAKE 1 TABLET BY MOUTH ONCE DAILY IN THE MORNING BEFORE A MEAL. DO NOT CRUSH, CHEW OR SPLIT  Medication Adjustments for Surgery: Take morning of surgery with sip of water, no other fluids     rosuvastatin 40 mg tablet  Commonly known as: Crestor  Take 1 tablet (40 mg) by mouth once daily.  Medication Adjustments for Surgery: Other (Comment)  Notes to patient: CAN TAKE THE NIGHT BEFORE SURGERY     Serevent Diskus 50 mcg/dose diskus inhaler  Generic drug: salmeterol  Inhale 1 puff 2 times a day.  Notes to patient: NOT TAKING     triamcinolone 0.1 % cream  Commonly known as: Kenalog  APPLY TO AFFECTED AREA TWICE A DAY  Medication Adjustments for Surgery: Other (Comment)  Notes to patient: CAN USE IF NEEDED                Preoperative Fasting Guidelines    Why must I stop eating and drinking near surgery time?  With sedation, food or liquid in your stomach can enter your lungs causing serious complications  Increases nausea and vomiting    When do I need to stop eating and drinking before my  surgery?  Do not eat any food or drink any liquids after midnight the night before your surgery/procedure.  You may have sips of water to take medications.    PAT DISCHARGE INSTRUCTIONS    Please call the Same Day Surgery (SDS) Department of the hospital where your procedure will be performed after 2:00 PM the day before your surgery. If you are scheduled on a Monday, or a Tuesday following a Monday holiday, you will need to call on the last business day prior to your surgery.    [facility]  [id number] [place] [address], [address] [phone]  [facility]  [address] [place] [address] [phone]  [facility]  [address]  [place] [id number] [phone]    Please let your surgeon know if:      You develop any open sores, shingles, burning or painful urination as these may increase your risk of an infection.   You no longer wish to have the surgery.   Any other personal circumstances change that may lead to the need to cancel or defer this surgery-such as being sick or getting admitted to any hospital within one week of your planned procedure.    Your contact details change, such as a change of address or phone number.    Starting now:     Please DO NOT drink alcohol or smoke for 24 hours before surgery. It is well known that quitting smoking can make a huge difference to your health and recovery from surgery. The longer you abstain from smoking, the better your chances of a healthy recovery. If you need help with quitting, call 5-800QUIT-NOW to be connected to a trained counselor who will discuss the best methods to help you quit.     Before your surgery:    Please stop all supplements 7 days prior to surgery. Or as directed by your surgeon.   Please stop taking NSAID pain medicine such as Advil and Motrin 7 days before surgery.    If you develop any fever,  cough, cold, rashes, cuts, scratches, scrapes, urinary symptoms or infection anywhere on your body (including teeth and gums) prior to surgery, please call your surgeon’s office as soon as possible. This may require treatment to reduce the chance of cancellation on the day of surgery.    The day before your surgery:   DIET- Please follow the diet instructions at the top of your packet.   Get a good night’s rest.  Use the special soap for bathing if you have been instructed to use one.    Scheduled surgery times may change and you will be notified if this occurs - please check your personal voicemail for any updates.     On the morning of surgery:   Wear comfortable, loose fitting clothes which open in the front. Please do not wear moisturizers, creams, lotions, makeup or perfume.    Please bring with you to surgery:   Photo ID and insurance card   Current list of medicines and allergies   Pacemaker/ Defibrillator/Heart stent cards   CPAP machine and mask    Slings/ splints/ crutches   A copy of your complete advanced directive/DHPOA.    Please do NOT bring with you to surgery:   All jewelry and valuables should be left at home.   Prosthetic devices such as contact lenses, hearing aids, dentures, eyelash extensions, hairpins and body piercings must be removed prior to going in to the surgical suite.    After outpatient surgery:   A responsible adult MUST accompany you at the time of discharge and stay with you for 24 hours after your surgery. You may NOT drive yourself home after surgery.    Do not drive, operate machinery, make critical decisions or do activities that require co-ordination or balance until after a night’s sleep.   Do not drink alcoholic beverages for 24 hours.   Instructions for resuming your medications will be provided by your surgeon.    CALL YOUR DOCTOR AFTER SURGERY IF YOU HAVE:     Chills and/or a fever of 101° F or higher.    Redness, swelling, pus or drainage from your surgical wound or a  bad smell from the wound.    Lightheadedness, fainting or confusion.    Persistent vomiting (throwing up) and are not able to eat or drink for 12 hours.    Three or more loose, watery bowel movements in 24 hours (diarrhea).   Difficulty or pain while urinating( after non-urological surgery)    Pain and swelling in your legs, especially if it is only on one side.    Difficulty breathing or are breathing faster than normal.    Any new concerning symptoms.      Patient Information: Pre-Operative Infection Prevention Measures     Why did I have my nose, under my arms, and groin swabbed?  The purpose of the swab is to identify Staphylococcus aureus inside your nose or on your skin.  The swab was sent to the laboratory for culture.  A positive swab/culture for Staphylococcus aureus is called colonization or carriage.      What is Staphylococcus aureus?  Staphylococcus aureus, also known as “staph”, is a germ found on the skin or in the nose of healthy people.  Sometimes Staphylococcus aureus can get into the body and cause an infection.  This can be minor (such as pimples, boils, or other skin problems).  It might also be serious (such as a blood infection, pneumonia, or a surgical site infection).    What is Staphylococcus aureus colonization or carriage?  Colonization or carriage means that a person has the germ but is not sick from it.  These bacteria can be spread on the hands or when breathing or sneezing.    How is Staphylococcus aureus spread?  It is most often spread by close contact with a person or item that carries it.    What happens if my culture is positive for Staphylococcus aureus?  Your doctor/medical team will use this information to guide any antibiotic treatment which may be necessary.  Regardless of the culture results, we will clean the inside of your nose with a betadine swab just before you have your surgery.      Will I get an infection if I have Staphylococcus aureus in my nose or on my  skin?  Anyone can get an infection with Staphylococcus aureus.  However, the best way to reduce your risk of infection is to follow the instructions provided to you for the use of your CHG soap and dental rinse.        Patient Information: Oral/Dental Rinse    What is oral/dental rinse?   It is a mouthwash. It is a way of cleaning the mouth with a germ-killing solution before your surgery.  The solution contains chlorhexidine, commonly known as CHG.   It is used inside the mouth to kill a bacteria known as Staphylococcus aureus.  Let your doctor know if you are allergic to Chlorhexidine.    Why do I need to use CHG oral/dental rinse?  The CHG oral/dental rinse helps to kill a bacteria in your mouth known as Staphylococcus aureus.     This reduces the risk of infection at the surgical site.      Using your CHG oral/dental rinse  STEPS:  Use your CHG oral/dental rinse after you brush your teeth the night before (at bedtime) and the morning of your surgery.  Follow all directions on your prescription label.    Use the cap on the container to measure 15ml   Swish (gargle if you can) the mouthwash in your mouth for at least 30 seconds, (do not swallow) and spit out  After you use your CHG rinse, do not rinse your mouth with water, drink or eat.  Please refer to the prescription label for the appropriate time to resume oral intake      What side effects might I have using the CHG oral/dental rinse?  CHG rinse will stick to plaque on the teeth.  Brush and floss just before use.  Teeth brushing will help avoid staining of plaque during use.      Patient Information: Home Preoperative Antibacterial Shower      What is a home preoperative antibacterial shower?  This shower is a way of cleaning the skin with a germ-killing solution before surgery.  The solution contains chlorhexidine, commonly known as CHG.  CHG is a skin cleanser with germ-killing ability.  Let your doctor know if you are allergic to chlorhexidine.    Why do  I need to take a preoperative antibacterial shower?  Skin is not sterile.  It is best to try to make your skin as free of germs as possible before surgery.  Proper cleansing with a germ-killing soap before surgery can lower the number of germs on your skin.  This helps to reduce the risk of infection at the surgical site.  Following the instructions listed below will help you prepare your skin for surgery.      How do I use the solution?  Steps:  Begin using your CHG soap 5 days before your scheduled surgery on ___START WASH 6/18/24______.    First, wash and rinse your hair using the CHG soap. Keep CHG soap away from ear canals and eyes.  Rinse completely, do not condition.  Hair extensions should be removed.  Wash your face with your normal soap and rinse.    Apply the CHG solution to a clean wet washcloth.  Turn the water off or move away from the water spray to avoid premature rinsing of the CHG soap as you are applying.   Firmly lather your entire body from the neck down.  Do not use on your face.  Pay special attention to the area(s) where your incision(s) will be located unless they are on your face.  Avoid scrubbing your skin too hard.  The important point is to have the CHG soap sit on your skin for 3 minutes.    When the 3 minutes are up, turn on the water and rinse the CHG solution off your body completely.   DO NOT wash with regular soap after you have used the CHG soap solution  Pat yourself dry with a clean, freshly-laundered towel.  DO NOT apply powders, deodorants, or lotions.  Dress in clean, freshly laundered nightclothes.    Be sure to sleep with clean, freshly laundered sheets.  Be aware that CHG will cause stains on fabrics; if you wash them with bleach after use.  Rinse your washcloth and other linens that have contact with CHG completely.  Use only non-chlorine detergents to launder the items used.   The morning of surgery is the fifth day.  Repeat the above steps and dress in clean comfortable  clothing     Whom should I contact if I have any questions regarding the use of CHG soap?  Call the [facility] at [phone] if you have any questions.

## 2024-06-20 ENCOUNTER — ANESTHESIA EVENT (OUTPATIENT)
Dept: OPERATING ROOM | Facility: HOSPITAL | Age: 79
End: 2024-06-20
Payer: MEDICARE

## 2024-06-20 LAB — STAPHYLOCOCCUS SPEC CULT: NORMAL

## 2024-06-21 ENCOUNTER — APPOINTMENT (OUTPATIENT)
Dept: RADIOLOGY | Facility: HOSPITAL | Age: 79
DRG: 038 | End: 2024-06-21
Payer: MEDICARE

## 2024-06-21 ENCOUNTER — ANESTHESIA (OUTPATIENT)
Dept: OPERATING ROOM | Facility: HOSPITAL | Age: 79
End: 2024-06-21
Payer: MEDICARE

## 2024-06-21 ENCOUNTER — HOSPITAL ENCOUNTER (INPATIENT)
Facility: HOSPITAL | Age: 79
LOS: 2 days | Discharge: HOME | DRG: 038 | End: 2024-06-23
Attending: SURGERY | Admitting: SURGERY
Payer: MEDICARE

## 2024-06-21 DIAGNOSIS — I65.23 CAROTID STENOSIS, BILATERAL: Primary | ICD-10-CM

## 2024-06-21 DIAGNOSIS — R00.1 BRADYCARDIA: ICD-10-CM

## 2024-06-21 DIAGNOSIS — I10 PRIMARY HYPERTENSION: ICD-10-CM

## 2024-06-21 LAB
ABO GROUP (TYPE) IN BLOOD: NORMAL
ANION GAP SERPL CALC-SCNC: 15 MMOL/L
BASOPHILS # BLD AUTO: 0.02 X10*3/UL (ref 0–0.1)
BASOPHILS NFR BLD AUTO: 0.2 %
BUN SERPL-MCNC: 36 MG/DL (ref 8–25)
CALCIUM SERPL-MCNC: 8.5 MG/DL (ref 8.5–10.4)
CHLORIDE SERPL-SCNC: 104 MMOL/L (ref 97–107)
CO2 SERPL-SCNC: 15 MMOL/L (ref 24–31)
CREAT SERPL-MCNC: 2.5 MG/DL (ref 0.4–1.6)
EGFRCR SERPLBLD CKD-EPI 2021: 25 ML/MIN/1.73M*2
EOSINOPHIL # BLD AUTO: 0 X10*3/UL (ref 0–0.4)
EOSINOPHIL NFR BLD AUTO: 0 %
ERYTHROCYTE [DISTWIDTH] IN BLOOD BY AUTOMATED COUNT: 12.1 % (ref 11.5–14.5)
GLUCOSE SERPL-MCNC: 238 MG/DL (ref 65–99)
HCT VFR BLD AUTO: 35.4 % (ref 41–52)
HGB BLD-MCNC: 11.8 G/DL (ref 13.5–17.5)
IMM GRANULOCYTES # BLD AUTO: 0.06 X10*3/UL (ref 0–0.5)
IMM GRANULOCYTES NFR BLD AUTO: 0.6 % (ref 0–0.9)
LYMPHOCYTES # BLD AUTO: 0.47 X10*3/UL (ref 0.8–3)
LYMPHOCYTES NFR BLD AUTO: 4.6 %
MAGNESIUM SERPL-MCNC: 2 MG/DL (ref 1.6–3.1)
MCH RBC QN AUTO: 28.9 PG (ref 26–34)
MCHC RBC AUTO-ENTMCNC: 33.3 G/DL (ref 32–36)
MCV RBC AUTO: 87 FL (ref 80–100)
MONOCYTES # BLD AUTO: 0.08 X10*3/UL (ref 0.05–0.8)
MONOCYTES NFR BLD AUTO: 0.8 %
NEUTROPHILS # BLD AUTO: 9.64 X10*3/UL (ref 1.6–5.5)
NEUTROPHILS NFR BLD AUTO: 93.8 %
NRBC BLD-RTO: 0 /100 WBCS (ref 0–0)
PHOSPHATE SERPL-MCNC: 2.8 MG/DL (ref 2.5–4.5)
PLATELET # BLD AUTO: 236 X10*3/UL (ref 150–450)
POTASSIUM SERPL-SCNC: 4.3 MMOL/L (ref 3.4–5.1)
RBC # BLD AUTO: 4.09 X10*6/UL (ref 4.5–5.9)
RH FACTOR (ANTIGEN D): NORMAL
SODIUM SERPL-SCNC: 134 MMOL/L (ref 133–145)
WBC # BLD AUTO: 10.3 X10*3/UL (ref 4.4–11.3)

## 2024-06-21 PROCEDURE — 2500000004 HC RX 250 GENERAL PHARMACY W/ HCPCS (ALT 636 FOR OP/ED): Performed by: NURSE PRACTITIONER

## 2024-06-21 PROCEDURE — 2500000005 HC RX 250 GENERAL PHARMACY W/O HCPCS

## 2024-06-21 PROCEDURE — 36415 COLL VENOUS BLD VENIPUNCTURE: CPT | Performed by: SURGERY

## 2024-06-21 PROCEDURE — 2500000001 HC RX 250 WO HCPCS SELF ADMINISTERED DRUGS (ALT 637 FOR MEDICARE OP): Performed by: NURSE PRACTITIONER

## 2024-06-21 PROCEDURE — 80048 BASIC METABOLIC PNL TOTAL CA: CPT

## 2024-06-21 PROCEDURE — 2720000007 HC OR 272 NO HCPCS: Performed by: SURGERY

## 2024-06-21 PROCEDURE — 83735 ASSAY OF MAGNESIUM: CPT

## 2024-06-21 PROCEDURE — 70450 CT HEAD/BRAIN W/O DYE: CPT

## 2024-06-21 PROCEDURE — 9420000001 HC RT PATIENT EDUCATION 5 MIN

## 2024-06-21 PROCEDURE — 88304 TISSUE EXAM BY PATHOLOGIST: CPT | Mod: TC | Performed by: SURGERY

## 2024-06-21 PROCEDURE — 84100 ASSAY OF PHOSPHORUS: CPT

## 2024-06-21 PROCEDURE — 03CM0ZZ EXTIRPATION OF MATTER FROM RIGHT EXTERNAL CAROTID ARTERY, OPEN APPROACH: ICD-10-PCS | Performed by: SURGERY

## 2024-06-21 PROCEDURE — 2500000004 HC RX 250 GENERAL PHARMACY W/ HCPCS (ALT 636 FOR OP/ED): Performed by: SURGERY

## 2024-06-21 PROCEDURE — 3600000009 HC OR TIME - EACH INCREMENTAL 1 MINUTE - PROCEDURE LEVEL FOUR: Performed by: SURGERY

## 2024-06-21 PROCEDURE — 35301 RECHANNELING OF ARTERY: CPT | Performed by: SURGERY

## 2024-06-21 PROCEDURE — 2780000003 HC OR 278 NO HCPCS: Performed by: SURGERY

## 2024-06-21 PROCEDURE — 3700000002 HC GENERAL ANESTHESIA TIME - EACH INCREMENTAL 1 MINUTE: Performed by: SURGERY

## 2024-06-21 PROCEDURE — 2500000004 HC RX 250 GENERAL PHARMACY W/ HCPCS (ALT 636 FOR OP/ED)

## 2024-06-21 PROCEDURE — 37799 UNLISTED PX VASCULAR SURGERY: CPT

## 2024-06-21 PROCEDURE — A4550 SURGICAL TRAYS: HCPCS | Performed by: SURGERY

## 2024-06-21 PROCEDURE — 99291 CRITICAL CARE FIRST HOUR: CPT | Performed by: NURSE PRACTITIONER

## 2024-06-21 PROCEDURE — 85025 COMPLETE CBC W/AUTO DIFF WBC: CPT

## 2024-06-21 PROCEDURE — 70450 CT HEAD/BRAIN W/O DYE: CPT | Performed by: RADIOLOGY

## 2024-06-21 PROCEDURE — 7100000002 HC RECOVERY ROOM TIME - EACH INCREMENTAL 1 MINUTE: Performed by: SURGERY

## 2024-06-21 PROCEDURE — A4217 STERILE WATER/SALINE, 500 ML: HCPCS | Performed by: SURGERY

## 2024-06-21 PROCEDURE — 3700000001 HC GENERAL ANESTHESIA TIME - INITIAL BASE CHARGE: Performed by: SURGERY

## 2024-06-21 PROCEDURE — 2500000005 HC RX 250 GENERAL PHARMACY W/O HCPCS: Performed by: NURSE PRACTITIONER

## 2024-06-21 PROCEDURE — 3600000004 HC OR TIME - INITIAL BASE CHARGE - PROCEDURE LEVEL FOUR: Performed by: SURGERY

## 2024-06-21 PROCEDURE — 03CH0ZZ EXTIRPATION OF MATTER FROM RIGHT COMMON CAROTID ARTERY, OPEN APPROACH: ICD-10-PCS | Performed by: SURGERY

## 2024-06-21 PROCEDURE — 7100000001 HC RECOVERY ROOM TIME - INITIAL BASE CHARGE: Performed by: SURGERY

## 2024-06-21 PROCEDURE — 2020000001 HC ICU ROOM DAILY

## 2024-06-21 PROCEDURE — 2500000005 HC RX 250 GENERAL PHARMACY W/O HCPCS: Performed by: SURGERY

## 2024-06-21 PROCEDURE — 03CK0ZZ EXTIRPATION OF MATTER FROM RIGHT INTERNAL CAROTID ARTERY, OPEN APPROACH: ICD-10-PCS | Performed by: SURGERY

## 2024-06-21 DEVICE — FLEXCEL CAROTID SHUNT (8F, 10F, 12F, 14F)
Type: IMPLANTABLE DEVICE | Site: CAROTID | Status: FUNCTIONAL
Brand: FLEXCEL CAROTID SHUNT

## 2024-06-21 RX ORDER — FINASTERIDE 5 MG/1
5 TABLET, FILM COATED ORAL DAILY
Status: DISCONTINUED | OUTPATIENT
Start: 2024-06-22 | End: 2024-06-22

## 2024-06-21 RX ORDER — ROSUVASTATIN CALCIUM 20 MG/1
40 TABLET, COATED ORAL NIGHTLY
Status: DISCONTINUED | OUTPATIENT
Start: 2024-06-22 | End: 2024-06-23 | Stop reason: HOSPADM

## 2024-06-21 RX ORDER — ONDANSETRON HYDROCHLORIDE 2 MG/ML
INJECTION, SOLUTION INTRAVENOUS AS NEEDED
Status: DISCONTINUED | OUTPATIENT
Start: 2024-06-21 | End: 2024-06-21

## 2024-06-21 RX ORDER — LIDOCAINE HYDROCHLORIDE AND EPINEPHRINE 20; 10 MG/ML; UG/ML
INJECTION, SOLUTION INFILTRATION; PERINEURAL AS NEEDED
Status: DISCONTINUED | OUTPATIENT
Start: 2024-06-21 | End: 2024-06-21 | Stop reason: HOSPADM

## 2024-06-21 RX ORDER — NALOXONE HYDROCHLORIDE 0.4 MG/ML
0.2 INJECTION, SOLUTION INTRAMUSCULAR; INTRAVENOUS; SUBCUTANEOUS EVERY 5 MIN PRN
Status: DISCONTINUED | OUTPATIENT
Start: 2024-06-21 | End: 2024-06-23 | Stop reason: HOSPADM

## 2024-06-21 RX ORDER — MIDAZOLAM HYDROCHLORIDE 1 MG/ML
INJECTION, SOLUTION INTRAMUSCULAR; INTRAVENOUS AS NEEDED
Status: DISCONTINUED | OUTPATIENT
Start: 2024-06-21 | End: 2024-06-21

## 2024-06-21 RX ORDER — METOPROLOL SUCCINATE 25 MG/1
25 TABLET, EXTENDED RELEASE ORAL DAILY
Status: DISCONTINUED | OUTPATIENT
Start: 2024-06-22 | End: 2024-06-23 | Stop reason: HOSPADM

## 2024-06-21 RX ORDER — SODIUM CHLORIDE, SODIUM LACTATE, POTASSIUM CHLORIDE, CALCIUM CHLORIDE 600; 310; 30; 20 MG/100ML; MG/100ML; MG/100ML; MG/100ML
100 INJECTION, SOLUTION INTRAVENOUS CONTINUOUS
Status: DISCONTINUED | OUTPATIENT
Start: 2024-06-21 | End: 2024-06-21 | Stop reason: HOSPADM

## 2024-06-21 RX ORDER — ALFUZOSIN HYDROCHLORIDE 10 MG/1
10 TABLET, EXTENDED RELEASE ORAL DAILY
Status: DISCONTINUED | OUTPATIENT
Start: 2024-06-21 | End: 2024-06-22

## 2024-06-21 RX ORDER — PANTOPRAZOLE SODIUM 40 MG/1
40 TABLET, DELAYED RELEASE ORAL DAILY
Status: DISCONTINUED | OUTPATIENT
Start: 2024-06-21 | End: 2024-06-23 | Stop reason: HOSPADM

## 2024-06-21 RX ORDER — LABETALOL HYDROCHLORIDE 5 MG/ML
5 INJECTION, SOLUTION INTRAVENOUS EVERY 10 MIN PRN
Status: DISCONTINUED | OUTPATIENT
Start: 2024-06-21 | End: 2024-06-21 | Stop reason: HOSPADM

## 2024-06-21 RX ORDER — HEPARIN SODIUM 1000 [USP'U]/ML
INJECTION, SOLUTION INTRAVENOUS; SUBCUTANEOUS AS NEEDED
Status: DISCONTINUED | OUTPATIENT
Start: 2024-06-21 | End: 2024-06-21 | Stop reason: HOSPADM

## 2024-06-21 RX ORDER — NICARDIPINE HYDROCHLORIDE 0.2 MG/ML
2.5-15 INJECTION INTRAVENOUS CONTINUOUS
Status: DISCONTINUED | OUTPATIENT
Start: 2024-06-21 | End: 2024-06-22

## 2024-06-21 RX ORDER — ROCURONIUM BROMIDE 10 MG/ML
INJECTION, SOLUTION INTRAVENOUS AS NEEDED
Status: DISCONTINUED | OUTPATIENT
Start: 2024-06-21 | End: 2024-06-21

## 2024-06-21 RX ORDER — CHOLECALCIFEROL (VITAMIN D3) 125 MCG
5000 CAPSULE ORAL DAILY
Status: DISCONTINUED | OUTPATIENT
Start: 2024-06-21 | End: 2024-06-23 | Stop reason: HOSPADM

## 2024-06-21 RX ORDER — DEXAMETHASONE SODIUM PHOSPHATE 100 MG/10ML
10 INJECTION INTRAMUSCULAR; INTRAVENOUS EVERY 6 HOURS
Status: COMPLETED | OUTPATIENT
Start: 2024-06-21 | End: 2024-06-22

## 2024-06-21 RX ORDER — LIDOCAINE HYDROCHLORIDE 10 MG/ML
0.1 INJECTION INFILTRATION; PERINEURAL ONCE
Status: DISCONTINUED | OUTPATIENT
Start: 2024-06-21 | End: 2024-06-21 | Stop reason: HOSPADM

## 2024-06-21 RX ORDER — FENTANYL CITRATE 50 UG/ML
50 INJECTION, SOLUTION INTRAMUSCULAR; INTRAVENOUS EVERY 5 MIN PRN
Status: DISCONTINUED | OUTPATIENT
Start: 2024-06-21 | End: 2024-06-21 | Stop reason: HOSPADM

## 2024-06-21 RX ORDER — PHENYLEPHRINE HCL IN 0.9% NACL 1 MG/10 ML
SYRINGE (ML) INTRAVENOUS AS NEEDED
Status: DISCONTINUED | OUTPATIENT
Start: 2024-06-21 | End: 2024-06-21

## 2024-06-21 RX ORDER — BUPIVACAINE HYDROCHLORIDE 5 MG/ML
INJECTION, SOLUTION PERINEURAL AS NEEDED
Status: DISCONTINUED | OUTPATIENT
Start: 2024-06-21 | End: 2024-06-21 | Stop reason: HOSPADM

## 2024-06-21 RX ORDER — LIDOCAINE HYDROCHLORIDE 10 MG/ML
INJECTION INFILTRATION; PERINEURAL AS NEEDED
Status: DISCONTINUED | OUTPATIENT
Start: 2024-06-21 | End: 2024-06-21 | Stop reason: HOSPADM

## 2024-06-21 RX ORDER — HYDRALAZINE HYDROCHLORIDE 20 MG/ML
5 INJECTION INTRAMUSCULAR; INTRAVENOUS
Status: DISCONTINUED | OUTPATIENT
Start: 2024-06-21 | End: 2024-06-21 | Stop reason: HOSPADM

## 2024-06-21 RX ORDER — OXYCODONE AND ACETAMINOPHEN 5; 325 MG/1; MG/1
1 TABLET ORAL EVERY 4 HOURS PRN
Status: DISCONTINUED | OUTPATIENT
Start: 2024-06-21 | End: 2024-06-23 | Stop reason: HOSPADM

## 2024-06-21 RX ORDER — ACETAMINOPHEN 325 MG/1
650 TABLET ORAL EVERY 4 HOURS PRN
Status: DISCONTINUED | OUTPATIENT
Start: 2024-06-21 | End: 2024-06-23 | Stop reason: HOSPADM

## 2024-06-21 RX ORDER — CEFAZOLIN SODIUM 2 G/100ML
2 INJECTION, SOLUTION INTRAVENOUS EVERY 8 HOURS
Status: COMPLETED | OUTPATIENT
Start: 2024-06-21 | End: 2024-06-22

## 2024-06-21 RX ORDER — CEFAZOLIN 1 G/1
INJECTION, POWDER, FOR SOLUTION INTRAVENOUS AS NEEDED
Status: DISCONTINUED | OUTPATIENT
Start: 2024-06-21 | End: 2024-06-21

## 2024-06-21 RX ORDER — CLOPIDOGREL BISULFATE 75 MG/1
75 TABLET ORAL DAILY
Status: DISCONTINUED | OUTPATIENT
Start: 2024-06-22 | End: 2024-06-23 | Stop reason: HOSPADM

## 2024-06-21 RX ORDER — HEPARIN SODIUM 1000 [USP'U]/ML
INJECTION, SOLUTION INTRAVENOUS; SUBCUTANEOUS AS NEEDED
Status: DISCONTINUED | OUTPATIENT
Start: 2024-06-21 | End: 2024-06-21

## 2024-06-21 RX ORDER — AMLODIPINE BESYLATE 10 MG/1
10 TABLET ORAL DAILY
Status: DISCONTINUED | OUTPATIENT
Start: 2024-06-21 | End: 2024-06-23 | Stop reason: HOSPADM

## 2024-06-21 RX ORDER — NAPROXEN SODIUM 220 MG/1
81 TABLET, FILM COATED ORAL DAILY
Status: DISCONTINUED | OUTPATIENT
Start: 2024-06-21 | End: 2024-06-23 | Stop reason: HOSPADM

## 2024-06-21 RX ORDER — AMOXICILLIN 250 MG
1 CAPSULE ORAL 2 TIMES DAILY
Status: DISCONTINUED | OUTPATIENT
Start: 2024-06-21 | End: 2024-06-23 | Stop reason: HOSPADM

## 2024-06-21 RX ORDER — FAMOTIDINE 20 MG/1
20 TABLET, FILM COATED ORAL 2 TIMES DAILY
Status: DISCONTINUED | OUTPATIENT
Start: 2024-06-21 | End: 2024-06-23 | Stop reason: HOSPADM

## 2024-06-21 RX ORDER — FENTANYL CITRATE 50 UG/ML
INJECTION, SOLUTION INTRAMUSCULAR; INTRAVENOUS AS NEEDED
Status: DISCONTINUED | OUTPATIENT
Start: 2024-06-21 | End: 2024-06-21

## 2024-06-21 RX ORDER — NORETHINDRONE AND ETHINYL ESTRADIOL 0.5-0.035
KIT ORAL AS NEEDED
Status: DISCONTINUED | OUTPATIENT
Start: 2024-06-21 | End: 2024-06-21

## 2024-06-21 RX ORDER — FENTANYL CITRATE 50 UG/ML
25 INJECTION, SOLUTION INTRAMUSCULAR; INTRAVENOUS EVERY 5 MIN PRN
Status: DISCONTINUED | OUTPATIENT
Start: 2024-06-21 | End: 2024-06-21 | Stop reason: HOSPADM

## 2024-06-21 RX ORDER — SODIUM CHLORIDE, SODIUM LACTATE, POTASSIUM CHLORIDE, CALCIUM CHLORIDE 600; 310; 30; 20 MG/100ML; MG/100ML; MG/100ML; MG/100ML
INJECTION, SOLUTION INTRAVENOUS CONTINUOUS PRN
Status: DISCONTINUED | OUTPATIENT
Start: 2024-06-21 | End: 2024-06-21

## 2024-06-21 RX ORDER — HYDRALAZINE HYDROCHLORIDE 20 MG/ML
5 INJECTION INTRAMUSCULAR; INTRAVENOUS EVERY 4 HOURS PRN
Status: DISCONTINUED | OUTPATIENT
Start: 2024-06-21 | End: 2024-06-22

## 2024-06-21 RX ORDER — PROPOFOL 10 MG/ML
INJECTION, EMULSION INTRAVENOUS AS NEEDED
Status: DISCONTINUED | OUTPATIENT
Start: 2024-06-21 | End: 2024-06-21

## 2024-06-21 RX ORDER — PROTAMINE SULFATE 10 MG/ML
INJECTION, SOLUTION INTRAVENOUS AS NEEDED
Status: DISCONTINUED | OUTPATIENT
Start: 2024-06-21 | End: 2024-06-21

## 2024-06-21 SDOH — SOCIAL STABILITY: SOCIAL INSECURITY: HAVE YOU HAD THOUGHTS OF HARMING ANYONE ELSE?: NO

## 2024-06-21 SDOH — SOCIAL STABILITY: SOCIAL INSECURITY: WERE YOU ABLE TO COMPLETE ALL THE BEHAVIORAL HEALTH SCREENINGS?: YES

## 2024-06-21 SDOH — SOCIAL STABILITY: SOCIAL INSECURITY: HAS ANYONE EVER THREATENED TO HURT YOUR FAMILY OR YOUR PETS?: NO

## 2024-06-21 SDOH — SOCIAL STABILITY: SOCIAL INSECURITY: DOES ANYONE TRY TO KEEP YOU FROM HAVING/CONTACTING OTHER FRIENDS OR DOING THINGS OUTSIDE YOUR HOME?: NO

## 2024-06-21 SDOH — SOCIAL STABILITY: SOCIAL INSECURITY: DO YOU FEEL UNSAFE GOING BACK TO THE PLACE WHERE YOU ARE LIVING?: NO

## 2024-06-21 SDOH — SOCIAL STABILITY: SOCIAL INSECURITY: ARE THERE ANY APPARENT SIGNS OF INJURIES/BEHAVIORS THAT COULD BE RELATED TO ABUSE/NEGLECT?: NO

## 2024-06-21 SDOH — SOCIAL STABILITY: SOCIAL INSECURITY: HAVE YOU HAD ANY THOUGHTS OF HARMING ANYONE ELSE?: NO

## 2024-06-21 SDOH — HEALTH STABILITY: MENTAL HEALTH: CURRENT SMOKER: 0

## 2024-06-21 SDOH — SOCIAL STABILITY: SOCIAL INSECURITY: DO YOU FEEL ANYONE HAS EXPLOITED OR TAKEN ADVANTAGE OF YOU FINANCIALLY OR OF YOUR PERSONAL PROPERTY?: NO

## 2024-06-21 SDOH — SOCIAL STABILITY: SOCIAL INSECURITY: ABUSE: ADULT

## 2024-06-21 SDOH — SOCIAL STABILITY: SOCIAL INSECURITY: ARE YOU OR HAVE YOU BEEN THREATENED OR ABUSED PHYSICALLY, EMOTIONALLY, OR SEXUALLY BY ANYONE?: NO

## 2024-06-21 SDOH — HEALTH STABILITY: MENTAL HEALTH: EXPERIENCED ANY OF THE FOLLOWING LIFE EVENTS: DEATH OF FAMILY/FRIEND

## 2024-06-21 ASSESSMENT — ACTIVITIES OF DAILY LIVING (ADL)
FEEDING YOURSELF: INDEPENDENT
TOILETING: INDEPENDENT
LACK_OF_TRANSPORTATION: NO
ADEQUATE_TO_COMPLETE_ADL: YES
DRESSING YOURSELF: INDEPENDENT
GROOMING: INDEPENDENT
BATHING: INDEPENDENT
HEARING - LEFT EAR: DIFFICULTY WITH NOISE
JUDGMENT_ADEQUATE_SAFELY_COMPLETE_DAILY_ACTIVITIES: YES
WALKS IN HOME: INDEPENDENT
HEARING - RIGHT EAR: DIFFICULTY WITH NOISE
PATIENT'S MEMORY ADEQUATE TO SAFELY COMPLETE DAILY ACTIVITIES?: YES

## 2024-06-21 ASSESSMENT — PATIENT HEALTH QUESTIONNAIRE - PHQ9
SUM OF ALL RESPONSES TO PHQ9 QUESTIONS 1 & 2: 0
1. LITTLE INTEREST OR PLEASURE IN DOING THINGS: NOT AT ALL
SUM OF ALL RESPONSES TO PHQ9 QUESTIONS 1 & 2: 0
2. FEELING DOWN, DEPRESSED OR HOPELESS: NOT AT ALL
1. LITTLE INTEREST OR PLEASURE IN DOING THINGS: NOT AT ALL
2. FEELING DOWN, DEPRESSED OR HOPELESS: NOT AT ALL

## 2024-06-21 ASSESSMENT — PAIN SCALES - GENERAL
PAINLEVEL_OUTOF10: 0 - NO PAIN
PAINLEVEL_OUTOF10: 0 - NO PAIN
PAINLEVEL_OUTOF10: 4
PAINLEVEL_OUTOF10: 0 - NO PAIN
PAINLEVEL_OUTOF10: 1
PAINLEVEL_OUTOF10: 0 - NO PAIN
PAIN_LEVEL: 3

## 2024-06-21 ASSESSMENT — COGNITIVE AND FUNCTIONAL STATUS - GENERAL
PATIENT BASELINE BEDBOUND: NO
DAILY ACTIVITIY SCORE: 24
MOBILITY SCORE: 24

## 2024-06-21 ASSESSMENT — LIFESTYLE VARIABLES
HOW OFTEN DO YOU HAVE A DRINK CONTAINING ALCOHOL: NEVER
HOW OFTEN DO YOU HAVE A DRINK CONTAINING ALCOHOL: NEVER
HOW OFTEN DO YOU HAVE 6 OR MORE DRINKS ON ONE OCCASION: NEVER
AUDIT-C TOTAL SCORE: 0
HOW MANY STANDARD DRINKS CONTAINING ALCOHOL DO YOU HAVE ON A TYPICAL DAY: PATIENT DOES NOT DRINK
PRESCIPTION_ABUSE_PAST_12_MONTHS: NO
SKIP TO QUESTIONS 9-10: 1
AUDIT-C TOTAL SCORE: 0
HOW MANY STANDARD DRINKS CONTAINING ALCOHOL DO YOU HAVE ON A TYPICAL DAY: PATIENT DOES NOT DRINK
AUDIT-C TOTAL SCORE: 0
SUBSTANCE_ABUSE_PAST_12_MONTHS: NO
PRESCIPTION_ABUSE_PAST_12_MONTHS: NO
HOW OFTEN DO YOU HAVE 6 OR MORE DRINKS ON ONE OCCASION: NEVER
SKIP TO QUESTIONS 9-10: 1
AUDIT-C TOTAL SCORE: 0
SUBSTANCE_ABUSE_PAST_12_MONTHS: NO

## 2024-06-21 ASSESSMENT — PAIN - FUNCTIONAL ASSESSMENT
PAIN_FUNCTIONAL_ASSESSMENT: 0-10

## 2024-06-21 ASSESSMENT — PAIN DESCRIPTION - ORIENTATION: ORIENTATION: RIGHT

## 2024-06-21 ASSESSMENT — COLUMBIA-SUICIDE SEVERITY RATING SCALE - C-SSRS
6. HAVE YOU EVER DONE ANYTHING, STARTED TO DO ANYTHING, OR PREPARED TO DO ANYTHING TO END YOUR LIFE?: NO
2. HAVE YOU ACTUALLY HAD ANY THOUGHTS OF KILLING YOURSELF?: NO
1. IN THE PAST MONTH, HAVE YOU WISHED YOU WERE DEAD OR WISHED YOU COULD GO TO SLEEP AND NOT WAKE UP?: NO

## 2024-06-21 ASSESSMENT — PAIN DESCRIPTION - LOCATION: LOCATION: NECK

## 2024-06-21 NOTE — ANESTHESIA POSTPROCEDURE EVALUATION
Patient: Charles Chan    Procedure Summary       Date: 06/21/24 Room / Location: [facility] OR 08 / [facility]    Anesthesia Start: 0757 Anesthesia Stop: 1021    Procedure: Endarterectomy Carotid Artery (Right) Diagnosis:       Carotid stenosis, bilateral      (Carotid stenosis, bilateral [I65.23])    Surgeons: Aung Botello MD Responsible Provider: Charles Walker MD    Anesthesia Type: general ASA Status: 3            Anesthesia Type: general    Vitals Value Taken Time   /64 06/21/24 1132   Temp 36.1 °C (97 °F) 06/21/24 1100   Pulse 74 06/21/24 1104   Resp 12 06/21/24 1104   SpO2 93 % 06/21/24 1104   Vitals shown include unfiled device data.    Anesthesia Post Evaluation    Patient location during evaluation: PACU  Patient participation: complete - patient participated  Level of consciousness: awake and awake and alert  Pain score: 3  Pain management: adequate  Multimodal analgesia pain management approach  Airway patency: patent  Two or more strategies used to mitigate risk of obstructive sleep apnea  Cardiovascular status: acceptable and hemodynamically stable  Respiratory status: acceptable  Hydration status: acceptable  Postoperative Nausea and Vomiting: none        No notable events documented.

## 2024-06-21 NOTE — ANESTHESIA PREPROCEDURE EVALUATION
Patient: Charles Chan    Procedure Information       Date/Time: 06/21/24 0800    Procedure: Endarterectomy Carotid Artery (Right)    Location: [facility] / [facility]    Surgeons: Aung Botello MD            Visit Vitals  /65   Pulse 52   Temp 36.5 °C (97.7 °F) (Temporal)   Resp 17   SpO2 95%   Smoking Status Former        Current Outpatient Medications   Medication Instructions    alfuzosin (Uroxatral) 10 mg 24 hr tablet TAKE 1 TABLET BY MOUTH EVERY DAY    amLODIPine (Norvasc) 10 mg tablet TAKE 1 TABLET BY MOUTH EVERY DAY    ammonium lactate (Lac-Hydrin) 12 % lotion Apply and rub in a thin film to affected areas twice daily (am and pm)    aspirin 325 mg, oral, Daily    bacitracin 500 unit/gram ointment Topical, 2 times daily    cephalexin (KEFLEX) 500 mg, oral, 3 times daily    chlorhexidine (Peridex) 0.12 % solution Use as directed.    cholecalciferol (VITAMIN D-3) 5,000 Units, oral, Daily    clopidogrel (Plavix) 75 mg tablet TAKE 1 TABLET BY MOUTH EVERY DAY    cyanocobalamin (vitamin B-12) (VITAMIN B-12) 1,000 mcg, oral, Daily, As directed    econazole nitrate 1 % cream APPLY TO AFFECTED AREA EVERY DAY AS DIRECTED *30 DAY SUPPLY*    famotidine (Pepcid) 20 mg tablet TAKE 1 TABLET BY MOUTH TWICE A DAY    finasteride (Proscar) 5 mg tablet TAKE 1 TABLET BY MOUTH EVERY DAY    fluticasone (Flonase) 50 mcg/actuation nasal spray USE 1 SPRAY EVERY DAY IN EACH NOSTRIL *LASTS 60 DAYS*    metoprolol succinate XL (TOPROL-XL) 25 mg, oral, Daily, DO NOT CRUSH OR CHEW    pantoprazole (ProtoNix) 40 mg EC tablet TAKE 1 TABLET BY MOUTH ONCE DAILY IN THE MORNING BEFORE A MEAL. DO NOT CRUSH, CHEW OR SPLIT    rosuvastatin (CRESTOR) 40 mg, oral, Daily    salmeterol (Serevent Diskus) 50 mcg/dose diskus inhaler 1 puff, inhalation, 2 times daily RT    triamcinolone (Kenalog) 0.1 % cream Topical, 2 times daily, to affected area    Took plavix this am per Dr. Botello's instructions; took metoprolol    No Known Allergies     Past  Surgical History:   Procedure Laterality Date    APPENDECTOMY  06/13/2013    Appendectomy    CARDIAC CATHETERIZATION  2008    2 stents placed    COLON SURGERY  06/13/2013    partial colectomy for diverticular disease    CORONARY ANGIOPLASTY      GALLBLADDER SURGERY  06/13/2013    Gallbladder Surgery        Relevant Problems   Cardiac   (+) AAA (abdominal aortic aneurysm) (CMS-HCC)   (+) Chest pain   (+) Hypercholesterolemia   (+) Hypertension   (+) Pure hypercholesterolemia      Neuro   (+) Carotid stenosis   (+) Carotid stenosis, bilateral      GI   (+) Esophageal reflux      /Renal   (+) UTI (urinary tract infection)      Endocrine   (+) Class 1 obesity with body mass index (BMI) of 31.0 to 31.9 in adult      Hematology   (+) Anemia      HEENT   (+) Sinusitis      ID   (+) Acute infective gastroenteritis   (+) Ear infection   (+) UTI (urinary tract infection)   High suspicion for sleep apnea, resolving cellulitis of LE's    Active Ambulatory Problems     Diagnosis Date Noted    AAA (abdominal aortic aneurysm) (CMS-HCC) 01/16/2023    Abdominal pain 01/16/2023    Abnormality of glucagon secretion ([facility]) 01/16/2023    Acute infective gastroenteritis 01/16/2023    Anemia 01/16/2023    Aneurysm (CMS-HCC) 01/16/2023    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/16/2023    Bladder pain 01/16/2023    Bursitis of right shoulder 01/16/2023    Carotid stenosis 01/16/2023    Cataract 01/16/2023    Cellulitis 01/16/2023    Chronic reflux esophagitis 01/16/2023    Class 1 obesity with body mass index (BMI) of 31.0 to 31.9 in adult 01/16/2023    Claudication (CMS-HCC) 01/16/2023    Colon polyps 01/16/2023    Dehydration 01/16/2023    Dizziness 01/16/2023    Ear infection 01/16/2023    Elevated blood sugar 01/16/2023    Environmental allergies 01/16/2023    Esophageal reflux 01/16/2023    Eye redness 01/16/2023    Foot pain 01/16/2023    Feeling weak 01/16/2023    Hypertension 01/16/2023    Hypercholesterolemia 01/16/2023     Kidney pain 01/16/2023    Impingement syndrome of shoulder 01/16/2023    Leg pain 01/16/2023    Renal failure 01/16/2023    Pure hypercholesterolemia 01/16/2023    Right shoulder pain 01/16/2023    Sinusitis 01/16/2023    Shortness of breath 01/16/2023    S/P TURP 01/16/2023    Urinary frequency 01/16/2023    UTI (urinary tract infection) 01/16/2023    Vitamin B deficiency 01/16/2023    Vitamin D deficiency 01/16/2023    Chest pain 09/18/2023    Chronic renal failure, stage 2 (mild) 09/18/2023    Prediabetes 09/18/2023    Cellulitis of left leg 04/17/2024    Venous stasis dermatitis of both lower extremities 04/17/2024    Venous insufficiency of both lower extremities 04/17/2024    Stage 3a chronic kidney disease (Multi) 04/17/2024    Carotid stenosis, bilateral 06/04/2024     Resolved Ambulatory Problems     Diagnosis Date Noted    No Resolved Ambulatory Problems     Past Medical History:   Diagnosis Date    BPH (benign prostatic hyperplasia)     Chronic renal disease, stage 2, mildly decreased glomerular filtration rate (GFR) between 60-89 mL/min/1.73 square meter     CKD (chronic kidney disease), stage III (Multi)     Coronary artery disease     GERD (gastroesophageal reflux disease)     High cholesterol     HTN (hypertension)     Hyperlipemia     Hypothyroidism     Overweight     Personal history of other diseases of the digestive system 10/27/2021       Clinical information reviewed:    Allergies  Meds   Med Hx  Surg Hx   Fam Hx          NPO Detail:    NPO/Void Status  Carbohydrate Drink Given Prior to Surgery? : N  Date of Last Liquid: 06/20/24  Time of Last Liquid: 1900  Date of Last Solid: 06/20/24  Time of Last Solid: 1900  Last Intake Type: Light meal  Time of Last Void: 0500         Physical Exam    Airway  Mallampati: II  TM distance: >3 FB  Neck ROM: limited     Cardiovascular - normal exam     Dental   (+) lower dentures     Pulmonary - normal exam     Abdominal        Cardiologist clearance note  reviewed. AAA stable at 4.2 cm with yearly imaging    Anesthesia Plan    History of general anesthesia?: yes  History of complications of general anesthesia?: no    ASA 3     general   (With ETT, pre-induction A-line)  The patient is not a current smoker.    intravenous induction   Anesthetic plan and risks discussed with patient and spouse.

## 2024-06-21 NOTE — ANESTHESIA PROCEDURE NOTES
Airway  Date/Time: 6/21/2024 8:14 AM  Urgency: elective    Airway not difficult    Staffing  Performed: SRNA   Authorized by: Charles Walker MD    Performed by: Hilda Hoffman  Patient location during procedure: OR    Indications and Patient Condition  Indications for airway management: anesthesia  Spontaneous Ventilation: absent  Sedation level: deep  Preoxygenated: yes  Patient position: sniffing  Mask difficulty assessment: 2 - vent by mask + OA or adjuvant +/- NMBA    Final Airway Details  Final airway type: endotracheal airway      Successful airway: ETT  Cuffed: yes   Successful intubation technique: direct laryngoscopy  Facilitating devices/methods: intubating stylet and anterior pressure/BURP  Endotracheal tube insertion site: oral  Blade: Maikel  Blade size: #4  ETT size (mm): 7.5  Cormack-Lehane Classification: grade I - full view of glottis  Placement verified by: capnometry   Cuff volume (mL): 7  Measured from: lips  ETT to lips (cm): 23  Number of attempts at approach: 1  Number of other approaches attempted: 0    Additional Comments  Lips and teeth remain in preanesthetic condition. Head and neck positioned neutral following intubation.

## 2024-06-21 NOTE — OP NOTE
Endarterectomy Carotid Artery (R) Operative Note     Date: 2024  OR Location: [facility]    Name: Charles Chan, : 1945, Age: 79 y.o., MRN: [id number], Sex: male    Diagnosis  Pre-op Diagnosis     * Carotid stenosis, bilateral [I65.23] Post-op Diagnosis     * Carotid stenosis, bilateral [I65.23]     Procedures  Endarterectomy Carotid Artery  12510 - AK TEAEC W/PATCH GRF CAROTID VERTB SUBCLAV NECK INC      Surgeons      * Aung Botello - Primary    Resident/Fellow/Other Assistant:  Surgeons and Role:  * No surgeons found with a matching role *    Procedure Summary  Anesthesia: General  ASA: ASA status not filed in the log.  Anesthesia Staff: Anesthesiologist: Charles Walker MD  SRNA: Hilda Hoffman  Estimated Blood Loss: 25mL  Intra-op Medications:   Administrations occurring from 0800 to 1030 on 24:   Medication Name Total Dose   lidocaine-epinephrine (Xylocaine W/EPI) 2 %-1:100,000 injection 12 mL   BUPivacaine HCl (Marcaine) 0.5 % (5 mg/mL) injection 12 mL   thrombin solution 10,000 Units   lidocaine (Xylocaine) 10 mg/mL (1 %) injection 1 mL   heparin (porcine) 10,000 Units in sodium chloride 0.9 % 1,000 mL irrigation 10,000 Units              Anesthesia Record               Intraprocedure I/O Totals       None           Specimen:   ID Type Source Tests Collected by Time   1 : Plaque from right artery. Tissue [facility] EXAM Aung Botello MD 2024 0954        Staff:   Scrub Person: Joanne  Scrub Person: Cecilia  Circulator: Ciarra  Circulator: Zoë         Drains and/or Catheters: * None in log *    Tourniquet Times:         Implants:  Implants       Type Name Action Serial No.       FLEXCEL STRAIGHT CAROTID SHUNTS, INCLUDES SIZES 8FR, 10FR, 12FR, 14FR Implanted               Findings: Severe necrotic plaque of proximal ICA    Indications: Charles Chan is an 79 y.o. male who is having surgery for Carotid stenosis, bilateral [I65.23].     The patient was seen in the  preoperative area. The risks, benefits, complications, treatment options, non-operative alternatives, expected recovery and outcomes were discussed with the patient. The possibilities of reaction to medication, pulmonary aspiration, injury to surrounding structures, bleeding, recurrent infection, the need for additional procedures, failure to diagnose a condition, and creating a complication requiring transfusion or operation were discussed with the patient. The patient concurred with the proposed plan, giving informed consent.  The site of surgery was properly noted/marked if necessary per policy. The patient has been actively warmed in preoperative area. Preoperative antibiotics have been ordered and given within 1 hours of incision. Venous thrombosis prophylaxis are not indicated.    Procedure Details:   Patient was placed in a comfortable supine position and extended.  Left rotation of the neck was limited due to patient stiffness.  Curvilinear incision made along skin lines and platysmal flaps were raised.  Superficial veins divided with LigaSure.  Subcutaneous, subplatysmal flaps raised cephalad and caudad.  The sternocleidomastoid muscle was mobilized and retracted laterally.  The facial vein was ligated and divided.  The internal jugular vein was then mobilized and retracted laterally.  The 10th nerve was visualized and avoided.  The 12th nerve was visualized at the top of the incision and was avoided.  The common internal and external carotid arteries were controlled and the patient was given 10,000 units of heparin.  The common carotid internal and external carotid arteries were clamped and the internal carotid artery was further dissected and arteriotomy created at the origin of the internal carotid artery to detach it from the bifurcation.  Eversion endarterectomy was then performed of the plaque.  The plaque was noted to have a murky greenish-gray core.  Satisfactory endpoints were established at the  end of the endarterectomy.  The common plaque was not as severe.  A 12 [place] shunt was then placed and secured.  Flow was confirmed visually.  The common through external plaque was then endarterectomized up through a satisfactory endpoint on the external carotid artery.  Remaining adventitial was thoroughly inspected and then irrigated with heparinized saline.  And side anastomosis was performed after specializing the internal carotid artery to take up the extra slack created by the dissection of it.  The arteriotomy on the common carotid was also extended a similar distance.  6-0 Prolene was used for the anastomosis.  Prior to completion of the anastomosis the shunt was clamped and removed.  The arteriotomy was then closed.  Flow was restored first to the external into the internal carotid artery.  Flow was confirmed by Doppler were excellent.  Hemostasis sutures were placed in 2 areas that were bleeding.  Once hemostasis was assured and the wound the patient's heparin was reversed with protamine 40 mg.  The platysma was closed with Vicryl and the skin was closed with Monocryl.  A dressing was applied.      Complications: none  Disposition: ICU - extubated and stable.  Condition: stable         Additional Details:     Attending Attestation: I was present for the entire procedure.    Aung Botello  Phone Number: [phone]

## 2024-06-21 NOTE — CARE PLAN
The patient's goals for the shift include      The clinical goals for the shift include patient will remain hemodynamically stable this shift    Over the shift, the patient did not make progress toward the following goals. Barriers to progression inclu  Problem: Skin  Goal: Decreased wound size/increased tissue granulation at next dressing change  Outcome: Progressing  Flowsheets (Taken 6/21/2024 1341)  Decreased wound size/increased tissue granulation at next dressing change:   Protective dressings over bony prominences   Utilize specialty bed per algorithm   Promote sleep for wound healing  Goal: Participates in plan/prevention/treatment measures  Outcome: Progressing  Flowsheets (Taken 6/21/2024 1341)  Participates in plan/prevention/treatment measures:   Increase activity/out of bed for meals   Elevate heels   Discuss with provider PT/OT consult  Goal: Prevent/manage excess moisture  Outcome: Progressing  Flowsheets (Taken 6/21/2024 1341)  Prevent/manage excess moisture:   Cleanse incontinence/protect with barrier cream   Follow provider orders for dressing changes   Moisturize dry skin   Monitor for/manage infection if present  Goal: Prevent/minimize sheer/friction injuries  Outcome: Progressing  Flowsheets (Taken 6/21/2024 1341)  Prevent/minimize sheer/friction injuries:   Complete micro-shifts as needed if patient unable. Adjust patient position to relieve pressure points, not a full turn   Turn/reposition every 2 hours/use positioning/transfer devices   HOB 30 degrees or less   Use pull sheet   Increase activity/out of bed for meals   Utilize specialty bed per algorithm  Goal: Promote/optimize nutrition  Outcome: Progressing  Flowsheets (Taken 6/21/2024 1341)  Promote/optimize nutrition:   Consume > 50% meals/supplements   Offer water/supplements/favorite foods   Monitor/record intake including meals  Goal: Promote skin healing  Outcome: Progressing  Flowsheets (Taken 6/21/2024 1341)  Promote skin healing:    Ensure correct size (line/device) and apply per  instructions   Turn/reposition every 2 hours/use positioning/transfer devices   Assess skin/pad under line(s)/device(s)   Rotate device position/do not position patient on device   Protective dressings over bony prominences     Problem: Pain  Goal: Takes deep breaths with improved pain control throughout the shift  Outcome: Progressing  Goal: Turns in bed with improved pain control throughout the shift  Outcome: Progressing  Goal: Walks with improved pain control throughout the shift  Outcome: Progressing  Goal: Performs ADL's with improved pain control throughout shift  Outcome: Progressing  Goal: Participates in PT with improved pain control throughout the shift  Outcome: Progressing  Goal: Free from opioid side effects throughout the shift  Outcome: Progressing  Goal: Free from acute confusion related to pain meds throughout the shift  Outcome: Progressing     Problem: Pain - Adult  Goal: Verbalizes/displays adequate comfort level or baseline comfort level  Outcome: Progressing     Problem: Safety - Adult  Goal: Free from fall injury  Outcome: Progressing     Problem: Discharge Planning  Goal: Discharge to home or other facility with appropriate resources  Outcome: Progressing     Problem: Chronic Conditions and Co-morbidities  Goal: Patient's chronic conditions and co-morbidity symptoms are monitored and maintained or improved  Outcome: Progressing

## 2024-06-21 NOTE — SIGNIFICANT EVENT
"Was contacted by the bedside RN.  Patient was having pressure on the right side of his head.  RN states that patient \"feels like his eye\" is going to be sucked in.  No neurologic deficits.  Systolic blood pressure in normal range with Cardene drip.  Was initially hypertensive postoperatively but has decreased gradually with the initiation of antihypertensive drip.  Discussed with Dr. Botello.  Obtain stat head CT as well as begin course of IV Decadron, 10 mg every 6 hours x 4 doses  "

## 2024-06-21 NOTE — H&P
History Of Present Illness  Charles Chan is a 79 y.o. male presenting with bilateral carotid artery stenosis now s/p right carotid enterectomy 6/21/24 with Dr. Botello.  PMHx notable for HTN, stable AAA 4.8cm, CAD, PAD, PVD, LE venous statis and cellulitis, CKD stage 2 with baseline Scr 2.0, and GERD.  Pt presented from home for his procedure, was extubated in the OR and is now admitted to the ICU for close neurovascular and hemodynamic monitoring.      BP goals -140mmHg per Dr. Botello.        Past Medical History  Past Medical History:   Diagnosis Date    AAA (abdominal aortic aneurysm) (CMS-[facility])     Anemia     Arthritis     BPH (benign prostatic hyperplasia)     Carotid stenosis     Cellulitis     left leg    Chronic renal disease, stage 2, mildly decreased glomerular filtration rate (GFR) between 60-89 mL/min/1.73 square meter     CKD (chronic kidney disease), stage III (Multi)     Coronary artery disease     Environmental allergies     GERD (gastroesophageal reflux disease)     High cholesterol     HTN (hypertension)     Hyperlipemia     Hypertension     Hypothyroidism     Overweight     Personal history of other diseases of the digestive system 10/27/2021    History of gastroesophageal reflux (GERD)    Prediabetes     Venous insufficiency of both lower extremities     Venous stasis dermatitis of both lower extremities        Surgical History  Past Surgical History:   Procedure Laterality Date    APPENDECTOMY  06/13/2013    Appendectomy    CARDIAC CATHETERIZATION  2008    2 stents placed    COLON SURGERY  06/13/2013    partial colectomy for diverticular disease    CORONARY ANGIOPLASTY      GALLBLADDER SURGERY  06/13/2013    Gallbladder Surgery    VASCULAR SURGERY Right 06/21/2024    carotid endardectomy        Social History  He reports that he quit smoking about 31 years ago. His smoking use included cigarettes. He started smoking about 61 years ago. He has a 30 pack-year smoking history. He has never used  smokeless tobacco. He reports that he does not currently use alcohol. He reports that he does not use drugs.    Family History  Family History   Problem Relation Name Age of Onset    Hypertension Mother      Heart disease Mother      Stroke Father      Diabetes Other      Hypertension Other          Allergies  Patient has no known allergies.    Review of Systems  C/O moderate incisional pain.  Reports LLE cellulitis much improved       Physical Exam  Constitutional:       Appearance: He is obese.   HENT:      Head: Normocephalic.   Eyes:      Pupils: Pupils are equal, round, and reactive to light.   Neck:      Comments: surgical dressing with blood spotting  Cardiovascular:      Rate and Rhythm: Normal rate and regular rhythm.   Pulmonary:      Effort: Pulmonary effort is normal. No respiratory distress.      Breath sounds: No wheezing or rales.      Comments: IS > 2500cc  Abdominal:      General: Bowel sounds are normal.      Tenderness: There is no abdominal tenderness. There is no guarding.   Musculoskeletal:         General: Normal range of motion.      Cervical back: Neck supple. Tenderness present.   Skin:     General: Skin is warm and dry.      Capillary Refill: Capillary refill takes less than 2 seconds.      Comments: Modest erythema to LLE   Neurological:      General: No focal deficit present.      Mental Status: He is alert and oriented to person, place, and time.   Psychiatric:         Mood and Affect: Mood normal.          Last Recorded Vitals  Blood pressure 173/78, pulse 90, temperature 36.6 °C (97.9 °F), temperature source Oral, resp. rate 17, height 1.829 m (6'), weight 122 kg (269 lb 13.5 oz), SpO2 90%.    Relevant Results         Assessment/Plan   Principal Problem:    Carotid stenosis, bilateral  POD #0 s/p R cardotid enterectomy   HTN  CAD, PVD, PAD,   AAA 4.8 cm  CKD stage 2  GERD  - Neruovascular monitoring per protocol.  Head CT stat if he develops a headache with c/f hyperperfusion s/p  enterectomy   - Tele and arterial line for close hemodynamic monitoring  - Resume home cardiac meds tomorrow: ASA, plavix, alfuzosin, amlodipine, metoprolol, rosuvastatin  Start nicardipine as needed for goal  - 140mmHg per Dr. Botello   NC as needed for SpO2 goal > 92%. IS and OOB for pulm hygiene   Resume diet.  Resume PPI.  BR while taking opiates    Daily labs     Family: updated bedside      Dispo:  ICU for close neurovasc and hemodynamic monitoring today, summer transfer out tomrrow    Critical care time: 40 minutes including data gathering, med reconciliation and management, physical exam, notation and coordination of care.      Ricardo Hines, APRN-CNP

## 2024-06-21 NOTE — CARE PLAN
The patient's goals for the shift include  rest    The clinical goals for the shift include patient will remain hemodynamically stable this shift    Problem: Skin  Goal: Decreased wound size/increased tissue granulation at next dressing change  Flowsheets (Taken 6/21/2024 1954)  Decreased wound size/increased tissue granulation at next dressing change:   Promote sleep for wound healing   Utilize specialty bed per algorithm   Protective dressings over bony prominences  Goal: Participates in plan/prevention/treatment measures  Flowsheets (Taken 6/21/2024 1954)  Participates in plan/prevention/treatment measures:   Discuss with provider PT/OT consult   Elevate heels   Increase activity/out of bed for meals  Goal: Prevent/manage excess moisture  Flowsheets (Taken 6/21/2024 1954)  Prevent/manage excess moisture:   Cleanse incontinence/protect with barrier cream   Moisturize dry skin   Use wicking fabric (obtain order)   Follow provider orders for dressing changes   Monitor for/manage infection if present  Goal: Prevent/minimize sheer/friction injuries  Flowsheets (Taken 6/21/2024 1954)  Prevent/minimize sheer/friction injuries:   Complete micro-shifts as needed if patient unable. Adjust patient position to relieve pressure points, not a full turn   Increase activity/out of bed for meals   Use pull sheet   Utilize specialty bed per algorithm   Turn/reposition every 2 hours/use positioning/transfer devices   HOB 30 degrees or less  Goal: Promote/optimize nutrition  Flowsheets (Taken 6/21/2024 1954)  Promote/optimize nutrition:   Assist with feeding   Monitor/record intake including meals   Offer water/supplements/favorite foods   Consume > 50% meals/supplements   Reassess MST if dietician not consulted  Goal: Promote skin healing  Flowsheets (Taken 6/21/2024 1954)  Promote skin healing:   Assess skin/pad under line(s)/device(s)   Protective dressings over bony prominences   Turn/reposition every 2 hours/use  positioning/transfer devices   Rotate device position/do not position patient on device   Ensure correct size (line/device) and apply per  instructions     Problem: Pain - Adult  Goal: Verbalizes/displays adequate comfort level or baseline comfort level  Outcome: Progressing     Problem: Safety - Adult  Goal: Free from fall injury  Outcome: Progressing     Problem: Discharge Planning  Goal: Discharge to home or other facility with appropriate resources  Outcome: Progressing     Problem: Chronic Conditions and Co-morbidities  Goal: Patient's chronic conditions and co-morbidity symptoms are monitored and maintained or improved  Outcome: Progressing

## 2024-06-22 ENCOUNTER — APPOINTMENT (OUTPATIENT)
Dept: PRIMARY CARE | Facility: CLINIC | Age: 79
End: 2024-06-22
Payer: MEDICARE

## 2024-06-22 LAB
ANION GAP SERPL CALC-SCNC: 12 MMOL/L
BUN SERPL-MCNC: 40 MG/DL (ref 8–25)
CALCIUM SERPL-MCNC: 8.5 MG/DL (ref 8.5–10.4)
CHLORIDE SERPL-SCNC: 107 MMOL/L (ref 97–107)
CO2 SERPL-SCNC: 16 MMOL/L (ref 24–31)
CREAT SERPL-MCNC: 2.5 MG/DL (ref 0.4–1.6)
EGFRCR SERPLBLD CKD-EPI 2021: 25 ML/MIN/1.73M*2
ERYTHROCYTE [DISTWIDTH] IN BLOOD BY AUTOMATED COUNT: 12 % (ref 11.5–14.5)
GLUCOSE SERPL-MCNC: 188 MG/DL (ref 65–99)
HCT VFR BLD AUTO: 34.1 % (ref 41–52)
HGB BLD-MCNC: 11.4 G/DL (ref 13.5–17.5)
MAGNESIUM SERPL-MCNC: 2.1 MG/DL (ref 1.6–3.1)
MCH RBC QN AUTO: 28.6 PG (ref 26–34)
MCHC RBC AUTO-ENTMCNC: 33.4 G/DL (ref 32–36)
MCV RBC AUTO: 86 FL (ref 80–100)
NRBC BLD-RTO: 0 /100 WBCS (ref 0–0)
PHOSPHATE SERPL-MCNC: 2.8 MG/DL (ref 2.5–4.5)
PLATELET # BLD AUTO: 223 X10*3/UL (ref 150–450)
POTASSIUM SERPL-SCNC: 4.6 MMOL/L (ref 3.4–5.1)
RBC # BLD AUTO: 3.98 X10*6/UL (ref 4.5–5.9)
SODIUM SERPL-SCNC: 135 MMOL/L (ref 133–145)
WBC # BLD AUTO: 13 X10*3/UL (ref 4.4–11.3)

## 2024-06-22 PROCEDURE — 82374 ASSAY BLOOD CARBON DIOXIDE: CPT | Performed by: NURSE PRACTITIONER

## 2024-06-22 PROCEDURE — 85027 COMPLETE CBC AUTOMATED: CPT | Performed by: NURSE PRACTITIONER

## 2024-06-22 PROCEDURE — 84100 ASSAY OF PHOSPHORUS: CPT

## 2024-06-22 PROCEDURE — 99291 CRITICAL CARE FIRST HOUR: CPT

## 2024-06-22 PROCEDURE — 2500000004 HC RX 250 GENERAL PHARMACY W/ HCPCS (ALT 636 FOR OP/ED)

## 2024-06-22 PROCEDURE — 2500000002 HC RX 250 W HCPCS SELF ADMINISTERED DRUGS (ALT 637 FOR MEDICARE OP, ALT 636 FOR OP/ED): Performed by: NURSE PRACTITIONER

## 2024-06-22 PROCEDURE — 2500000004 HC RX 250 GENERAL PHARMACY W/ HCPCS (ALT 636 FOR OP/ED): Performed by: NURSE PRACTITIONER

## 2024-06-22 PROCEDURE — 97162 PT EVAL MOD COMPLEX 30 MIN: CPT | Mod: GP

## 2024-06-22 PROCEDURE — 2060000001 HC INTERMEDIATE ICU ROOM DAILY

## 2024-06-22 PROCEDURE — 2500000001 HC RX 250 WO HCPCS SELF ADMINISTERED DRUGS (ALT 637 FOR MEDICARE OP): Performed by: NURSE PRACTITIONER

## 2024-06-22 PROCEDURE — 2500000005 HC RX 250 GENERAL PHARMACY W/O HCPCS: Performed by: NURSE PRACTITIONER

## 2024-06-22 PROCEDURE — 37799 UNLISTED PX VASCULAR SURGERY: CPT | Performed by: NURSE PRACTITIONER

## 2024-06-22 PROCEDURE — 99024 POSTOP FOLLOW-UP VISIT: CPT | Performed by: NURSE PRACTITIONER

## 2024-06-22 PROCEDURE — 83735 ASSAY OF MAGNESIUM: CPT

## 2024-06-22 PROCEDURE — 2500000001 HC RX 250 WO HCPCS SELF ADMINISTERED DRUGS (ALT 637 FOR MEDICARE OP)

## 2024-06-22 RX ORDER — TRIAMCINOLONE ACETONIDE 1 MG/G
CREAM TOPICAL 2 TIMES DAILY
Status: DISCONTINUED | OUTPATIENT
Start: 2024-06-22 | End: 2024-06-23 | Stop reason: HOSPADM

## 2024-06-22 RX ORDER — HYDRALAZINE HYDROCHLORIDE 20 MG/ML
10 INJECTION INTRAMUSCULAR; INTRAVENOUS EVERY 4 HOURS PRN
Status: DISCONTINUED | OUTPATIENT
Start: 2024-06-22 | End: 2024-06-23 | Stop reason: HOSPADM

## 2024-06-22 RX ORDER — ALFUZOSIN HYDROCHLORIDE 10 MG/1
10 TABLET, EXTENDED RELEASE ORAL NIGHTLY
Status: DISCONTINUED | OUTPATIENT
Start: 2024-06-22 | End: 2024-06-23 | Stop reason: HOSPADM

## 2024-06-22 RX ORDER — ONDANSETRON HYDROCHLORIDE 2 MG/ML
4 INJECTION, SOLUTION INTRAVENOUS EVERY 6 HOURS PRN
Status: DISCONTINUED | OUTPATIENT
Start: 2024-06-22 | End: 2024-06-23 | Stop reason: HOSPADM

## 2024-06-22 RX ORDER — FINASTERIDE 5 MG/1
5 TABLET, FILM COATED ORAL NIGHTLY
Status: DISCONTINUED | OUTPATIENT
Start: 2024-06-22 | End: 2024-06-23 | Stop reason: HOSPADM

## 2024-06-22 ASSESSMENT — PAIN SCALES - GENERAL
PAINLEVEL_OUTOF10: 0 - NO PAIN

## 2024-06-22 ASSESSMENT — PAIN - FUNCTIONAL ASSESSMENT
PAIN_FUNCTIONAL_ASSESSMENT: 0-10

## 2024-06-22 ASSESSMENT — COGNITIVE AND FUNCTIONAL STATUS - GENERAL
WALKING IN HOSPITAL ROOM: A LITTLE
MOVING TO AND FROM BED TO CHAIR: A LITTLE
MOBILITY SCORE: 19
STANDING UP FROM CHAIR USING ARMS: A LITTLE
TURNING FROM BACK TO SIDE WHILE IN FLAT BAD: A LITTLE
CLIMB 3 TO 5 STEPS WITH RAILING: A LITTLE

## 2024-06-22 ASSESSMENT — ACTIVITIES OF DAILY LIVING (ADL): ADL_ASSISTANCE: INDEPENDENT

## 2024-06-22 NOTE — PROGRESS NOTES
Spiritual Care Visit    Clinical Encounter Type  Visited With: Patient and family together  Routine Visit: Introduction  Continue Visiting: Yes         Values/Beliefs  Spiritual Requests During Hospitalization: Anointed today    Sacramental Encounters  Sacrament of Sick-Anointing: Anointed     Oseas Pulido

## 2024-06-22 NOTE — PROGRESS NOTES
Patient seen this evening having had a CT scan of the head for new onset left sided headache which penetrated his left cranium and his eyesocket according to the patient. This improved with blood pressure control and we also started decadron for presumed hyper perfusion syndrome. symptoms of all resolved awaiting final of CT scan. Neurological intact.

## 2024-06-22 NOTE — PROGRESS NOTES
Physical Therapy    Physical Therapy Evaluation    Patient Name: Charles Chan  MRN: [id number]  Today's Date: 6/22/2024   Time Calculation  Start Time: 0919  Stop Time: 0941  Time Calculation (min): 22 min    Assessment/Plan   PT Assessment  PT Assessment Results: Decreased endurance, Decreased mobility, Obesity  Rehab Prognosis: Good  Evaluation/Treatment Tolerance: Patient tolerated treatment well  Medical Staff Made Aware: Yes  Strengths: Ability to acquire knowledge, Premorbid level of function, Support of Caregivers  Barriers to Participation: Comorbidities  End of Session Communication: Bedside nurse  Assessment Comment: Pt is functioning below baseline level of function and would benefit from ongoing skilled PT while in acute hospital to progress toward PLOF as medically appropriate. Pt may benefit from low intensity rehab upon discharge to maximize safety and function upon return home.  End of Session Patient Position: Up in chair (call light in reach, spouse present, RN aware)  IP OR SWING BED PT PLAN  Inpatient or Swing Bed: Inpatient  PT Plan  Treatment/Interventions: Bed mobility, Transfer training, Gait training, Stair training, Balance training, Neuromuscular re-education, Endurance training, Therapeutic exercise, Therapeutic activity, Range of motion, Strengthening, Postural re-education, Home exercise program (avoid strength training using resistance at this time d/t post-op precautions)  PT Plan: Ongoing PT  PT Frequency: 4 times per week  PT Discharge Recommendations: Low intensity level of continued care  Equipment Recommended upon Discharge: Wheeled walker  PT Recommended Transfer Status: Assist x1  PT - OK to Discharge: Yes      Subjective   General Visit Information:  General  Reason for Referral: recent surgery  Referred By: Dr. Vo  Past Medical History Relevant to Rehab: PMHx notable for HTN, stable AAA 4.8cm, CAD, PAD, PVD, LE venous statis and cellulitis, CKD stage 2 with baseline Scr  2.0, and GERD.  Missed Visit: No  Family/Caregiver Present: Yes (spouse)  Prior to Session Communication: Bedside nurse (RN clears pt for participation)  Patient Position Received: Bed, 3 rail up, Alarm off, not on at start of session  Preferred Learning Style: auditory  General Comment: 79 y.o. male presenting with bilateral carotid artery stenosis now s/p right carotid enterectomy 6/21/24 with Dr. Botello. CT Head done post op due to right sided headache showed no acute intracranial process.  Home Living:  Home Living  Type of Home: House  Lives With: Spouse (great grandchildren)  Home Adaptive Equipment: Walker rolling or standard  Home Layout: Multi-level, Full bath main level, Able to live on main level with bedroom/bathroom  Home Access: Stairs to enter with rails  Entrance Stairs-Rails: Both  Entrance Stairs-Number of Steps: 4  Home Living Comments: spouse is able to provide 24/7 sup  Prior Level of Function:  Prior Function Per Pt/Caregiver Report  Level of [place]: Independent with ADLs and functional transfers, Independent with homemaking with ambulation  ADL Assistance: Independent  Homemaking Assistance: Independent  Ambulatory Assistance: Independent  Precautions:  Precautions  Medical Precautions: Fall precautions, Vascular precautions (arterial line)  Post-Surgical Precautions:  (BP goals -140mmHg per Dr. Botello.)  Vital Signs:  Vital Signs  Heart Rate: 95  Heart Rate Source: Monitor  SpO2: 97 %  BP: 142/60  BP Method: Arterial line  Patient Position: Sitting    Objective   Pain:  Pain Assessment  Pain Assessment: 0-10  0-10 (Numeric) Pain Score: 0 - No pain  Cognition:  Cognition  Overall Cognitive Status: Within Functional Limits  Orientation Level: Oriented X4  Insight: Within function limits    General Assessments:     Activity Tolerance  Endurance: Tolerates less than 10 min exercise, no significant change in vital signs  Activity Tolerance Comments: intensity of session is intentionally  kept low today d/t medical precautions    Sensation  Sensation Comment: Pt denies parasthesias    Strength  Strength Comments: grossly 4+/5  Strength  Strength Comments: grossly 4+/5       Coordination  Movements are Fluid and Coordinated: Yes  Coordination Comment: BLEs WNL assessed via function    Postural Control  Postural Control: Within Functional Limits    Static Sitting Balance  Static Sitting-Balance Support: No upper extremity supported, Feet supported  Static Sitting-Level of Assistance: Independent  Static Sitting-Comment/Number of Minutes:  (edge of bed)    Static Standing Balance  Static Standing-Balance Support: No upper extremity supported  Static Standing-Level of Assistance: Close supervision  Static Standing-Comment/Number of Minutes: 1 minute  Functional Assessments:  Bed Mobility  Bed Mobility: Yes  Bed Mobility 1  Bed Mobility 1: Supine to sitting  Level of Assistance 1: Close supervision  Bed Mobility Comments 1: HOB elevated, SBA for safety and line/tube management    Transfers  Transfer: Yes  Transfer 1  Transfer From 1: Bed to  Transfer to 1: Chair with arms  Technique 1: Stand pivot  Transfer Device 1:  (pt declines to use FWW as suggested by therapist)  Transfer Level of Assistance 1: Contact guard  Trials/Comments 1: CGA for safety and management of lines/tubes    Ambulation/Gait Training  Ambulation/Gait Training Performed: No (gait training is deferred this session d/t medical complexity)  Extremity/Trunk Assessments:        RLE   RLE : Within Functional Limits  LLE   LLE : Within Functional Limits  Outcome Measures:  [facility] Basic Mobility  Turning from your back to your side while in a flat bed without using bedrails: None  Moving from lying on your back to sitting on the side of a flat bed without using bedrails: A little  Moving to and from bed to chair (including a wheelchair): A little  Standing up from a chair using your arms (e.g. wheelchair or bedside chair): A little  To walk in  hospital room: A little  Climbing 3-5 steps with railing: A little  Basic Mobility - Total Score: 19    Encounter Problems       Encounter Problems (Active)       PT Problem       Patient will ambulate 200' distance using no AD with independent (Progressing)       Start:  06/22/24    Expected End:  07/06/24            Patient will ascend and descend 1 flight of stairs with rail mod independent (Progressing)       Start:  06/22/24    Expected End:  07/06/24               Pain - Adult              Education Documentation  No documentation found.  Education Comments  No comments found.

## 2024-06-22 NOTE — PROGRESS NOTES
"Charles Chan is a 79 y.o. male on day 1 of admission presenting with Carotid stenosis, bilateral.    Subjective      Patient seen and examined.  He is postoperative day 1 from a carotid endarterectomy.  Patient developed some head pressure and felt like his \"eye was going to be sucked in\" after surgery yesterday.  The patient had a stat head CT which was negative.  He was also initiated on IV Decadron.  Patient also had a neck hematoma noted postoperatively.    Objective     Physical Exam    General: Pt is alert and oriented x 3. Pleasant, conversive  HEENT: Head is atraumatic, normocephalic.  Right neck hematoma soft and not taut.  Cardiac: Normal S1-S2.  Regular rate and rhythm.  No murmurs.  Respiratory: Lungs clear to auscultation.  No adventitious sounds.  Abdomen: Soft, nondistended, nontender.  Bowel sounds x4 quadrants.  Pulse exam: Palpable brachial and radial pulses bilaterall.  Warm and well-perfused.  Extremities: No significant edema noted.  Extremities are warm to the touch and normal in color.  No open wounds or sores.  Neuro: Moves all extremities spontaneously.  No focal deficits.   strength is 5+ bilaterally.  Smile is symmetrical.  Tongue is midline.  Dorsiflexion and plantarflexion are 5+ bilaterally.  Psych: Appropriate affect.  Answers questions appropriately.    Last Recorded Vitals  Blood pressure 158/68, pulse 88, temperature 36.7 °C (98.1 °F), temperature source Oral, resp. rate 18, height 1.829 m (6'), weight 115 kg (253 lb 4.9 oz), SpO2 92%.  Intake/Output last 3 Shifts:  I/O last 3 completed shifts:  In: 2140.4 (18.6 mL/kg) [P.O.:480; I.V.:1460.4 (12.7 mL/kg); IV Piggyback:200]  Out: 750 (6.5 mL/kg) [Urine:400 (0.1 mL/kg/hr); Blood:350]  Weight: 114.9 kg     Relevant Results    Scheduled medications  alfuzosin, 10 mg, oral, Nightly  amLODIPine, 10 mg, oral, Daily  aspirin, 81 mg, oral, Daily  cholecalciferol, 5,000 Units, oral, Daily  clopidogrel, 75 mg, oral, Daily  famotidine, " 20 mg, oral, BID  finasteride, 5 mg, oral, Nightly  metoprolol succinate XL, 25 mg, oral, Daily  oxygen, , inhalation, Continuous - Inhalation  pantoprazole, 40 mg, oral, Daily  rosuvastatin, 40 mg, oral, Nightly  sennosides-docusate sodium, 1 tablet, oral, BID      Continuous medications     PRN medications  PRN medications: acetaminophen, hydrALAZINE, HYDROmorphone, naloxone, naloxone, naloxone, oxyCODONE-acetaminophen     Results for orders placed or performed during the hospital encounter of 06/21/24 (from the past 24 hour(s))   CBC and Auto Differential   Result Value Ref Range    WBC 10.3 4.4 - 11.3 x10*3/uL    nRBC 0.0 0.0 - 0.0 /100 WBCs    RBC 4.09 (L) 4.50 - 5.90 x10*6/uL    Hemoglobin 11.8 (L) 13.5 - 17.5 g/dL    Hematocrit 35.4 (L) 41.0 - 52.0 %    MCV 87 80 - 100 fL    MCH 28.9 26.0 - 34.0 pg    MCHC 33.3 32.0 - 36.0 g/dL    RDW 12.1 11.5 - 14.5 %    Platelets 236 150 - 450 x10*3/uL    Neutrophils % 93.8 40.0 - 80.0 %    Immature Granulocytes %, Automated 0.6 0.0 - 0.9 %    Lymphocytes % 4.6 13.0 - 44.0 %    Monocytes % 0.8 2.0 - 10.0 %    Eosinophils % 0.0 0.0 - 6.0 %    Basophils % 0.2 0.0 - 2.0 %    Neutrophils Absolute 9.64 (H) 1.60 - 5.50 x10*3/uL    Immature Granulocytes Absolute, Automated 0.06 0.00 - 0.50 x10*3/uL    Lymphocytes Absolute 0.47 (L) 0.80 - 3.00 x10*3/uL    Monocytes Absolute 0.08 0.05 - 0.80 x10*3/uL    Eosinophils Absolute 0.00 0.00 - 0.40 x10*3/uL    Basophils Absolute 0.02 0.00 - 0.10 x10*3/uL   Basic Metabolic Panel   Result Value Ref Range    Glucose 238 (H) 65 - 99 mg/dL    Sodium 134 133 - 145 mmol/L    Potassium 4.3 3.4 - 5.1 mmol/L    Chloride 104 97 - 107 mmol/L    Bicarbonate 15 (L) 24 - 31 mmol/L    Urea Nitrogen 36 (H) 8 - 25 mg/dL    Creatinine 2.50 (H) 0.40 - 1.60 mg/dL    eGFR 25 (L) >60 mL/min/1.73m*2    Calcium 8.5 8.5 - 10.4 mg/dL    Anion Gap 15 <=19 mmol/L   Magnesium   Result Value Ref Range    Magnesium 2.00 1.60 - 3.10 mg/dL   Phosphorus   Result Value Ref  Range    Phosphorus 2.8 2.5 - 4.5 mg/dL   Basic Metabolic Panel   Result Value Ref Range    Glucose 188 (H) 65 - 99 mg/dL    Sodium 135 133 - 145 mmol/L    Potassium 4.6 3.4 - 5.1 mmol/L    Chloride 107 97 - 107 mmol/L    Bicarbonate 16 (L) 24 - 31 mmol/L    Urea Nitrogen 40 (H) 8 - 25 mg/dL    Creatinine 2.50 (H) 0.40 - 1.60 mg/dL    eGFR 25 (L) >60 mL/min/1.73m*2    Calcium 8.5 8.5 - 10.4 mg/dL    Anion Gap 12 <=19 mmol/L   CBC   Result Value Ref Range    WBC 13.0 (H) 4.4 - 11.3 x10*3/uL    nRBC 0.0 0.0 - 0.0 /100 WBCs    RBC 3.98 (L) 4.50 - 5.90 x10*6/uL    Hemoglobin 11.4 (L) 13.5 - 17.5 g/dL    Hematocrit 34.1 (L) 41.0 - 52.0 %    MCV 86 80 - 100 fL    MCH 28.6 26.0 - 34.0 pg    MCHC 33.4 32.0 - 36.0 g/dL    RDW 12.0 11.5 - 14.5 %    Platelets 223 150 - 450 x10*3/uL   Magnesium   Result Value Ref Range    Magnesium 2.10 1.60 - 3.10 mg/dL   Phosphorus   Result Value Ref Range    Phosphorus 2.8 2.5 - 4.5 mg/dL       Assessment/Plan   Carotid stenosis  Hypertension  Hyperlipidemia  Neck hematoma, right    Patient initially had head pressure postoperatively.  Stat head CT was negative.  Was initiated on Decadron which has relieved his symptoms.  He is doing very well postoperatively an offers no complaints.  His Cardene drip was discontinued around 11 this morning.  Systolic blood pressure has been within goal for the most part.  Okay to transfer to stepdown.  Plan for discharge tomorrow.  Continue with PT OT.  Arterial line was discontinued earlier today.  Neck hematoma is stable.  Continue to monitor.             I spent 35 minutes in the professional and overall care of this patient.      Aj Ivy, APRN-CNP

## 2024-06-22 NOTE — PROGRESS NOTES
Critical Care Medicine       Date:  6/22/2024  Patient:  Charles Chan  YOB: 1945  MRN:  [id number]   Admit Date:  6/21/2024    No chief complaint on file.      Patient is a 79 y.o. male admitted on 6/21/2024  6:20 AM with the following indication(s) for ICU care s/p right carotid endarterectomy.   Hospital day 1 ICU day 21h     History of Present Illness:  Charles Chan is a 79 y.o. male presenting with bilateral carotid artery stenosis now s/p right carotid enterectomy 6/21/24 with Dr. Botello.  PMHx notable for HTN, stable AAA 4.8cm, CAD, PAD, PVD, LE venous statis and cellulitis, CKD stage 2 with baseline Scr 2.0, and GERD.  Pt presented from home for his procedure, was extubated in the OR and admitted to the ICU post op for close neurovascular and hemodynamic monitoring.       BP goals -140mmHg per Dr. Botello.       Interval ICU Events:  6/22: No acute events overnight.  Patient remains on nicardipine gtt.  Will try to wean off today after morning PO antihypertensives given.    Medical History:  Past Medical History:   Diagnosis Date    AAA (abdominal aortic aneurysm) (CMS-HCC)     Anemia     Arthritis     BPH (benign prostatic hyperplasia)     Carotid stenosis     Cellulitis     left leg    Chronic renal disease, stage 2, mildly decreased glomerular filtration rate (GFR) between 60-89 mL/min/1.73 square meter     CKD (chronic kidney disease), stage III (Multi)     Coronary artery disease     Environmental allergies     GERD (gastroesophageal reflux disease)     High cholesterol     HTN (hypertension)     Hyperlipemia     Hypertension     Hypothyroidism     Overweight     Personal history of other diseases of the digestive system 10/27/2021    History of gastroesophageal reflux (GERD)    Prediabetes     Venous insufficiency of both lower extremities     Venous stasis dermatitis of both lower extremities      Past Surgical History:   Procedure Laterality Date    APPENDECTOMY  06/13/2013     Appendectomy    CARDIAC CATHETERIZATION      2 stents placed    COLON SURGERY  2013    partial colectomy for diverticular disease    CORONARY ANGIOPLASTY      GALLBLADDER SURGERY  2013    Gallbladder Surgery    VASCULAR SURGERY Right 2024    carotid endardectomy     Medications Prior to Admission   Medication Sig Dispense Refill Last Dose    alfuzosin (Uroxatral) 10 mg 24 hr tablet TAKE 1 TABLET BY MOUTH EVERY DAY 90 tablet 1 2024    amLODIPine (Norvasc) 10 mg tablet TAKE 1 TABLET BY MOUTH EVERY DAY 90 tablet 0 2024    ammonium lactate (Lac-Hydrin) 12 % lotion Apply and rub in a thin film to affected areas twice daily (am and pm)   Past Month    aspirin 325 mg tablet Take 1 tablet (325 mg) by mouth once daily.   2024    bacitracin 500 unit/gram ointment Apply topically 2 times a day. 14 g 0 Past Week    cephalexin (Keflex) 500 mg capsule Take 1 capsule (500 mg) by mouth 3 times a day. 45 capsule 0 2024    [] chlorhexidine (Peridex) 0.12 % solution Use as directed. 473 mL 0 2024    cholecalciferol (Vitamin D-3) 5,000 Units tablet TAKE 1 TABLET BY MOUTH EVERY DAY 90 tablet 2 Past Week    clopidogrel (Plavix) 75 mg tablet TAKE 1 TABLET BY MOUTH EVERY DAY 90 tablet 0 2024    cyanocobalamin, vitamin B-12, (Vitamin B-12) 1,000 mcg tablet extended release Take 1 tablet (1,000 mcg) by mouth once daily. As directed   Past Week    econazole nitrate 1 % cream APPLY TO AFFECTED AREA EVERY DAY AS DIRECTED *30 DAY SUPPLY* 85 g 0 Past Week    famotidine (Pepcid) 20 mg tablet TAKE 1 TABLET BY MOUTH TWICE A  tablet 0 2024    finasteride (Proscar) 5 mg tablet TAKE 1 TABLET BY MOUTH EVERY DAY 90 tablet 0 2024    fluticasone (Flonase) 50 mcg/actuation nasal spray USE 1 SPRAY EVERY DAY IN EACH NOSTRIL *LASTS 60 DAYS* 48 mL 0 Past Month    metoprolol succinate XL (Toprol-XL) 25 mg 24 hr tablet Take 1 tablet (25 mg) by mouth once daily. DO NOT CRUSH OR CHEW 90  tablet 1 2024    pantoprazole (ProtoNix) 40 mg EC tablet TAKE 1 TABLET BY MOUTH ONCE DAILY IN THE MORNING BEFORE A MEAL. DO NOT CRUSH, CHEW OR SPLIT 90 tablet 0 2024    rosuvastatin (Crestor) 40 mg tablet Take 1 tablet (40 mg) by mouth once daily. 90 tablet 1 2024    triamcinolone (Kenalog) 0.1 % cream APPLY TO AFFECTED AREA TWICE A DAY 80 g 0 Past Week    salmeterol (Serevent Diskus) 50 mcg/dose diskus inhaler Inhale 1 puff 2 times a day. (Patient not taking: Reported on 2024) 1 each 0 Not Taking     Patient has no known allergies.  Social History     Tobacco Use    Smoking status: Former     Current packs/day: 0.00     Average packs/day: 1 pack/day for 30.0 years (30.0 ttl pk-yrs)     Types: Cigarettes     Start date:      Quit date:      Years since quittin.4    Smokeless tobacco: Never   Vaping Use    Vaping status: Never Used   Substance Use Topics    Alcohol use: Not Currently    Drug use: Never     Family History   Problem Relation Name Age of Onset    Hypertension Mother      Heart disease Mother      Stroke Father      Diabetes Other      Hypertension Other         Hospital Medications:    niCARdipine, 2.5-15 mg/hr, Last Rate: 5 mg/hr (24 0639)          Current Facility-Administered Medications:     acetaminophen (Tylenol) tablet 650 mg, 650 mg, oral, q4h PRN, SAVANNAH Lindsay, 650 mg at 24 1445    alfuzosin (Uroxatral) 24 hr tablet 10 mg, 10 mg, oral, Nightly, SAVANNAH Rodriguez    amLODIPine (Norvasc) tablet 10 mg, 10 mg, oral, Daily, SAVANNAH Lindsay, 10 mg at 24 0855    aspirin chewable tablet 81 mg, 81 mg, oral, Daily, SAVANNAH Lindsay, 81 mg at 24 0855    cholecalciferol (Vitamin D-3) capsule 125 mcg, 5,000 Units, oral, Daily, SAVANNAH Lindsay, 125 mcg at 24 0856    clopidogrel (Plavix) tablet 75 mg, 75 mg, oral, Daily, KATEY Lindsay-CNP, 75 mg at 24 0855    famotidine (Pepcid)  tablet 20 mg, 20 mg, oral, BID, KATEY Lindsay-CNP, 20 mg at 06/22/24 0856    finasteride (Proscar) tablet 5 mg, 5 mg, oral, Nightly, SAVANNAH Rodriguez    hydrALAZINE (Apresoline) injection 5 mg, 5 mg, intravenous, q4h PRN, David Farooq PA-C, 5 mg at 06/21/24 2152    HYDROmorphone (Dilaudid) injection 0.5 mg, 0.5 mg, intravenous, q4h PRN, KATEY Lindsay-CNP    lactated Ringer's bolus 500 mL, 500 mL, intravenous, Once, SAVANNAH Rodriguez    metoprolol succinate XL (Toprol-XL) 24 hr tablet 25 mg, 25 mg, oral, Daily, KATEY Lindsay-CNP, 25 mg at 06/22/24 0855    naloxone (Narcan) injection 0.2 mg, 0.2 mg, intravenous, q5 min PRN, SAVANNAH Lindsay    naloxone (Narcan) injection 0.2 mg, 0.2 mg, intravenous, q5 min PRN, SAVANNAH Lindsay    naloxone (Narcan) injection 0.2 mg, 0.2 mg, intravenous, q5 min PRN, KATEY Lindsay-CNP    niCARdipine (Cardene) 40 mg in sodium chloride 200 mL (0.2 mg/mL) infusion (premix), 2.5-15 mg/hr, intravenous, Continuous, SAVANNAH Lindsay, Last Rate: 25 mL/hr at 06/22/24 0639, 5 mg/hr at 06/22/24 0639    oxyCODONE-acetaminophen (Percocet) 5-325 mg per tablet 1 tablet, 1 tablet, oral, q4h PRN, SAVANNAH Lindsay, 1 tablet at 06/21/24 1627    oxygen (O2) therapy, , inhalation, Continuous - Inhalation, SAVANNAH Lindsay, 2 L/min at 06/21/24 2000    pantoprazole (ProtoNix) EC tablet 40 mg, 40 mg, oral, Daily, SAVANNAH Lindsay, 40 mg at 06/22/24 0855    rosuvastatin (Crestor) tablet 40 mg, 40 mg, oral, Nightly, SAVANNAH Lindsay    sennosides-docusate sodium (Faith-Colace) 8.6-50 mg per tablet 1 tablet, 1 tablet, oral, BID, SAVANNAH Lindsay, 1 tablet at 06/22/24 0856    Review of Systems:  14 point review of systems was completed and negative except for those specially mention in my HPI    Physical Exam:    Heart Rate:  [69-96]   Temp:  [36 °C (96.8 °F)-36.6 °C (97.9 °F)]   Resp:  [12-24]    BP: (113-173)/(48-78)   Height:  [182.9 cm (6')]   Weight:  [115 kg (253 lb 4.9 oz)-122 kg (269 lb 13.5 oz)]   SpO2:  [87 %-98 %]     Physical Exam  Constitutional:       General: He is awake. He is not in acute distress.  HENT:      Head: Normocephalic and atraumatic.   Eyes:      Extraocular Movements: Extraocular movements intact.      Conjunctiva/sclera: Conjunctivae normal.   Neck:      Comments: DSG to right neck dry and intact  Cardiovascular:      Rate and Rhythm: Normal rate and regular rhythm.      Pulses:           Radial pulses are 2+ on the right side and 2+ on the left side.        Dorsalis pedis pulses are 2+ on the right side and 2+ on the left side.   Pulmonary:      Breath sounds: Normal breath sounds.   Abdominal:      General: Abdomen is protuberant. Bowel sounds are decreased.      Palpations: Abdomen is soft.   Musculoskeletal:      Right lower leg: No edema.      Left lower leg: No edema.      Comments: 5/5 strength to all extremities   Skin:     General: Skin is warm and dry.      Capillary Refill: Capillary refill takes less than 2 seconds.      Comments: BLE reddened   Neurological:      General: No focal deficit present.      Mental Status: He is alert and oriented to person, place, and time.      GCS: GCS eye subscore is 4. GCS verbal subscore is 5. GCS motor subscore is 6.      Cranial Nerves: Cranial nerves 2-12 are intact.   Psychiatric:         Behavior: Behavior is cooperative.         Objective:    I have reviewed all medications, laboratory results, and imaging pertinent for today's encounter.      Intake/Output Summary (Last 24 hours) at 6/22/2024 0908  Last data filed at 6/22/2024 0800  Gross per 24 hour   Intake 2140.42 ml   Output 1100 ml   Net 1040.42 ml       Results for orders placed or performed during the hospital encounter of 06/21/24 (from the past 24 hour(s))   CBC and Auto Differential   Result Value Ref Range    WBC 10.3 4.4 - 11.3 x10*3/uL    nRBC 0.0 0.0 - 0.0  /100 WBCs    RBC 4.09 (L) 4.50 - 5.90 x10*6/uL    Hemoglobin 11.8 (L) 13.5 - 17.5 g/dL    Hematocrit 35.4 (L) 41.0 - 52.0 %    MCV 87 80 - 100 fL    MCH 28.9 26.0 - 34.0 pg    MCHC 33.3 32.0 - 36.0 g/dL    RDW 12.1 11.5 - 14.5 %    Platelets 236 150 - 450 x10*3/uL    Neutrophils % 93.8 40.0 - 80.0 %    Immature Granulocytes %, Automated 0.6 0.0 - 0.9 %    Lymphocytes % 4.6 13.0 - 44.0 %    Monocytes % 0.8 2.0 - 10.0 %    Eosinophils % 0.0 0.0 - 6.0 %    Basophils % 0.2 0.0 - 2.0 %    Neutrophils Absolute 9.64 (H) 1.60 - 5.50 x10*3/uL    Immature Granulocytes Absolute, Automated 0.06 0.00 - 0.50 x10*3/uL    Lymphocytes Absolute 0.47 (L) 0.80 - 3.00 x10*3/uL    Monocytes Absolute 0.08 0.05 - 0.80 x10*3/uL    Eosinophils Absolute 0.00 0.00 - 0.40 x10*3/uL    Basophils Absolute 0.02 0.00 - 0.10 x10*3/uL   Basic Metabolic Panel   Result Value Ref Range    Glucose 238 (H) 65 - 99 mg/dL    Sodium 134 133 - 145 mmol/L    Potassium 4.3 3.4 - 5.1 mmol/L    Chloride 104 97 - 107 mmol/L    Bicarbonate 15 (L) 24 - 31 mmol/L    Urea Nitrogen 36 (H) 8 - 25 mg/dL    Creatinine 2.50 (H) 0.40 - 1.60 mg/dL    eGFR 25 (L) >60 mL/min/1.73m*2    Calcium 8.5 8.5 - 10.4 mg/dL    Anion Gap 15 <=19 mmol/L   Magnesium   Result Value Ref Range    Magnesium 2.00 1.60 - 3.10 mg/dL   Phosphorus   Result Value Ref Range    Phosphorus 2.8 2.5 - 4.5 mg/dL   Basic Metabolic Panel   Result Value Ref Range    Glucose 188 (H) 65 - 99 mg/dL    Sodium 135 133 - 145 mmol/L    Potassium 4.6 3.4 - 5.1 mmol/L    Chloride 107 97 - 107 mmol/L    Bicarbonate 16 (L) 24 - 31 mmol/L    Urea Nitrogen 40 (H) 8 - 25 mg/dL    Creatinine 2.50 (H) 0.40 - 1.60 mg/dL    eGFR 25 (L) >60 mL/min/1.73m*2    Calcium 8.5 8.5 - 10.4 mg/dL    Anion Gap 12 <=19 mmol/L   CBC   Result Value Ref Range    WBC 13.0 (H) 4.4 - 11.3 x10*3/uL    nRBC 0.0 0.0 - 0.0 /100 WBCs    RBC 3.98 (L) 4.50 - 5.90 x10*6/uL    Hemoglobin 11.4 (L) 13.5 - 17.5 g/dL    Hematocrit 34.1 (L) 41.0 - 52.0 %     MCV 86 80 - 100 fL    MCH 28.6 26.0 - 34.0 pg    MCHC 33.4 32.0 - 36.0 g/dL    RDW 12.0 11.5 - 14.5 %    Platelets 223 150 - 450 x10*3/uL   Magnesium   Result Value Ref Range    Magnesium 2.10 1.60 - 3.10 mg/dL   Phosphorus   Result Value Ref Range    Phosphorus 2.8 2.5 - 4.5 mg/dL       Assessment/Plan:    I am currently managing this critically ill patient for the following problems:    Neuro/Psych/Pain Ctrl/Sedation:  #Post op pain  -CT Head done post op due to right sided headache showed no acute intracranial process  -Neuro checks per vascular protocol  -Patient is A&Ox's 3  -Completed Decadron 10mg x's 4 doses  -Pain control: PRN tylenol, percocet, dilaudid  -Monitor CAM-ICU    Respiratory/ENT:  -Currently on RA  -Continuous pulse oximetry, maintain SPO2>92%  -Encourage IS  -OOB to chair    Cardiovascular:  #Bilateral carotid stenosis, POD #1 s/p R carotid endarterectomy (Dr Botello)  #Hx HTN, stable AAA 4.8 cm, CAD, PAD, PVD  -Continuous cardiac monitoring  -Maintain -140  -Continue nicardipine gtt to maintain SBP goal, wean as able  -Continue home toprol and amlodipine  -Resume home asa, hold plavix until seen by vascular sx due to hematoma  -Monitor and optimize electrolytes, keep K>4, Mg>2    GI:  #GERD  -Diet: Regular  -GI prophylaxis: protonix and pepcid  -Bowel regimen: pericolace  -Last BM: 6/20    Renal/Volume Status (Intra & Extravascular):  #SHERRY on CKD  -Baseline Cr ~2-2.2  -Cr 2.5 today  -Urine output 400cc/24 hours  -500cc bolus LR x's 1  -Daily RFP, Mg, Phos  -Continue home alfuzosin and finasteride  -Replace electrolytes PRN    Endocrine  -Monitor blood glucose daily with labs and PRN   -Goal BS<180  -Hypoglycemic protocol    Infectious Disease:  #Leukocytosis likely reactive  -Tmax 36.6  -WBC 10>13  -Completed cefazolin lore op  -MRSA negative    Heme/Onc:  #Chronic anemia  -HH: 11.4/34.1  -Daily CBC  -Monitor for s/s of bleeding  -VTE prophylaxis: SCDs    Derm/MSK:  -Activity as  tolerated  -ICU skin protocol  -PT consulted    Ethics/Code Status:  -Full code    Lines/Reyes/Restranits:  CVC: no  Fairchance: Left radial, day 1  Reyes: no  Restraints: no    Dispo: ICU, tx to stepdown when able to be weaned off cardene gtt    Plan discussed with: Dr Vo  Critical Care Time:  40 minutes      KATEY Rodriguez-CNP  Pulmonary and Critical Care Medicine

## 2024-06-22 NOTE — CARE PLAN
The patient's goals for the shift include      The clinical goals for the shift include; patient will remain off of cardene drip and remain hemodynamically stable.      Problem: Skin  Goal: Decreased wound size/increased tissue granulation at next dressing change  Outcome: Progressing  Flowsheets (Taken 6/22/2024 1938)  Decreased wound size/increased tissue granulation at next dressing change:   Promote sleep for wound healing   Utilize specialty bed per algorithm   Protective dressings over bony prominences  Goal: Participates in plan/prevention/treatment measures  Outcome: Progressing  Flowsheets (Taken 6/22/2024 1938)  Participates in plan/prevention/treatment measures:   Discuss with provider PT/OT consult   Elevate heels   Increase activity/out of bed for meals  Goal: Prevent/manage excess moisture  Outcome: Progressing  Flowsheets (Taken 6/22/2024 1938)  Prevent/manage excess moisture:   Cleanse incontinence/protect with barrier cream   Moisturize dry skin   Use wicking fabric (obtain order)   Follow provider orders for dressing changes   Monitor for/manage infection if present  Goal: Prevent/minimize sheer/friction injuries  Outcome: Progressing  Flowsheets (Taken 6/22/2024 1938)  Prevent/minimize sheer/friction injuries:   Complete micro-shifts as needed if patient unable. Adjust patient position to relieve pressure points, not a full turn   Increase activity/out of bed for meals   Use pull sheet   HOB 30 degrees or less   Turn/reposition every 2 hours/use positioning/transfer devices   Utilize specialty bed per algorithm  Goal: Promote/optimize nutrition  Outcome: Progressing  Flowsheets (Taken 6/22/2024 1938)  Promote/optimize nutrition:   Assist with feeding   Monitor/record intake including meals   Offer water/supplements/favorite foods   Discuss with provider if NPO > 2 days   Reassess MST if dietician not consulted   Consume > 50% meals/supplements  Goal: Promote skin healing  Outcome:  Progressing  Flowsheets (Taken 6/22/2024 1938)  Promote skin healing:   Assess skin/pad under line(s)/device(s)   Protective dressings over bony prominences   Turn/reposition every 2 hours/use positioning/transfer devices   Ensure correct size (line/device) and apply per  instructions   Rotate device position/do not position patient on device     Problem: Pain  Goal: Takes deep breaths with improved pain control throughout the shift  Outcome: Progressing  Goal: Turns in bed with improved pain control throughout the shift  Outcome: Progressing  Goal: Walks with improved pain control throughout the shift  Outcome: Progressing  Goal: Performs ADL's with improved pain control throughout shift  Outcome: Progressing  Goal: Participates in PT with improved pain control throughout the shift  Outcome: Progressing  Goal: Free from opioid side effects throughout the shift  Outcome: Progressing  Goal: Free from acute confusion related to pain meds throughout the shift  Outcome: Progressing     Problem: Pain - Adult  Goal: Verbalizes/displays adequate comfort level or baseline comfort level  Outcome: Progressing     Problem: Safety - Adult  Goal: Free from fall injury  Outcome: Progressing     Problem: Discharge Planning  Goal: Discharge to home or other facility with appropriate resources  Outcome: Progressing     Problem: Chronic Conditions and Co-morbidities  Goal: Patient's chronic conditions and co-morbidity symptoms are monitored and maintained or improved  Outcome: Progressing

## 2024-06-23 VITALS
TEMPERATURE: 97.5 F | HEIGHT: 72 IN | SYSTOLIC BLOOD PRESSURE: 158 MMHG | DIASTOLIC BLOOD PRESSURE: 85 MMHG | HEART RATE: 82 BPM | OXYGEN SATURATION: 95 % | WEIGHT: 271.39 LBS | RESPIRATION RATE: 18 BRPM | BODY MASS INDEX: 36.76 KG/M2

## 2024-06-23 LAB
ANION GAP SERPL CALC-SCNC: 13 MMOL/L
ATRIAL RATE: 71 BPM
BUN SERPL-MCNC: 55 MG/DL (ref 8–25)
CALCIUM SERPL-MCNC: 8.7 MG/DL (ref 8.5–10.4)
CHLORIDE SERPL-SCNC: 103 MMOL/L (ref 97–107)
CO2 SERPL-SCNC: 18 MMOL/L (ref 24–31)
CREAT SERPL-MCNC: 2.7 MG/DL (ref 0.4–1.6)
EGFRCR SERPLBLD CKD-EPI 2021: 23 ML/MIN/1.73M*2
ERYTHROCYTE [DISTWIDTH] IN BLOOD BY AUTOMATED COUNT: 12.6 % (ref 11.5–14.5)
GLUCOSE SERPL-MCNC: 163 MG/DL (ref 65–99)
HCT VFR BLD AUTO: 33.2 % (ref 41–52)
HGB BLD-MCNC: 10.9 G/DL (ref 13.5–17.5)
MAGNESIUM SERPL-MCNC: 2.3 MG/DL (ref 1.6–3.1)
MCH RBC QN AUTO: 28.8 PG (ref 26–34)
MCHC RBC AUTO-ENTMCNC: 32.8 G/DL (ref 32–36)
MCV RBC AUTO: 88 FL (ref 80–100)
NRBC BLD-RTO: 0 /100 WBCS (ref 0–0)
P AXIS: 33 DEGREES
P OFFSET: 169 MS
P ONSET: 116 MS
PHOSPHATE SERPL-MCNC: 2.8 MG/DL (ref 2.5–4.5)
PLATELET # BLD AUTO: 234 X10*3/UL (ref 150–450)
POTASSIUM SERPL-SCNC: 4.7 MMOL/L (ref 3.4–5.1)
PR INTERVAL: 206 MS
Q ONSET: 219 MS
QRS COUNT: 12 BEATS
QRS DURATION: 86 MS
QT INTERVAL: 412 MS
QTC CALCULATION(BAZETT): 447 MS
QTC FREDERICIA: 436 MS
R AXIS: -5 DEGREES
RBC # BLD AUTO: 3.79 X10*6/UL (ref 4.5–5.9)
SODIUM SERPL-SCNC: 134 MMOL/L (ref 133–145)
T AXIS: 15 DEGREES
T OFFSET: 425 MS
VENTRICULAR RATE: 71 BPM
WBC # BLD AUTO: 19.8 X10*3/UL (ref 4.4–11.3)

## 2024-06-23 PROCEDURE — 83735 ASSAY OF MAGNESIUM: CPT

## 2024-06-23 PROCEDURE — 2500000004 HC RX 250 GENERAL PHARMACY W/ HCPCS (ALT 636 FOR OP/ED)

## 2024-06-23 PROCEDURE — 99239 HOSP IP/OBS DSCHRG MGMT >30: CPT | Performed by: NURSE PRACTITIONER

## 2024-06-23 PROCEDURE — 97530 THERAPEUTIC ACTIVITIES: CPT | Mod: GP

## 2024-06-23 PROCEDURE — 2500000001 HC RX 250 WO HCPCS SELF ADMINISTERED DRUGS (ALT 637 FOR MEDICARE OP)

## 2024-06-23 PROCEDURE — 84100 ASSAY OF PHOSPHORUS: CPT

## 2024-06-23 PROCEDURE — 82374 ASSAY BLOOD CARBON DIOXIDE: CPT

## 2024-06-23 PROCEDURE — 2500000001 HC RX 250 WO HCPCS SELF ADMINISTERED DRUGS (ALT 637 FOR MEDICARE OP): Performed by: INTERNAL MEDICINE

## 2024-06-23 PROCEDURE — 97116 GAIT TRAINING THERAPY: CPT | Mod: GP

## 2024-06-23 PROCEDURE — 36415 COLL VENOUS BLD VENIPUNCTURE: CPT

## 2024-06-23 PROCEDURE — 85027 COMPLETE CBC AUTOMATED: CPT

## 2024-06-23 RX ORDER — METOPROLOL SUCCINATE 25 MG/1
50 TABLET, EXTENDED RELEASE ORAL DAILY
Qty: 30 TABLET | Refills: 2 | Status: SHIPPED | OUTPATIENT
Start: 2024-06-23

## 2024-06-23 SDOH — HEALTH STABILITY: MENTAL HEALTH: HOW OFTEN DO YOU HAVE A DRINK CONTAINING ALCOHOL?: NEVER

## 2024-06-23 SDOH — SOCIAL STABILITY: SOCIAL NETWORK: ARE YOU MARRIED, WIDOWED, DIVORCED, SEPARATED, NEVER MARRIED, OR LIVING WITH A PARTNER?: MARRIED

## 2024-06-23 SDOH — ECONOMIC STABILITY: HOUSING INSECURITY: IN THE LAST 12 MONTHS, HOW MANY PLACES HAVE YOU LIVED?: 1

## 2024-06-23 SDOH — SOCIAL STABILITY: SOCIAL INSECURITY: WITHIN THE LAST YEAR, HAVE YOU BEEN AFRAID OF YOUR PARTNER OR EX-PARTNER?: NO

## 2024-06-23 SDOH — HEALTH STABILITY: MENTAL HEALTH
HOW OFTEN DO YOU NEED TO HAVE SOMEONE HELP YOU WHEN YOU READ INSTRUCTIONS, PAMPHLETS, OR OTHER WRITTEN MATERIAL FROM YOUR DOCTOR OR PHARMACY?: RARELY

## 2024-06-23 SDOH — ECONOMIC STABILITY: FOOD INSECURITY: WITHIN THE PAST 12 MONTHS, THE FOOD YOU BOUGHT JUST DIDN'T LAST AND YOU DIDN'T HAVE MONEY TO GET MORE.: NEVER TRUE

## 2024-06-23 SDOH — HEALTH STABILITY: MENTAL HEALTH
STRESS IS WHEN SOMEONE FEELS TENSE, NERVOUS, ANXIOUS, OR CAN'T SLEEP AT NIGHT BECAUSE THEIR MIND IS TROUBLED. HOW STRESSED ARE YOU?: ONLY A LITTLE

## 2024-06-23 SDOH — ECONOMIC STABILITY: INCOME INSECURITY: IN THE LAST 12 MONTHS, WAS THERE A TIME WHEN YOU WERE NOT ABLE TO PAY THE MORTGAGE OR RENT ON TIME?: NO

## 2024-06-23 SDOH — ECONOMIC STABILITY: FOOD INSECURITY: WITHIN THE PAST 12 MONTHS, YOU WORRIED THAT YOUR FOOD WOULD RUN OUT BEFORE YOU GOT MONEY TO BUY MORE.: NEVER TRUE

## 2024-06-23 SDOH — SOCIAL STABILITY: SOCIAL NETWORK: HOW OFTEN DO YOU GET TOGETHER WITH FRIENDS OR RELATIVES?: MORE THAN THREE TIMES A WEEK

## 2024-06-23 SDOH — ECONOMIC STABILITY: INCOME INSECURITY: IN THE PAST 12 MONTHS, HAS THE ELECTRIC, GAS, OIL, OR WATER COMPANY THREATENED TO SHUT OFF SERVICE IN YOUR HOME?: NO

## 2024-06-23 SDOH — SOCIAL STABILITY: SOCIAL INSECURITY: WITHIN THE LAST YEAR, HAVE YOU BEEN HUMILIATED OR EMOTIONALLY ABUSED IN OTHER WAYS BY YOUR PARTNER OR EX-PARTNER?: NO

## 2024-06-23 SDOH — SOCIAL STABILITY: SOCIAL NETWORK: HOW OFTEN DO YOU ATTEND CHURCH OR RELIGIOUS SERVICES?: NEVER

## 2024-06-23 SDOH — HEALTH STABILITY: MENTAL HEALTH: HOW OFTEN DO YOU HAVE 6 OR MORE DRINKS ON ONE OCCASION?: NEVER

## 2024-06-23 SDOH — ECONOMIC STABILITY: INCOME INSECURITY: HOW HARD IS IT FOR YOU TO PAY FOR THE VERY BASICS LIKE FOOD, HOUSING, MEDICAL CARE, AND HEATING?: NOT HARD AT ALL

## 2024-06-23 SDOH — HEALTH STABILITY: PHYSICAL HEALTH: ON AVERAGE, HOW MANY DAYS PER WEEK DO YOU ENGAGE IN MODERATE TO STRENUOUS EXERCISE (LIKE A BRISK WALK)?: 7 DAYS

## 2024-06-23 SDOH — SOCIAL STABILITY: SOCIAL NETWORK: HOW OFTEN DO YOU ATTENT MEETINGS OF THE CLUB OR ORGANIZATION YOU BELONG TO?: NEVER

## 2024-06-23 SDOH — HEALTH STABILITY: PHYSICAL HEALTH: ON AVERAGE, HOW MANY MINUTES DO YOU ENGAGE IN EXERCISE AT THIS LEVEL?: 30 MIN

## 2024-06-23 SDOH — HEALTH STABILITY: MENTAL HEALTH: HOW MANY STANDARD DRINKS CONTAINING ALCOHOL DO YOU HAVE ON A TYPICAL DAY?: PATIENT DOES NOT DRINK

## 2024-06-23 ASSESSMENT — PAIN SCALES - GENERAL
PAINLEVEL_OUTOF10: 0 - NO PAIN

## 2024-06-23 ASSESSMENT — COGNITIVE AND FUNCTIONAL STATUS - GENERAL
MOVING TO AND FROM BED TO CHAIR: A LITTLE
MOBILITY SCORE: 19
TURNING FROM BACK TO SIDE WHILE IN FLAT BAD: A LITTLE
STANDING UP FROM CHAIR USING ARMS: A LITTLE
WALKING IN HOSPITAL ROOM: A LITTLE
CLIMB 3 TO 5 STEPS WITH RAILING: A LITTLE

## 2024-06-23 ASSESSMENT — PAIN - FUNCTIONAL ASSESSMENT
PAIN_FUNCTIONAL_ASSESSMENT: 0-10

## 2024-06-23 ASSESSMENT — LIFESTYLE VARIABLES
AUDIT-C TOTAL SCORE: 0
SKIP TO QUESTIONS 9-10: 1

## 2024-06-23 NOTE — PROGRESS NOTES
06/23/24 1344   [facility] Disability Status   Are you deaf or do you have serious difficulty hearing? N   Are you blind or do you have serious difficulty seeing, even when wearing glasses? N   Because of a physical, mental, or emotional condition, do you have serious difficulty concentrating, remembering, or making decisions? (5 years old or older) N   Do you have serious difficulty walking or climbing stairs? N   Do you have serious difficulty dressing or bathing? N   Because of a physical, mental, or emotional condition, do you have serious difficulty doing errands alone such as visiting the doctor? N

## 2024-06-23 NOTE — DISCHARGE SUMMARY
"Discharge Diagnosis  Carotid stenosis, bilateral    Issues Requiring Follow-Up  Surgical incision follow-up  Follow-up carotid duplex in 1 month    Test Results Pending At Discharge  Pending Labs       Order Current Status    Surgical Pathology Exam In process            Hospital Course   Pleasant 79-year-old  male who was admitted to the hospital on 6/21/2024 for an elective carotid endarterectomy done by Dr. Aung Botello.  The patient was admitted to the ICU postoperatively and developed right-sided head pressure and a feeling as if his eye was going to be \"sucked in\" soon after surgery.  A stat head CT was completed which was negative and the patient was given a course of IV Decadron per Dr. Botello.  He also had some issues with hypotension postoperatively.  He was initiated on a Cardene drip which was discontinued on postoperative day 1.  His home medications were resumed but on postoperative day 2 he was noted to have systolic blood pressure still in the 150s.  I discussed with Dr. Botello and increased his metoprolol to 50 mg daily as opposed to 25.  Patient has a follow-up with his primary care next Saturday and I advised him to keep that appointment.  He was also advised to check his blood pressures at home on a daily basis so that his primary care has a record.  PT/OT consultation was obtained.  Patient tolerating a normal diet.  He did develop a hematoma of the right neck postoperatively which had been monitored and is soft and not taut.  It is not expanding in size.  Appropriate postop discharge instructions were given and the patient will follow-up in 2 weeks with our practice for further evaluation.    Pertinent Physical Exam At Time of Discharge  Physical Exam    General: Pt is alert and oriented x 3. Pleasant, conversive  HEENT: Head is atraumatic, normocephalic.  Right neck surgical incision well-approximated.  Moderate size hematoma that is soft and not taut.  There is some ecchymosis extending " lore-incisional he and down into the upper chest.  Cardiac: Normal S1-S2.  Regular rate and rhythm.  No murmurs.  Respiratory: Lungs clear to auscultation.  No adventitious sounds.  Abdomen: Soft, nondistended, nontender.  Bowel sounds x4 quadrants.  Pulse exam: Palpable brachial and radial pulses bilaterall.   femoral, popliteal, pedal pulses are palpable bilaterally.  Extremities: No significant edema noted.  Extremities are warm to the touch and normal in color.  No open wounds or sores.  Neuro: Moves all extremities spontaneously.  No focal deficits.   strength is 5+ bilaterally.  Dorsiflexion and plantarflexion are 5+ bilaterally.  Smile is symmetrical.  Tongue is midline.  Psych: Appropriate affect.  Answers questions appropriately.    Home Medications     Medication List      CHANGE how you take these medications     metoprolol succinate XL 25 mg 24 hr tablet; Commonly known as:   Toprol-XL; Take 2 tablets (50 mg) by mouth once daily. DO NOT CRUSH OR   CHEW; What changed: how much to take     CONTINUE taking these medications     alfuzosin 10 mg 24 hr tablet; Commonly known as: Uroxatral; TAKE 1   TABLET BY MOUTH EVERY DAY   amLODIPine 10 mg tablet; Commonly known as: Norvasc; TAKE 1 TABLET BY   MOUTH EVERY DAY   ammonium lactate 12 % lotion; Commonly known as: Lac-Hydrin   aspirin 325 mg tablet   bacitracin 500 unit/gram ointment; Apply topically 2 times a day.   cephalexin 500 mg capsule; Commonly known as: Keflex; Take 1 capsule   (500 mg) by mouth 3 times a day.   cholecalciferol 5,000 Units tablet; Commonly known as: Vitamin D-3; TAKE   1 TABLET BY MOUTH EVERY DAY   clopidogrel 75 mg tablet; Commonly known as: Plavix; TAKE 1 TABLET BY   MOUTH EVERY DAY   cyanocobalamin (vitamin B-12) 1,000 mcg tablet extended release;   Commonly known as: Vitamin B-12   econazole nitrate 1 % cream; APPLY TO AFFECTED AREA EVERY DAY AS   DIRECTED *30 DAY SUPPLY*   famotidine 20 mg tablet; Commonly known as: Pepcid;  TAKE 1 TABLET BY   MOUTH TWICE A DAY   finasteride 5 mg tablet; Commonly known as: Proscar; TAKE 1 TABLET BY   MOUTH EVERY DAY   fluticasone 50 mcg/actuation nasal spray; Commonly known as: Flonase;   USE 1 SPRAY EVERY DAY IN EACH NOSTRIL *LASTS 60 DAYS*   pantoprazole 40 mg EC tablet; Commonly known as: ProtoNix; TAKE 1 TABLET   BY MOUTH ONCE DAILY IN THE MORNING BEFORE A MEAL. DO NOT CRUSH, CHEW OR   SPLIT   triamcinolone 0.1 % cream; Commonly known as: Kenalog; APPLY TO AFFECTED   AREA TWICE A DAY     STOP taking these medications     chlorhexidine 0.12 % solution; Commonly known as: Peridex     ASK your doctor about these medications     rosuvastatin 40 mg tablet; Commonly known as: Crestor; Take 1 tablet (40   mg) by mouth once daily.   Serevent Diskus 50 mcg/dose diskus inhaler; Generic drug: salmeterol;   Inhale 1 puff 2 times a day.       Outpatient Follow-Up  Future Appointments   Date Time Provider Department Center   7/5/2024  8:45 AM Mio Pryor MD [id number] [facility]   10/11/2024  9:30 AM MENT ECHO/STRESS [id number] [facility] [place] [facility]   10/11/2024 10:30 AM SAVANNAH Weston [id number] [facility]       SAVANNAH Bray

## 2024-06-23 NOTE — PROGRESS NOTES
Physical Therapy    Physical Therapy Treatment    Patient Name: Charles Chan  MRN: [id number]  Today's Date: 6/23/2024  Time Calculation  Start Time: 1103  Stop Time: 1126  Time Calculation (min): 23 min    Assessment/Plan   PT Assessment  PT Assessment Results: Decreased endurance, Impaired balance, Decreased mobility, Impaired judgement, Decreased safety awareness, Pain  Rehab Prognosis: Good  Evaluation/Treatment Tolerance: Patient tolerated treatment well  Medical Staff Made Aware: Yes  Strengths: Ability to acquire knowledge  Barriers to Participation: Comorbidities  End of Session Communication: Bedside nurse    Assessment Comment: The patient is progressing well towards functional goals during hospital stay; pt requires supervision for ambulation with no AD; overall, good balance with no major LOB noted. Pt would continue to benefit from skilled therapy services to address residual functional deficits.    End of Session Patient Position: Bed, 3 rail up, Alarm off, not on at start of session  PT Plan  Inpatient/Swing Bed or Outpatient: Inpatient  PT Plan  Treatment/Interventions: Bed mobility, Transfer training, Gait training, Stair training, Balance training, Neuromuscular re-education, Endurance training, Therapeutic exercise, Therapeutic activity, Range of motion, Strengthening, Postural re-education, Home exercise program (avoid strength training using resistance at this time d/t post-op precautions)  PT Plan: Ongoing PT  PT Frequency: 4 times per week    PT Discharge Recommendations: Low intensity level of continued care    Equipment Recommended upon Discharge: Wheeled walker  PT Recommended Transfer Status: Assist x1  PT - OK to Discharge: Yes      General Visit Information:   PT  Visit  PT Received On: 06/23/24  Response to Previous Treatment: Patient with no complaints from previous session.  General  Family/Caregiver Present: Yes  Caregiver Feedback: spouse present  Prior to Session Communication:  Bedside nurse  Patient Position Received: Bed, 3 rail up, Alarm off, not on at start of session  Preferred Learning Style: auditory  General Comment: The patient was cleared for therapy follow-up by nsg. Pt presented supine in bed and agreeable to session.    Subjective   Precautions:  Precautions  Medical Precautions: Fall precautions  Precautions Comment: s/p Rt CEA  Vital Signs:  Vital Signs  Heart Rate: 87  Heart Rate Source: Monitor  Resp: 16  SpO2: 97 % (on room air)  BP: 156/77  MAP (mmHg): 95  BP Location: Left arm  BP Method: Automatic  Patient Position: Lying    Objective   Pain:  Pain Assessment  Pain Assessment: 0-10  0-10 (Numeric) Pain Score: 0 - No pain  Cognition:  Cognition  Overall Cognitive Status: Within Functional Limits  Orientation Level: Oriented X4  Coordination:  Coordination Comment: WFL BLE  Postural Control:  Postural Control  Posture Comment: mild forward head posture  Static Sitting Balance  Static Sitting-Balance Support: Feet supported  Static Sitting-Level of Assistance: Distant supervision  Static Sitting-Comment/Number of Minutes: EOB > 5 min  Static Standing Balance  Static Standing-Balance Support: No upper extremity supported  Static Standing-Level of Assistance: Distant supervision  Static Standing-Comment/Number of Minutes: 1 min with mild narrow SADAF  Dynamic Standing Balance  Dynamic Standing-Balance Support: No upper extremity supported  Dynamic Standing-Comments: Close supervision for ambulation with no AD  Extremity/Trunk Assessments:  RLE   RLE : Within Functional Limits  LLE   LLE : Within Functional Limits  Activity Tolerance:  Activity Tolerance  Endurance: Tolerates 10 - 20 min exercise with multiple rests  Rate of Perceived Exertion (RPE): 5/10  Treatments:  Therapeutic Activity  Therapeutic Activity Performed: Yes  Therapeutic Activity 1: Education provided on importance of mobility during hospital stay to assist with maintaining endurance and strength. Pt  encouraged to eat all meals in bedside chair and to ambulated >4x a day with assist as needed.    Bed Mobility  Bed Mobility: Yes  Bed Mobility 1  Bed Mobility 1: Supine to sitting, Sitting to supine  Level of Assistance 1: Distant supervision  Bed Mobility Comments 1: HOB to 25 deg; use of bed rails    Ambulation/Gait Training  Ambulation/Gait Training Performed: Yes  Ambulation/Gait Training 1  Surface 1: Level tile  Device 1: No device  Assistance 1: Close supervision  Quality of Gait 1: Narrow base of support  Comments/Distance (ft) 1: 60ftx2 with no AD; overall, steady gait with minimal rigoberto toe clearance and narrow SADAF. Intermittent assist for general safety awareness in hallway.  Transfers  Transfer: Yes  Transfer 1  Transfer From 1: Bed to, Stand to  Transfer to 1: Stand, Bed  Technique 1: Sit to stand, Stand to sit  Transfer Level of Assistance 1: Close supervision    Outcome Measures:  [facility] Basic Mobility  Turning from your back to your side while in a flat bed without using bedrails: None  Moving from lying on your back to sitting on the side of a flat bed without using bedrails: A little  Moving to and from bed to chair (including a wheelchair): A little  Standing up from a chair using your arms (e.g. wheelchair or bedside chair): A little  To walk in hospital room: A little  Climbing 3-5 steps with railing: A little  Basic Mobility - Total Score: 19      Encounter Problems       Encounter Problems (Active)       PT Problem       Patient will ambulate 200' distance using no AD with independent (Progressing)       Start:  06/22/24    Expected End:  07/06/24         Goal Note       Patient will ambulate 200ft distance using no device with independent              Patient will ascend and descend 1 flight of stairs with rail mod independent (Progressing)       Start:  06/22/24    Expected End:  07/06/24         Goal Note       Patient will ascend and descend 1 flight of stairs with rail modified  independent                 Pain - Adult

## 2024-06-23 NOTE — PROGRESS NOTES
Overall feeling OK   Breathing stable on RA, lying flat   Constipated and asking for prune juice   UOP 1.925 mL yesterday w/ IV bumex             Nephrology consult hx 3/8/24  Óscar is a 87 year old male  Hx of HTN, HLD, CAD, SSS post PPM, Afib on a/c, AS post TAVR 1/23/24 & BPH  CKD 3b with creat 1.3-1.6 of late & anemia felt a/w dec EPO a/w CKD followed by Heme  Admitted for intervention of pseudoaneurysm of LLE post aortogram & repair 3/6    Received home dose of spironolactone 12.5 mg & valsartan 160 mg on 3/6  Post oral furosemide 20 mg daily 3/6 & 3/7 am   Post operatively yesterday noted decline in Hgb  Receiving total of 3 units PRBC  Post albumin 25% 12.5 gm x 2  Post  ml bolus x 2 then 100 ml/hr until 3/7 am  Started overnight on norepinephrine to keep MAP > 65  Above weaned down slightly to 6 mcg/min     Creat since admission: 1.48 -> 1.30 -> 1.64 -> 2.17 -> 2.55 -> 2.63 -> 2.71 this am   Consult requested & urine studies sent overnight  FEna 0.68%    Seen this am with SICU nursing bedside  Awake, alert & oriented x 4  Having left groin & abdominal discomfort (brought him to tears overnight)  Lying flat in bed on RA, saturating well & not sob  /43 at present  Gomez in place with 10-15 ml/hr  Previous bladder scan & gomez flushing w/o findings  Renal u/s without acute findings or obstruction     Visit Vitals  /66 (BP Location: LUE - Left upper extremity, Patient Position: Supine)   Pulse 75   Temp 96.8 °F (36 °C) (Tympanic)   Resp 16   Ht 5' 9\" (1.753 m)   Wt 76.8 kg (169 lb 5 oz)   SpO2 96%   BMI 25.00 kg/m²     Physical Exam  Constitutional:       General: He is awake. He is not in acute distress.     Appearance: Normal appearance. He is not toxic-appearing.   HENT:      Head: Atraumatic.      Mouth/Throat:      Mouth: Mucous membranes are moist.   Eyes:      Extraocular Movements: Extraocular movements intact.   Cardiovascular:      Rate and Rhythm: Normal rate. Rhythm irregular.  Charles Chan is an 79 year old male post op endarterectomy.        06/23/24 1344   Current Planned Discharge Disposition   Current Planned Discharge Disposition Home     He lives with his wife, four great grandchildren and their parents. Bedroom and bathroom on main level. At baseline, no use of assistive devices, he will discharge with a walker. He is independent with ADLs, daily tasks, drives, and is active. PCP is Dr GERONIMO Pryor.   ADOD today  [facility] 19  Plan is to discharge home with no skilled needs, wife will transport.    Drea Winter RN-BSN     Pulmonary:      Effort: Pulmonary effort is normal. No respiratory distress.      Breath sounds: No wheezing or rales.      Comments: Decrease bases  Abdominal:      Palpations: Abdomen is soft.      Tenderness: There is no abdominal tenderness.   Musculoskeletal:         General: Swelling (left groin) present.      Cervical back: Neck supple.      Right lower leg: Edema present.      Left lower leg: Edema present.   Skin:     General: Skin is warm.   Neurological:      Mental Status: He is alert and oriented to person, place, and time. Mental status is at baseline.   Psychiatric:         Behavior: Behavior is cooperative.       Recent Labs   Lab 03/13/24  0430 03/12/24  0427 03/11/24  0450 03/10/24  0404 03/09/24  1547 03/09/24  0439 03/08/24  1519 03/08/24  0330 03/07/24  0554 03/06/24  2321 03/06/24  1604   SODIUM 131* 131* 131* 131*   < > 131*   < > 135   < > 141 142   POTASSIUM 3.8 4.0 4.4 4.9   < > 4.9   < > 4.8   < > 5.4* 4.7   CHLORIDE 102 103 103 103   < > 104   < > 105   < > 113* 111*   CO2 23 22 22 23   < > 21   < > 23   < > 22 24   BUN 67* 68* 66* 68*   < > 64*   < > 60*   < > 38* 45*   CREATININE 2.52* 2.91* 2.94* 3.40*   < > 3.53*   < > 2.71*   < > 1.30* 1.48*   CALCIUM 8.0* 8.1* 8.1* 7.8*   < > 8.2*   < > 8.2*   < > 7.5* 8.6   MG  --   --   --  2.1  --  2.2  --  2.1   < >  --   --    PHOS  --   --   --  3.3  --  4.4  --  4.0   < >  --   --    ALBUMIN  --   --   --   --   --   --   --   --   --  2.3* 3.0*   BILIRUBIN  --   --   --   --   --   --   --   --   --  0.6 1.0   ALKPT  --   --   --   --   --   --   --   --   --  48 63   GPT  --   --   --   --   --   --   --   --   --  12 18   AST  --   --   --   --   --   --   --   --   --  19 25   GLUCOSE 106* 114* 102* 110*   < > 96   < > 126*   < > 124* 126*    < > = values in this interval not displayed.       Recent Labs   Lab 03/13/24  0430 03/12/24  0427 03/11/24  0450   WBC 8.3 8.1 9.2   HGB 8.5* 8.6* 8.5*   HCT 25.4* 26.0* 25.8*   * 111* 97*        Impression  -Aortogram, pelvic angiogram, left common fem & ext iliac artery pseudoaneurysm repair 3/6  -Post-op left groin hematoma & anemia requiring transfusions  -Shock requiring iv pressors but weaned off   -Pre-renal to ATN ANGEL a/w above + contrast starting slowly improving  -Fluid overload w/ ATN/aggressive IVF. Responding to diuretics   -CKD 3b with recent creat 1.3-1.6 followed by PMD  -Chronic anemia felt a/w above on intermittent LIZ (Aranesp) with Heme  -Chronic thrombocytopenia followed by Heme  -Mild hypoNa a/w hypervolemia & lower GFR    Plan  -Redose iv bumex 2 mg now   -Continue holding valsartan, spironolactone & isosorbide  -Keep on oral FR 1.5 liters daily but remove renal restrictions otherwise   -Okay to keep [Na] > 130   -Strict I/Os and daily weights and labs     Mono Dill MD   [facility] [place]  Contact via [employer]  Office/Answering service [phone]

## 2024-06-23 NOTE — CARE PLAN
"  Problem: Skin  Goal: Decreased wound size/increased tissue granulation at next dressing change  Outcome: Progressing  Goal: Participates in plan/prevention/treatment measures  Outcome: Progressing  Goal: Prevent/manage excess moisture  Outcome: Progressing  Goal: Prevent/minimize sheer/friction injuries  Outcome: Progressing  Goal: Promote/optimize nutrition  Outcome: Progressing  Goal: Promote skin healing  Outcome: Progressing     Problem: Pain  Goal: Takes deep breaths with improved pain control throughout the shift  Outcome: Progressing  Goal: Turns in bed with improved pain control throughout the shift  Outcome: Progressing  Goal: Walks with improved pain control throughout the shift  Outcome: Progressing  Goal: Performs ADL's with improved pain control throughout shift  Outcome: Progressing  Goal: Participates in PT with improved pain control throughout the shift  Outcome: Progressing  Goal: Free from opioid side effects throughout the shift  Outcome: Progressing  Goal: Free from acute confusion related to pain meds throughout the shift  Outcome: Progressing     Problem: Pain - Adult  Goal: Verbalizes/displays adequate comfort level or baseline comfort level  Outcome: Progressing     Problem: Safety - Adult  Goal: Free from fall injury  Outcome: Progressing     Problem: Discharge Planning  Goal: Discharge to home or other facility with appropriate resources  Outcome: Progressing     Problem: Chronic Conditions and Co-morbidities  Goal: Patient's chronic conditions and co-morbidity symptoms are monitored and maintained or improved  Outcome: Progressing           The patient's goals for the shift include  \"go home\"    The clinical goals for the shift include remain hd stable, free of pain      "

## 2024-06-23 NOTE — PROGRESS NOTES
06/23/24 1341   Discharge Planning   Living Arrangements Spouse/significant other;Family members   Support Systems Spouse/significant other;Family members   Assistance Needed none   Type of Residence Private residence   Number of Stairs to Enter Residence 4   Number of Stairs Within Residence 0   Do you have animals or pets at home? No   Who is requesting discharge planning? Provider   Home or Post Acute Services None   Patient expects to be discharged to: [facility]   Does the patient need discharge transport arranged? No   Patient Choice   Provider Choice list and CMS website (https://medicare.gov/care-compare#search) for post-acute Quality and Resource Measure Data were provided and reviewed with: Other (Comment)  (no skilled needs)   Patient / Family choosing to utilize agency / facility established prior to hospitalization No

## 2024-06-23 NOTE — PROGRESS NOTES
06/23/24 1334   Physical Activity   On average, how many days per week do you engage in moderate to strenuous exercise (like a brisk walk)? 7 days   On average, how many minutes do you engage in exercise at this level? 30 min   Financial Resource Strain   How hard is it for you to pay for the very basics like food, housing, medical care, and heating? Not hard   Housing Stability   In the last 12 months, was there a time when you were not able to pay the mortgage or rent on time? N   In the last 12 months, how many places have you lived? 1   In the last 12 months, was there a time when you did not have a steady place to sleep or slept in a shelter (including now)? N   Transportation Needs   In the past 12 months, has lack of transportation kept you from meetings, work, or from getting things needed for daily living? No   Food Insecurity   Within the past 12 months, you worried that your food would run out before you got the money to buy more. Never true   Within the past 12 months, the food you bought just didn't last and you didn't have money to get more. Never true   Stress   Do you feel stress - tense, restless, nervous, or anxious, or unable to sleep at night because your mind is troubled all the time - these days? Only a littl   Social Connections   In a typical week, how many times do you talk on the phone with family, friends, or neighbors? More than 3   How often do you get together with friends or relatives? More than 3   How often do you attend Congregational or Catholic services? Never   Do you belong to any clubs or organizations such as Congregational groups, unions, fraternal or athletic groups, or school groups? No   How often do you attend meetings of the clubs or organizations you belong to? Never   Are you , , , , never , or living with a partner?    Intimate Partner Violence   Within the last year, have you been afraid of your partner or ex-partner? No   Within the  last year, have you been humiliated or emotionally abused in other ways by your partner or ex-partner? No   Within the last year, have you been kicked, hit, slapped, or otherwise physically hurt by your partner or ex-partner? No   Within the last year, have you been raped or forced to have any kind of sexual activity by your partner or ex-partner? No   Alcohol Use   Q1: How often do you have a drink containing alcohol? Never   Q2: How many drinks containing alcohol do you have on a typical day when you are drinking? None   Q3: How often do you have six or more drinks on one occasion? Never   Utilities   In the past 12 months has the electric, gas, oil, or water company threatened to shut off services in your home? No   Health Literacy   How often do you need to have someone help you when you read instructions, pamphlets, or other written material from your doctor or pharmacy? Rarely

## 2024-06-25 LAB
LABORATORY COMMENT REPORT: NORMAL
PATH REPORT.FINAL DX SPEC: NORMAL
PATH REPORT.GROSS SPEC: NORMAL
PATH REPORT.RELEVANT HX SPEC: NORMAL
PATH REPORT.TOTAL CANCER: NORMAL

## 2024-06-26 ENCOUNTER — PHARMACY VISIT (OUTPATIENT)
Dept: PHARMACY | Facility: CLINIC | Age: 79
End: 2024-06-26
Payer: COMMERCIAL

## 2024-06-26 PROCEDURE — RXMED WILLOW AMBULATORY MEDICATION CHARGE

## 2024-07-02 ENCOUNTER — APPOINTMENT (OUTPATIENT)
Dept: PRIMARY CARE | Facility: CLINIC | Age: 79
End: 2024-07-02
Payer: MEDICARE

## 2024-07-05 ENCOUNTER — LAB (OUTPATIENT)
Dept: LAB | Facility: LAB | Age: 79
End: 2024-07-05
Payer: MEDICARE

## 2024-07-05 ENCOUNTER — APPOINTMENT (OUTPATIENT)
Dept: PRIMARY CARE | Facility: CLINIC | Age: 79
End: 2024-07-05
Payer: MEDICARE

## 2024-07-05 VITALS
SYSTOLIC BLOOD PRESSURE: 132 MMHG | BODY MASS INDEX: 32.91 KG/M2 | DIASTOLIC BLOOD PRESSURE: 68 MMHG | HEIGHT: 72 IN | WEIGHT: 243 LBS

## 2024-07-05 DIAGNOSIS — I10 PRIMARY HYPERTENSION: ICD-10-CM

## 2024-07-05 DIAGNOSIS — E78.00 HYPERCHOLESTEROLEMIA: ICD-10-CM

## 2024-07-05 DIAGNOSIS — N18.9 CHRONIC RENAL FAILURE, UNSPECIFIED CKD STAGE: Primary | ICD-10-CM

## 2024-07-05 DIAGNOSIS — R00.1 BRADYCARDIA: ICD-10-CM

## 2024-07-05 DIAGNOSIS — L03.90 CELLULITIS, UNSPECIFIED CELLULITIS SITE: ICD-10-CM

## 2024-07-05 DIAGNOSIS — T78.40XA ALLERGY, INITIAL ENCOUNTER: ICD-10-CM

## 2024-07-05 DIAGNOSIS — D64.9 ANEMIA, UNSPECIFIED TYPE: ICD-10-CM

## 2024-07-05 DIAGNOSIS — I87.2 VENOUS STASIS DERMATITIS OF BOTH LOWER EXTREMITIES: ICD-10-CM

## 2024-07-05 DIAGNOSIS — I87.2 VENOUS INSUFFICIENCY OF BOTH LOWER EXTREMITIES: ICD-10-CM

## 2024-07-05 LAB
ALBUMIN SERPL BCP-MCNC: 3.6 G/DL (ref 3.4–5)
ALP SERPL-CCNC: 96 U/L (ref 33–136)
ALT SERPL W P-5'-P-CCNC: 9 U/L (ref 10–52)
ANION GAP SERPL CALC-SCNC: 13 MMOL/L (ref 10–20)
AST SERPL W P-5'-P-CCNC: 12 U/L (ref 9–39)
BILIRUB SERPL-MCNC: 0.6 MG/DL (ref 0–1.2)
BUN SERPL-MCNC: 35 MG/DL (ref 6–23)
CALCIUM SERPL-MCNC: 8.6 MG/DL (ref 8.6–10.6)
CHLORIDE SERPL-SCNC: 108 MMOL/L (ref 98–107)
CO2 SERPL-SCNC: 24 MMOL/L (ref 21–32)
CREAT SERPL-MCNC: 2.11 MG/DL (ref 0.5–1.3)
EGFRCR SERPLBLD CKD-EPI 2021: 31 ML/MIN/1.73M*2
ERYTHROCYTE [DISTWIDTH] IN BLOOD BY AUTOMATED COUNT: 12.2 % (ref 11.5–14.5)
GLUCOSE SERPL-MCNC: 96 MG/DL (ref 74–99)
HCT VFR BLD AUTO: 36.9 % (ref 41–52)
HGB BLD-MCNC: 12 G/DL (ref 13.5–17.5)
MAGNESIUM SERPL-MCNC: 2.29 MG/DL (ref 1.6–2.4)
MCH RBC QN AUTO: 29 PG (ref 26–34)
MCHC RBC AUTO-ENTMCNC: 32.5 G/DL (ref 32–36)
MCV RBC AUTO: 89 FL (ref 80–100)
NRBC BLD-RTO: 0 /100 WBCS (ref 0–0)
PLATELET # BLD AUTO: 228 X10*3/UL (ref 150–450)
POTASSIUM SERPL-SCNC: 4.9 MMOL/L (ref 3.5–5.3)
PROT SERPL-MCNC: 6.5 G/DL (ref 6.4–8.2)
RBC # BLD AUTO: 4.14 X10*6/UL (ref 4.5–5.9)
SODIUM SERPL-SCNC: 140 MMOL/L (ref 136–145)
WBC # BLD AUTO: 11 X10*3/UL (ref 4.4–11.3)

## 2024-07-05 PROCEDURE — 1111F DSCHRG MED/CURRENT MED MERGE: CPT | Performed by: INTERNAL MEDICINE

## 2024-07-05 PROCEDURE — 83735 ASSAY OF MAGNESIUM: CPT

## 2024-07-05 PROCEDURE — 99214 OFFICE O/P EST MOD 30 MIN: CPT | Performed by: INTERNAL MEDICINE

## 2024-07-05 PROCEDURE — 3075F SYST BP GE 130 - 139MM HG: CPT | Performed by: INTERNAL MEDICINE

## 2024-07-05 PROCEDURE — 80053 COMPREHEN METABOLIC PANEL: CPT

## 2024-07-05 PROCEDURE — 3078F DIAST BP <80 MM HG: CPT | Performed by: INTERNAL MEDICINE

## 2024-07-05 PROCEDURE — 1159F MED LIST DOCD IN RCRD: CPT | Performed by: INTERNAL MEDICINE

## 2024-07-05 PROCEDURE — 36415 COLL VENOUS BLD VENIPUNCTURE: CPT

## 2024-07-05 PROCEDURE — 85027 COMPLETE CBC AUTOMATED: CPT

## 2024-07-05 RX ORDER — TRIAMCINOLONE ACETONIDE 1 MG/G
CREAM TOPICAL 2 TIMES DAILY
Qty: 80 G | Refills: 0 | Status: SHIPPED | OUTPATIENT
Start: 2024-07-05

## 2024-07-05 RX ORDER — CLONIDINE HYDROCHLORIDE 0.1 MG/1
0.1 TABLET ORAL 3 TIMES DAILY
Qty: 90 TABLET | Refills: 0 | Status: SHIPPED | OUTPATIENT
Start: 2024-07-05

## 2024-07-05 RX ORDER — CEPHALEXIN 500 MG/1
500 CAPSULE ORAL 3 TIMES DAILY
Qty: 45 CAPSULE | Refills: 0 | Status: SHIPPED | OUTPATIENT
Start: 2024-07-05

## 2024-07-05 RX ORDER — METOPROLOL SUCCINATE 25 MG/1
25 TABLET, EXTENDED RELEASE ORAL 2 TIMES DAILY
Qty: 180 TABLET | Refills: 0 | Status: SHIPPED | OUTPATIENT
Start: 2024-07-05

## 2024-07-05 ASSESSMENT — ENCOUNTER SYMPTOMS
LOSS OF SENSATION IN FEET: 0
OCCASIONAL FEELINGS OF UNSTEADINESS: 0
DEPRESSION: 0

## 2024-07-05 NOTE — PROGRESS NOTES
Subjective   Patient ID: Charles Chan is a 79 y.o. male who presents for Follow-up.    This gentleman today came here for follow-up on various conditions.  He had a carotid surgery done on 06/21/2024.  He was doing okay, where his blood pressure went really up.  The patient uses common sense and made amlodipine twice a day and it did help.  He is already taking metoprolol twice a day.  Appetite and weight are okay.  No problem.    I have personally reviewed the patient's Past Medical History, Medications, Allergies, Social History, and Family History in the EMR.    Review of Systems   All other systems reviewed and are negative.    Objective   /68   Ht 1.829 m (6')   Wt 110 kg (243 lb)   BMI 32.96 kg/m²     Physical Exam  Vitals reviewed.   Neck:      Comments: Right carotid area healing well.  No signs of infection.  ______ cellulitis.  Cardiovascular:      Heart sounds: Normal heart sounds, S1 normal and S2 normal. No murmur heard.     No friction rub.   Pulmonary:      Effort: Pulmonary effort is normal.      Breath sounds: Normal breath sounds and air entry.   Abdominal:      Palpations: There is no hepatomegaly, splenomegaly or mass.   Musculoskeletal:      Right lower leg: No edema.      Left lower leg: No edema.   Lymphadenopathy:      Lower Body: No right inguinal adenopathy. No left inguinal adenopathy.   Neurological:      Cranial Nerves: Cranial nerves 2-12 are intact.      Sensory: No sensory deficit.      Motor: Motor function is intact.      Deep Tendon Reflexes: Reflexes are normal and symmetric.     LAB WORK:  Laboratory testing discussed.    Assessment/Plan   Problem List Items Addressed This Visit             ICD-10-CM       Cardiac and Vasculature    Hypertension I10    Relevant Medications    metoprolol succinate XL (Toprol-XL) 25 mg 24 hr tablet    cloNIDine (Catapres) 0.1 mg tablet    Other Relevant Orders    CBC (Completed)    Magnesium (Completed)    Hypercholesterolemia E78.00     Relevant Orders    Comprehensive Metabolic Panel    Venous insufficiency of both lower extremities I87.2    Relevant Medications    triamcinolone (Kenalog) 0.1 % cream    cephalexin (Keflex) 500 mg capsule       Hematology and Neoplasia    Anemia D64.9       Infectious Diseases    Cellulitis L03.90     Other Visit Diagnoses         Codes    Chronic renal failure, unspecified CKD stage    -  Primary N18.9    Bradycardia     R00.1    Relevant Medications    metoprolol succinate XL (Toprol-XL) 25 mg 24 hr tablet    Allergy, initial encounter     T78.40XA        1. Hypertension, pretty high.  Metoprolol succinate 25 mg twice a day, amlodipine 10 mg.  I added clonidine 0.1 mg up to three times a day.  The patient and wife are intelligent.  I told him to take it if after 140 and above, top number up to three times a day.  He understood.  2. Chronic renal failure.  I will check kidney function right away.  3. Anemia.  Monitor.  4. Cellulitis. Keflex given.  5. Allergies.  Triamcinolone given.  6. I shall see him back in a week.  7. Continue to follow.    Gin Attestation  By signing my name below, ILeonie Scribe attest that this documentation has been prepared under the direction and in the presence of Moi Pryor MD.

## 2024-07-08 ENCOUNTER — OFFICE VISIT (OUTPATIENT)
Dept: VASCULAR SURGERY | Facility: CLINIC | Age: 79
End: 2024-07-08
Payer: MEDICARE

## 2024-07-08 VITALS
WEIGHT: 204.5 LBS | DIASTOLIC BLOOD PRESSURE: 66 MMHG | SYSTOLIC BLOOD PRESSURE: 129 MMHG | BODY MASS INDEX: 27.7 KG/M2 | HEIGHT: 72 IN | RESPIRATION RATE: 18 BRPM | HEART RATE: 53 BPM

## 2024-07-08 DIAGNOSIS — E78.00 PURE HYPERCHOLESTEROLEMIA: ICD-10-CM

## 2024-07-08 DIAGNOSIS — Z98.890 H/O ENDARTERECTOMY: ICD-10-CM

## 2024-07-08 DIAGNOSIS — I65.23 CAROTID STENOSIS, BILATERAL: Primary | ICD-10-CM

## 2024-07-08 PROCEDURE — 99211 OFF/OP EST MAY X REQ PHY/QHP: CPT | Performed by: NURSE PRACTITIONER

## 2024-07-08 PROCEDURE — 1036F TOBACCO NON-USER: CPT | Performed by: NURSE PRACTITIONER

## 2024-07-08 PROCEDURE — 3074F SYST BP LT 130 MM HG: CPT | Performed by: NURSE PRACTITIONER

## 2024-07-08 PROCEDURE — 3078F DIAST BP <80 MM HG: CPT | Performed by: NURSE PRACTITIONER

## 2024-07-08 PROCEDURE — 1159F MED LIST DOCD IN RCRD: CPT | Performed by: NURSE PRACTITIONER

## 2024-07-08 PROCEDURE — 1111F DSCHRG MED/CURRENT MED MERGE: CPT | Performed by: NURSE PRACTITIONER

## 2024-07-08 ASSESSMENT — LIFESTYLE VARIABLES
HOW OFTEN DO YOU HAVE SIX OR MORE DRINKS ON ONE OCCASION: NEVER
HOW MANY STANDARD DRINKS CONTAINING ALCOHOL DO YOU HAVE ON A TYPICAL DAY: PATIENT DOES NOT DRINK
SKIP TO QUESTIONS 9-10: 1
HOW OFTEN DO YOU HAVE A DRINK CONTAINING ALCOHOL: NEVER
AUDIT-C TOTAL SCORE: 0

## 2024-07-08 ASSESSMENT — PATIENT HEALTH QUESTIONNAIRE - PHQ9
SUM OF ALL RESPONSES TO PHQ9 QUESTIONS 1 AND 2: 0
2. FEELING DOWN, DEPRESSED OR HOPELESS: NOT AT ALL
1. LITTLE INTEREST OR PLEASURE IN DOING THINGS: NOT AT ALL

## 2024-07-08 ASSESSMENT — ENCOUNTER SYMPTOMS
OCCASIONAL FEELINGS OF UNSTEADINESS: 0
DEPRESSION: 0
LOSS OF SENSATION IN FEET: 0

## 2024-07-08 NOTE — PROGRESS NOTES
History Of Present Illness  Charles Chan is a 79 y.o. male presenting status post carotid endarterectomy on the right.  Patient is here for postoperative appointment.  Overall he is doing well although states he feels quite fatigued.  He had to sit down after walking from the parking lot into the hospital for his appointment.  He states this is not normal for him.  Additionally, he denies any troubles with chewing or swallowing.  He denies a hoarse voice.  He denies lore-incisional numbness.  Steri-Strips have yet to fall off of the incision.  He notes that his blood pressure was high when he got home from the hospital.  He had readings in this 180s and 190s systolically.  He contacted his primary care provider who added clonidine to his medication regimen.  Blood pressures been much more well-controlled since that time.  Denies any symptoms of stroke, TIA, or amaurosis fugax.     Past Medical History  He has a past medical history of AAA (abdominal aortic aneurysm) (CMS-HCC), Anemia, Arthritis, BPH (benign prostatic hyperplasia), Carotid stenosis, Cellulitis, Chronic renal disease, stage 2, mildly decreased glomerular filtration rate (GFR) between 60-89 mL/min/1.73 square meter, CKD (chronic kidney disease), stage III (Multi), Coronary artery disease, Environmental allergies, GERD (gastroesophageal reflux disease), High cholesterol, HTN (hypertension), Hyperlipemia, Hypertension, Hypothyroidism, Overweight, Personal history of other diseases of the digestive system (10/27/2021), Prediabetes, Venous insufficiency of both lower extremities, and Venous stasis dermatitis of both lower extremities.    Surgical History  He has a past surgical history that includes Gallbladder surgery (06/13/2013); Colon surgery (06/13/2013); Appendectomy (06/13/2013); Coronary angioplasty; Cardiac catheterization (2008); and Vascular surgery (Right, 06/21/2024).     Social History  He reports that he quit smoking about 31 years ago.  His smoking use included cigarettes. He started smoking about 61 years ago. He has a 30 pack-year smoking history. He has never used smokeless tobacco. He reports that he does not currently use alcohol. He reports that he does not use drugs.    Family History  Family History   Problem Relation Name Age of Onset    Hypertension Mother      Heart disease Mother      Stroke Father      Diabetes Other      Hypertension Other          Allergies  Patient has no known allergies.    Review of Systems    CONSTITUTIONAL: Denies weight loss, fever and chills.    HEENT: Denies changes in vision and hearing.    RESPIRATORY: Denies SOB and cough.    CV: Denies palpitations and CP.    GI: Denies abdominal pain, nausea, vomiting and diarrhea.    : Denies dysuria and urinary frequency.    MSK: Denies myalgia and joint pain.    SKIN: Denies rash and pruritus.    VASC: Denies claudication, ischemic rest pain, or open wounds or sores    NEUROLOGICAL: Denies headache and syncope.    PSYCHIATRIC: Denies recent changes in mood. Denies anxiety and depression.     Physical Exam    General: Pt is alert and oriented x 3. Pleasant, conversive  HEENT: Head is atraumatic, normocephalic.  Right neck incision is well-approximated.  Steri-Strips were removed as they were curling up and close to falling off.  Cardiac: Normal S1-S2.  Regular rate and rhythm.  No murmurs.  Respiratory: Lungs clear to auscultation.  No adventitious sounds.  Abdomen: Soft, nondistended, nontender.  Bowel sounds x4 quadrants.  Pulse exam: Palpable brachial and radial pulses bilaterall.   femoral, popliteal, pedal pulses are palpable bilaterally.  Extremities: No significant edema noted.  Extremities are warm to the touch and normal in color.  No open wounds or sores.  Neuro: Moves all extremities spontaneously.  No focal deficits.  Psych: Appropriate affect.  Answers questions appropriately.     Last Recorded Vitals  /66   Pulse 53   Resp 18   Wt 92.8 kg (204  lb 8 oz)     Relevant Results    Current Outpatient Medications   Medication Instructions    alfuzosin (Uroxatral) 10 mg 24 hr tablet TAKE 1 TABLET BY MOUTH EVERY DAY    amLODIPine (Norvasc) 10 mg tablet TAKE 1 TABLET BY MOUTH EVERY DAY    ammonium lactate (Lac-Hydrin) 12 % lotion Apply and rub in a thin film to affected areas twice daily (am and pm)    aspirin 325 mg, oral, Daily    bacitracin 500 unit/gram ointment Topical, 2 times daily    cephalexin (KEFLEX) 500 mg, oral, 3 times daily    cholecalciferol (VITAMIN D-3) 5,000 Units, oral, Daily    cloNIDine (CATAPRES) 0.1 mg, oral, 3 times daily    clopidogrel (Plavix) 75 mg tablet TAKE 1 TABLET BY MOUTH EVERY DAY    cyanocobalamin (vitamin B-12) (VITAMIN B-12) 1,000 mcg, oral, Daily, As directed    econazole nitrate 1 % cream APPLY TO AFFECTED AREA EVERY DAY AS DIRECTED *30 DAY SUPPLY*    famotidine (Pepcid) 20 mg tablet TAKE 1 TABLET BY MOUTH TWICE A DAY    finasteride (Proscar) 5 mg tablet TAKE 1 TABLET BY MOUTH EVERY DAY    fluticasone (Flonase) 50 mcg/actuation nasal spray USE 1 SPRAY EVERY DAY IN EACH NOSTRIL *LASTS 60 DAYS*    metoprolol succinate XL (TOPROL-XL) 25 mg, oral, 2 times daily, DO NOT CRUSH OR CHEW    pantoprazole (ProtoNix) 40 mg EC tablet TAKE 1 TABLET BY MOUTH ONCE DAILY IN THE MORNING BEFORE A MEAL. DO NOT CRUSH, CHEW OR SPLIT    rosuvastatin (CRESTOR) 40 mg, oral, Daily    salmeterol (Serevent Diskus) 50 mcg/dose diskus inhaler 1 puff, inhalation, 2 times daily RT    triamcinolone (Kenalog) 0.1 % cream Topical, 2 times daily, to affected area           Assessment/Plan   Carotid stenosis  Status post carotid endarterectomy    Patient doing well post carotid endarterectomy.  His incision is well-approximated.  Steri-Strips were removed from the incision today.  His blood pressure seems to be much more controlled with the addition of clonidine.  This is being managed by his primary care provider.  Additionally, patient should follow-up in 1  month for a repeat carotid duplex.  Continue antiplatelet therapy and statin therapy.         Aj Ivy, APRN-CNP    show

## 2024-07-08 NOTE — PATIENT INSTRUCTIONS
Charles,    I am glad to see that you are doing well after surgery!  Thank you for choosing  vascular surgery for your care.    We will plan to do a carotid ultrasound in about 1 month.  This will give you a chance to continue to heal.  Continue to monitor your energy levels, I suspect that as time goes on you will have increased energy.  Please continue to take your Plavix and rosuvastatin.

## 2024-07-12 ENCOUNTER — APPOINTMENT (OUTPATIENT)
Dept: PRIMARY CARE | Facility: CLINIC | Age: 79
End: 2024-07-12
Payer: MEDICARE

## 2024-07-12 VITALS
HEIGHT: 72 IN | WEIGHT: 238 LBS | DIASTOLIC BLOOD PRESSURE: 62 MMHG | SYSTOLIC BLOOD PRESSURE: 124 MMHG | BODY MASS INDEX: 32.23 KG/M2

## 2024-07-12 DIAGNOSIS — L30.9 DERMATITIS: ICD-10-CM

## 2024-07-12 DIAGNOSIS — N18.30 CHRONIC RENAL FAILURE, STAGE 3 (MODERATE), UNSPECIFIED WHETHER STAGE 3A OR 3B CKD (MULTI): Primary | ICD-10-CM

## 2024-07-12 DIAGNOSIS — I87.8 VENOUS STASIS: ICD-10-CM

## 2024-07-12 DIAGNOSIS — I73.9 PERIPHERAL VASCULAR DISEASE (CMS-HCC): ICD-10-CM

## 2024-07-12 DIAGNOSIS — K21.9 GASTROESOPHAGEAL REFLUX DISEASE, UNSPECIFIED WHETHER ESOPHAGITIS PRESENT: ICD-10-CM

## 2024-07-12 DIAGNOSIS — I10 PRIMARY HYPERTENSION: ICD-10-CM

## 2024-07-12 DIAGNOSIS — E78.00 HYPERCHOLESTEROLEMIA: ICD-10-CM

## 2024-07-12 DIAGNOSIS — I87.2 VENOUS INSUFFICIENCY OF BOTH LOWER EXTREMITIES: ICD-10-CM

## 2024-07-12 PROCEDURE — 1111F DSCHRG MED/CURRENT MED MERGE: CPT | Performed by: INTERNAL MEDICINE

## 2024-07-12 PROCEDURE — 3078F DIAST BP <80 MM HG: CPT | Performed by: INTERNAL MEDICINE

## 2024-07-12 PROCEDURE — 99213 OFFICE O/P EST LOW 20 MIN: CPT | Performed by: INTERNAL MEDICINE

## 2024-07-12 PROCEDURE — 1159F MED LIST DOCD IN RCRD: CPT | Performed by: INTERNAL MEDICINE

## 2024-07-12 PROCEDURE — 3074F SYST BP LT 130 MM HG: CPT | Performed by: INTERNAL MEDICINE

## 2024-07-12 ASSESSMENT — ENCOUNTER SYMPTOMS
OCCASIONAL FEELINGS OF UNSTEADINESS: 0
DEPRESSION: 0
LOSS OF SENSATION IN FEET: 0

## 2024-07-13 NOTE — PROGRESS NOTES
Subjective   Patient ID: Charles Chan is a 79 y.o. male who presents for Follow-up (on various conditions.).    This gentleman today came here for follow-up on various conditions.  1. He brought a very fabulous blood pressure record.  He is keeping a good track on it and taking clonidine rightly.  2. He has a right carotid surgery done, it was success, doing good.  3. Venous stasis ulcer and venous edema, okay.  Kidney okay.    I have personally reviewed the patient's Past Medical History, Medications, Allergies, Social History, and Family History in the EMR.    Review of Systems   All other systems reviewed and are negative.    Objective   /62   Ht 1.829 m (6')   Wt 108 kg (238 lb)   BMI 32.28 kg/m²     Physical Exam  Vitals reviewed.   Cardiovascular:      Heart sounds: Normal heart sounds, S1 normal and S2 normal. No murmur heard.     No friction rub.   Pulmonary:      Effort: Pulmonary effort is normal.      Breath sounds: Normal breath sounds and air entry.   Abdominal:      Palpations: There is no hepatomegaly, splenomegaly or mass.   Musculoskeletal:      Right lower leg: Edema (minimal) present.      Left lower leg: Edema (minimal) present.   Lymphadenopathy:      Lower Body: No right inguinal adenopathy. No left inguinal adenopathy.   Skin:     Comments: Legs, venous stasis present, but no ulceration.   Neurological:      Cranial Nerves: Cranial nerves 2-12 are intact.      Sensory: No sensory deficit.      Motor: Motor function is intact.      Deep Tendon Reflexes: Reflexes are normal and symmetric.     LAB WORK:  Laboratory testing discussed, done last week.    Assessment/Plan   Problem List Items Addressed This Visit             ICD-10-CM       Cardiac and Vasculature    Hypertension I10    Relevant Orders    CBC    Magnesium    Ammonia    Hypercholesterolemia E78.00    Relevant Orders    Comprehensive Metabolic Panel    Venous insufficiency of both lower extremities I87.2    Relevant Orders     Referral to Vascular Medicine       Gastrointestinal and Abdominal    Esophageal reflux K21.9     Other Visit Diagnoses         Codes    Chronic renal failure, stage 3 (moderate), unspecified whether stage 3a or 3b CKD (Multi)    -  Primary N18.30    Peripheral vascular disease (CMS-HCC)     I73.9    Venous stasis     I87.8    Dermatitis     L30.9        1. Chronic renal failure, stage 3 secondary to hypertension, reasonably under control.  Keep an eye.  Drink more water and monitor salt.  He is doing that.  2. Hypertension.  Metoprolol and amlodipine stays.  Clonidine only if blood pressure go over 160 systolic and 95 diastolic.  Family is very aware of it.  Explained the reasoning ______ ACE and ARB.  3. Cholesterol, stable.  4. Peripheral vascular disease and venous stasis.  Referred to Dr. Jossy Silver for opinion.  He might benefit from pump at night.  Monitor.  5. Dermatitis, stable.  6. GERD, on PPI.  7. Follow-up appointment in two to three weeks time to check on the kidney, but happy to see him anytime sooner should it would be necessary.    Scribe Attestation  By signing my name below, ILeonie Scribe attest that this documentation has been prepared under the direction and in the presence of Moi Pryor MD.

## 2024-07-19 ENCOUNTER — LAB (OUTPATIENT)
Dept: LAB | Facility: LAB | Age: 79
End: 2024-07-19
Payer: MEDICARE

## 2024-07-19 ENCOUNTER — OFFICE VISIT (OUTPATIENT)
Dept: PRIMARY CARE | Facility: CLINIC | Age: 79
End: 2024-07-19
Payer: MEDICARE

## 2024-07-19 VITALS
BODY MASS INDEX: 32.64 KG/M2 | SYSTOLIC BLOOD PRESSURE: 126 MMHG | WEIGHT: 241 LBS | HEIGHT: 72 IN | DIASTOLIC BLOOD PRESSURE: 74 MMHG

## 2024-07-19 DIAGNOSIS — R00.1 BRADYCARDIA: ICD-10-CM

## 2024-07-19 DIAGNOSIS — E61.2 MAGNESIUM DEFICIENCY: ICD-10-CM

## 2024-07-19 DIAGNOSIS — N18.9 CHRONIC RENAL FAILURE, UNSPECIFIED CKD STAGE: ICD-10-CM

## 2024-07-19 DIAGNOSIS — L97.909 ULCER OF LOWER EXTREMITY, UNSPECIFIED LATERALITY, UNSPECIFIED ULCER STAGE (MULTI): ICD-10-CM

## 2024-07-19 DIAGNOSIS — I10 PRIMARY HYPERTENSION: Primary | ICD-10-CM

## 2024-07-19 PROCEDURE — 83735 ASSAY OF MAGNESIUM: CPT

## 2024-07-19 PROCEDURE — 1111F DSCHRG MED/CURRENT MED MERGE: CPT | Performed by: INTERNAL MEDICINE

## 2024-07-19 PROCEDURE — 3074F SYST BP LT 130 MM HG: CPT | Performed by: INTERNAL MEDICINE

## 2024-07-19 PROCEDURE — 3078F DIAST BP <80 MM HG: CPT | Performed by: INTERNAL MEDICINE

## 2024-07-19 PROCEDURE — 36415 COLL VENOUS BLD VENIPUNCTURE: CPT

## 2024-07-19 PROCEDURE — 99214 OFFICE O/P EST MOD 30 MIN: CPT | Performed by: INTERNAL MEDICINE

## 2024-07-19 PROCEDURE — 1159F MED LIST DOCD IN RCRD: CPT | Performed by: INTERNAL MEDICINE

## 2024-07-19 PROCEDURE — 93000 ELECTROCARDIOGRAM COMPLETE: CPT | Performed by: INTERNAL MEDICINE

## 2024-07-19 PROCEDURE — 80053 COMPREHEN METABOLIC PANEL: CPT

## 2024-07-19 ASSESSMENT — ENCOUNTER SYMPTOMS
OCCASIONAL FEELINGS OF UNSTEADINESS: 0
DEPRESSION: 0
LOSS OF SENSATION IN FEET: 0

## 2024-07-20 LAB
ALBUMIN SERPL BCP-MCNC: 3.6 G/DL (ref 3.4–5)
ALP SERPL-CCNC: 82 U/L (ref 33–136)
ALT SERPL W P-5'-P-CCNC: 12 U/L (ref 10–52)
ANION GAP SERPL CALC-SCNC: 12 MMOL/L (ref 10–20)
AST SERPL W P-5'-P-CCNC: 15 U/L (ref 9–39)
BILIRUB SERPL-MCNC: 0.6 MG/DL (ref 0–1.2)
BUN SERPL-MCNC: 38 MG/DL (ref 6–23)
CALCIUM SERPL-MCNC: 8.7 MG/DL (ref 8.6–10.6)
CHLORIDE SERPL-SCNC: 110 MMOL/L (ref 98–107)
CO2 SERPL-SCNC: 24 MMOL/L (ref 21–32)
CREAT SERPL-MCNC: 2.45 MG/DL (ref 0.5–1.3)
EGFRCR SERPLBLD CKD-EPI 2021: 26 ML/MIN/1.73M*2
GLUCOSE SERPL-MCNC: 97 MG/DL (ref 74–99)
MAGNESIUM SERPL-MCNC: 2.34 MG/DL (ref 1.6–2.4)
POTASSIUM SERPL-SCNC: 4.8 MMOL/L (ref 3.5–5.3)
PROT SERPL-MCNC: 6.2 G/DL (ref 6.4–8.2)
SODIUM SERPL-SCNC: 141 MMOL/L (ref 136–145)

## 2024-07-20 NOTE — PROGRESS NOTES
Subjective   Patient ID: Charles Chan is a 79 y.o. male who presents for Multiple medical issues.    Mr. Chan today came here for multiple medical issues.  1. He told me that metoprolol 150 mg is reduced to 125 mg because of bradycardia.  ______ 150 mg.  2. Now he was on metoprolol only 25 mg, but he was taking clonidine as directed two to three times a day to lower blood pressure, but it made bradycardia even worse. He is taking amlodipine.  Every time he stands up, he gets dizzy, drowsy, low blood pressure, feeling extremely tired.    I have personally reviewed the patient's Past Medical History, Medications, Allergies, Social History, and Family History in the EMR.    Review of Systems   All other systems reviewed and are negative.    Objective   /74   Ht 1.829 m (6')   Wt 109 kg (241 lb)   BMI 32.69 kg/m²     Physical Exam  Vitals reviewed.   Cardiovascular:      Heart sounds: Normal heart sounds, S1 normal and S2 normal. No murmur heard.     No friction rub.   Pulmonary:      Effort: Pulmonary effort is normal.      Breath sounds: Normal breath sounds and air entry.   Abdominal:      Palpations: There is no hepatomegaly, splenomegaly or mass.   Musculoskeletal:      Right lower leg: No edema.      Left lower leg: No edema.   Lymphadenopathy:      Lower Body: No right inguinal adenopathy. No left inguinal adenopathy.   Neurological:      Cranial Nerves: Cranial nerves 2-12 are intact.      Sensory: No sensory deficit.      Motor: Motor function is intact.      Deep Tendon Reflexes: Reflexes are normal and symmetric.     LAB WORK: EKG done.    Assessment/Plan   Problem List Items Addressed This Visit             ICD-10-CM       Cardiac and Vasculature    Hypertension - Primary I10     Other Visit Diagnoses         Codes    Bradycardia     R00.1    Relevant Orders    ECG 12 lead (Clinic Performed)    Chronic renal failure, unspecified CKD stage     N18.9    Magnesium deficiency     E61.2     Relevant Orders    Comprehensive Metabolic Panel (Completed)    Magnesium (Completed)    Ulcer of lower extremity, unspecified laterality, unspecified ulcer stage (Multi)     L97.142        1. Bradycardia.  I think it is a combination effect of metoprolol and clonidine.  I have to control his pressure better.  We are going to drop metoprolol completely.  Keep amlodipine and clonidine as needed two to three times a day.  2. Hypertension.  We are going to do Norvasc, we cannot to chose ACE and ARB and have clonidine as needed.  We are going to drop metoprolol.  3. Leg ulcer and infection. He is doing okay.  4. First-degree heart block.  Seeing cardiologist. I will keep an eye.  Rhythm is okay.  5. I shall see him back in a week to see what is going on.  6. EKG okay.    Scribe Attestation  By signing my name below, IHyacinth, Scribe attest that this documentation has been prepared under the direction and in the presence of Moi Pryor MD.

## 2024-07-22 ENCOUNTER — OFFICE VISIT (OUTPATIENT)
Dept: CARDIOLOGY | Facility: CLINIC | Age: 79
End: 2024-07-22
Payer: MEDICARE

## 2024-07-22 VITALS
SYSTOLIC BLOOD PRESSURE: 113 MMHG | BODY MASS INDEX: 32.91 KG/M2 | DIASTOLIC BLOOD PRESSURE: 67 MMHG | HEIGHT: 72 IN | WEIGHT: 243 LBS | OXYGEN SATURATION: 96 % | HEART RATE: 61 BPM

## 2024-07-22 DIAGNOSIS — I87.2 VENOUS INSUFFICIENCY OF BOTH LOWER EXTREMITIES: ICD-10-CM

## 2024-07-22 PROCEDURE — 1126F AMNT PAIN NOTED NONE PRSNT: CPT | Performed by: INTERNAL MEDICINE

## 2024-07-22 PROCEDURE — 1111F DSCHRG MED/CURRENT MED MERGE: CPT | Performed by: INTERNAL MEDICINE

## 2024-07-22 PROCEDURE — 3074F SYST BP LT 130 MM HG: CPT | Performed by: INTERNAL MEDICINE

## 2024-07-22 PROCEDURE — 3078F DIAST BP <80 MM HG: CPT | Performed by: INTERNAL MEDICINE

## 2024-07-22 PROCEDURE — 99203 OFFICE O/P NEW LOW 30 MIN: CPT | Performed by: INTERNAL MEDICINE

## 2024-07-22 PROCEDURE — 99213 OFFICE O/P EST LOW 20 MIN: CPT | Performed by: INTERNAL MEDICINE

## 2024-07-22 PROCEDURE — 1160F RVW MEDS BY RX/DR IN RCRD: CPT | Performed by: INTERNAL MEDICINE

## 2024-07-22 PROCEDURE — 1036F TOBACCO NON-USER: CPT | Performed by: INTERNAL MEDICINE

## 2024-07-22 PROCEDURE — 1159F MED LIST DOCD IN RCRD: CPT | Performed by: INTERNAL MEDICINE

## 2024-07-22 ASSESSMENT — PAIN SCALES - GENERAL: PAINLEVEL: 0-NO PAIN

## 2024-07-22 ASSESSMENT — ENCOUNTER SYMPTOMS
OCCASIONAL FEELINGS OF UNSTEADINESS: 1
LOSS OF SENSATION IN FEET: 0

## 2024-07-22 NOTE — PATIENT INSTRUCTIONS
I want you to get the [employer] Stocking Pau to help you put your socks on.    You should continue with compression socks every day.    Elevate your legs as much as you can!    I think getting in the pool for exercise is a great idea.     Should you have questions, please do not hesitate to call my office at [phone], or you can reach me on [employer] if you have signed up for it. If you have urgent concerns, call the office, do not use [employer].

## 2024-07-25 NOTE — PROGRESS NOTES
Referred by Marianne Pryor MD for chronic leg swelling    History of Present Illness:  Charles Chan is a/an 79 y.o. man with prior left leg cellulitis referred for chronic leg swelling that is improved with compression socks, although he has not been wearing them due to difficulty with donning (can't reach the floor due to spine issues; not flexible enough to cross his legs).    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 Legs are more swollen at the end of the day and better first thing in the morning. He has visible varicose veins that have been present for years.        Past Medical History:   Diagnosis Date    AAA (abdominal aortic aneurysm) (CMS-HCC)     Anemia     Arthritis     BPH (benign prostatic hyperplasia)     Carotid stenosis     Cellulitis     left leg    Chronic renal disease, stage 2, mildly decreased glomerular filtration rate (GFR) between 60-89 mL/min/1.73 square meter     CKD (chronic kidney disease), stage III (Multi)     Coronary artery disease     Environmental allergies     GERD (gastroesophageal reflux disease)     High cholesterol     HTN (hypertension)     Hyperlipemia     Hypertension     Hypothyroidism     Overweight     Personal history of other diseases of the digestive system 10/27/2021    History of gastroesophageal reflux (GERD)    Prediabetes     Venous insufficiency of both lower extremities     Venous stasis dermatitis of both lower extremities      Past Surgical History:   Procedure Laterality Date    APPENDECTOMY  06/13/2013    Appendectomy    CARDIAC CATHETERIZATION  2008    2 stents placed    COLON SURGERY  06/13/2013    partial colectomy for diverticular disease    CORONARY ANGIOPLASTY      GALLBLADDER SURGERY  06/13/2013    Gallbladder Surgery    VASCULAR SURGERY Right 06/21/2024     carotid endardectomy     Social History     Tobacco Use    Smoking status: Former     Current packs/day: 0.00     Average packs/day: 1 pack/day for 30.0 years (30.0 ttl pk-yrs)     Types: Cigarettes     Start date:      Quit date:      Years since quittin.5    Smokeless tobacco: Never   Vaping Use    Vaping status: Never Used   Substance Use Topics    Alcohol use: Never    Drug use: Never     Family History   Problem Relation Name Age of Onset    Hypertension Mother      Heart disease Mother      Stroke Father      Diabetes Other      Hypertension Other       Current Outpatient Medications   Medication Sig Dispense Refill    alfuzosin (Uroxatral) 10 mg 24 hr tablet TAKE 1 TABLET BY MOUTH EVERY DAY 90 tablet 1    amLODIPine (Norvasc) 10 mg tablet TAKE 1 TABLET BY MOUTH EVERY DAY 90 tablet 0    ammonium lactate (Lac-Hydrin) 12 % lotion Apply and rub in a thin film to affected areas twice daily (am and pm)      aspirin 325 mg tablet Take 1 tablet (325 mg) by mouth once daily.      bacitracin 500 unit/gram ointment Apply topically 2 times a day. 14 g 0    cephalexin (Keflex) 500 mg capsule Take 1 capsule (500 mg) by mouth 3 times a day. 45 capsule 0    cholecalciferol (Vitamin D-3) 5,000 Units tablet TAKE 1 TABLET BY MOUTH EVERY DAY 90 tablet 0    clopidogrel (Plavix) 75 mg tablet TAKE 1 TABLET BY MOUTH EVERY DAY 90 tablet 0    cyanocobalamin, vitamin B-12, (Vitamin B-12) 1,000 mcg tablet extended release Take 1 tablet (1,000 mcg) by mouth once daily. As directed      econazole nitrate 1 % cream APPLY TO AFFECTED AREA EVERY DAY AS DIRECTED *30 DAY SUPPLY* 85 g 0    famotidine (Pepcid) 20 mg tablet TAKE 1 TABLET BY MOUTH TWICE A  tablet 0    finasteride (Proscar) 5 mg tablet TAKE 1 TABLET BY MOUTH EVERY DAY 90 tablet 0    fluticasone (Flonase) 50 mcg/actuation nasal spray USE 1 SPRAY EVERY DAY IN EACH NOSTRIL *LASTS 60 DAYS* 48 mL 0    pantoprazole (ProtoNix) 40 mg EC tablet TAKE 1 TABLET BY MOUTH  ONCE DAILY IN THE MORNING BEFORE A MEAL. DO NOT CRUSH, CHEW OR SPLIT 90 tablet 0    rosuvastatin (Crestor) 40 mg tablet Take 1 tablet (40 mg) by mouth once daily. 90 tablet 1    salmeterol (Serevent Diskus) 50 mcg/dose diskus inhaler Inhale 1 puff 2 times a day. 1 each 0    triamcinolone (Kenalog) 0.1 % cream Apply topically 2 times a day. to affected area 80 g 0    metoprolol succinate XL (Toprol-XL) 25 mg 24 hr tablet Take 1 tablet (25 mg) by mouth 2 times a day. DO NOT CRUSH OR CHEW (Patient not taking: Reported on 7/22/2024) 180 tablet 0     No current facility-administered medications for this visit.       Physical Examination:  Blood pressure 113/67, pulse 61, height 1.829 m (6'), weight 110 kg (243 lb), SpO2 96%.  General: well-developed, well-nourished, no distress  Head: normocephalic, atraumatic  Eyes: PERRL, anicteric sclerae, no conjunctival injection  ENT: normal oropharynx  Neck: supple, no carotid bruits, no JVD  Lungs: normal respiratory effort, clear to auscultation bilaterally  Heart: regular, normal S1 and S2, no murmurs, rubs or gallops  Abdomen: normal active bowel sounds, soft, non-distended, non-tender  Extremities: no cyanosis or clubbing  Vascular: bilateral mild edema with spider and varicose veins, hyperpigmentation and dependent plethora of the gaiter areas, pulses 2+ and symmetric  Musculoskeletal: no deformities  Neurological: alert and oriented, no gross neurological deficits  Psychological: normal mood and affect   Skin: warm and dry, no rashes or lesions        Pertinent Labs:    Pertinent Imaging:    Diagnoses and all orders for this visit:  Venous insufficiency of both lower extremities  -     Referral to Vascular Medicine  -     General supply request [employer] Stocking Mai donning device  I want you to get the [employer] Stocking Mai to help you put your socks on.    You should continue with compression socks every day.    Elevate your legs as much as you can!    I think getting  in the pool for exercise is a great idea.     Should you have questions, please do not hesitate to call my office at [phone], or you can reach me on [employer] if you have signed up for it. If you have urgent concerns, call the office, do not use [employer].      Jossy Silver MD, MS

## 2024-07-26 ENCOUNTER — APPOINTMENT (OUTPATIENT)
Dept: PRIMARY CARE | Facility: CLINIC | Age: 79
End: 2024-07-26
Payer: MEDICARE

## 2024-07-26 VITALS
SYSTOLIC BLOOD PRESSURE: 144 MMHG | HEIGHT: 72 IN | WEIGHT: 243 LBS | BODY MASS INDEX: 32.91 KG/M2 | DIASTOLIC BLOOD PRESSURE: 66 MMHG

## 2024-07-26 DIAGNOSIS — E78.00 HYPERCHOLESTEROLEMIA: ICD-10-CM

## 2024-07-26 DIAGNOSIS — M79.606 PAIN OF LOWER EXTREMITY, UNSPECIFIED LATERALITY: ICD-10-CM

## 2024-07-26 DIAGNOSIS — I87.2 VENOUS STASIS DERMATITIS, UNSPECIFIED LATERALITY: Primary | ICD-10-CM

## 2024-07-26 DIAGNOSIS — M54.9 BACK PAIN, UNSPECIFIED BACK LOCATION, UNSPECIFIED BACK PAIN LATERALITY, UNSPECIFIED CHRONICITY: ICD-10-CM

## 2024-07-26 DIAGNOSIS — N18.9 CHRONIC RENAL IMPAIRMENT, UNSPECIFIED CKD STAGE: ICD-10-CM

## 2024-07-26 DIAGNOSIS — I10 HYPERTENSION, UNSPECIFIED TYPE: ICD-10-CM

## 2024-07-26 PROCEDURE — 3078F DIAST BP <80 MM HG: CPT | Performed by: INTERNAL MEDICINE

## 2024-07-26 PROCEDURE — 1124F ACP DISCUSS-NO DSCNMKR DOCD: CPT | Performed by: INTERNAL MEDICINE

## 2024-07-26 PROCEDURE — 1159F MED LIST DOCD IN RCRD: CPT | Performed by: INTERNAL MEDICINE

## 2024-07-26 PROCEDURE — 3077F SYST BP >= 140 MM HG: CPT | Performed by: INTERNAL MEDICINE

## 2024-07-26 PROCEDURE — 99213 OFFICE O/P EST LOW 20 MIN: CPT | Performed by: INTERNAL MEDICINE

## 2024-07-26 ASSESSMENT — ENCOUNTER SYMPTOMS
OCCASIONAL FEELINGS OF UNSTEADINESS: 0
DEPRESSION: 0
LOSS OF SENSATION IN FEET: 0

## 2024-07-27 NOTE — PROGRESS NOTES
Subjective   Patient ID: Charles Chan is a 79 y.o. male who presents for Legs swell up and Back Pain.    Charles today came here for follow-up.  Overall, he is a happy person.  Appetite and weight are okay.  No chest pain.  Taking medications regularly.  1. Legs swell up at the end of the day. Finally he saw Dr. Fenton once she agrees that this is a venous stasis.  No role for antibiotic and creams.  She saw the patient when it was not ______ glory.  Things are improving.  2. Back pain every now and then flares up.  He came here for follow-up on blood work.    I have personally reviewed the patient's Past Medical History, Medications, Allergies, Social History, and Family History in the EMR.         Review of Systems   All other systems reviewed and are negative.      Objective   /66   Ht 1.829 m (6')   Wt 110 kg (243 lb)   BMI 32.96 kg/m²     Physical Exam  Vitals reviewed.   Cardiovascular:      Heart sounds: Normal heart sounds, S1 normal and S2 normal. No murmur heard.     No friction rub.   Pulmonary:      Effort: Pulmonary effort is normal.      Breath sounds: Normal breath sounds and air entry.   Abdominal:      Palpations: There is no hepatomegaly, splenomegaly or mass.   Musculoskeletal:      Right lower leg: No edema.      Left lower leg: No edema.      Comments: Legs dependent edema as well as stasis dermatitis.   Lymphadenopathy:      Lower Body: No right inguinal adenopathy. No left inguinal adenopathy.   Neurological:      Cranial Nerves: Cranial nerves 2-12 are intact.      Sensory: No sensory deficit.      Motor: Motor function is intact.      Deep Tendon Reflexes: Reflexes are normal and symmetric.     LAB WORK:  Laboratory testing discussed.    Assessment/Plan   Problem List Items Addressed This Visit             ICD-10-CM       Cardiac and Vasculature    Hypertension I10    Hypercholesterolemia E78.00    Relevant Orders    Comprehensive metabolic panel       Musculoskeletal and Injuries     Leg pain M79.606    Relevant Orders    Referral to Physical Therapy     Other Visit Diagnoses         Codes    Venous stasis dermatitis, unspecified laterality    -  Primary I87.2    Back pain, unspecified back location, unspecified back pain laterality, unspecified chronicity     M54.9    Relevant Orders    Referral to Physical Therapy    Chronic renal impairment, unspecified CKD stage     N18.9        1. Stasis dermatitis, chronic renal insufficiency.  Stockings.  Monitor.  Therapy.  2. Chronic back pain.  Physical therapy.  3. Hypertension, okay.  4. High cholesterol, stable.  5. Magnesium, okay.  6. Follow-up appointment with me in August.  Happy to see him anytime sooner if necessary.  7. Blood sugar and blood pressure readings.  Sometimes the pressure goes up, the patient is taking clonidine.  No metoprolol.  Heart rate is okay.  I will monitor.  8. I will continue to follow.    Scribe Attestation  By signing my name below, ILeonie Scribe attest that this documentation has been prepared under the direction and in the presence of Moi Pryor MD.

## 2024-07-31 ENCOUNTER — EVALUATION (OUTPATIENT)
Dept: PHYSICAL THERAPY | Facility: HOSPITAL | Age: 79
End: 2024-07-31
Payer: MEDICARE

## 2024-07-31 DIAGNOSIS — M54.9 BACK PAIN, UNSPECIFIED BACK LOCATION, UNSPECIFIED BACK PAIN LATERALITY, UNSPECIFIED CHRONICITY: ICD-10-CM

## 2024-07-31 DIAGNOSIS — M54.50 CHRONIC BILATERAL LOW BACK PAIN: Primary | ICD-10-CM

## 2024-07-31 DIAGNOSIS — G89.29 CHRONIC BILATERAL LOW BACK PAIN: Primary | ICD-10-CM

## 2024-07-31 PROCEDURE — 97162 PT EVAL MOD COMPLEX 30 MIN: CPT | Mod: GP

## 2024-07-31 ASSESSMENT — PAIN SCALES - GENERAL: PAINLEVEL_OUTOF10: 0 - NO PAIN

## 2024-07-31 ASSESSMENT — ENCOUNTER SYMPTOMS
OCCASIONAL FEELINGS OF UNSTEADINESS: 0
LOSS OF SENSATION IN FEET: 0
DEPRESSION: 0

## 2024-07-31 ASSESSMENT — PAIN - FUNCTIONAL ASSESSMENT: PAIN_FUNCTIONAL_ASSESSMENT: 0-10

## 2024-07-31 NOTE — LETTER
July 31, 2024    Elieser Valverde, PT  [address]  Rehab Services  [facility] [address]    Patient: Charles Chan   YOB: 1945   Date of Visit: 7/31/2024       Dear Moi Pryor MD  [address] [place]  [facility]  [place] [address]    The attached plan of care is being sent to you because your patient’s medical reimbursement requires that you certify the plan of care. Your signature is required to allow uninterrupted insurance coverage.      You may indicate your approval by signing below and faxing this form back to us at Dept Fax: [phone].    Please call Dept: [phone] with any questions or concerns.    Thank you for this referral,        Elieser Valverde PT  [facility]  [address]  [place] [address]  [phone]    Payer: Payor: MEDICARE / Plan: MEDICARE PART A AND B / Product Type: *No Product type* /                                                                         Date:     Dear Elieser Valverde PT,     Re: Mr. Charles Chan, MRN:[id number]    I certify that I have reviewed the attached plan of care and it is medically necessary for Mr. Charles Chan (1945) who is under my care.          ______________________________________                    _________________  Provider name and credentials                                           Date and time                                                                                           Plan of Care 7/31/24   Effective from: 7/31/2024  Effective to: 8/30/2024    Plan ID: [id number]            Participants as of Finalize on 7/31/2024    Name Type Comments Contact Info    Moi Pryor MD PCP - General  [phone]    Elieser Valverde PT Physical Therapist  [phone]       Last Plan Note     Author: Elieser Valverde PT Status: Incomplete Last edited: 7/31/2024  9:45 AM       Physical Therapy    Physical Therapy Evaluation and Treatment       Patient Name: Charles Chan  MRN: [id number]  Today's Date: 7/31/2024    Time Entry:   Time Calculation  Start Time: 0950  Stop Time: 1020  Time Calculation (min): 30 min  PT Evaluation Time Entry  PT Evaluation (Moderate) Time Entry: 30     Assessment:  PT Assessment  PT Assessment Results: Decreased strength, Decreased range of motion, Pain  Rehab Prognosis: Good  Evaluation/Treatment Tolerance: Patient tolerated treatment well  Assessment Comment: Patient pressenting with LBP of insidious onset which is interfering with longer distance ambulation. He will benefit from core strengtheing program.     Plan:  OP PT Plan  Treatment/Interventions: Education/ Instruction, Neuromuscular re-education, Therapeutic activities, Therapeutic exercises  PT Plan: Skilled PT  PT Frequency: 2 times per week  Duration: 4 wks  Certification Period Start Date: 07/31/24  Certification Period End Date: 08/30/24  Rehab Potential: Good  Plan of Care Agreement: Patient    Current Problem:   1. Chronic bilateral low back pain  Follow Up In Physical Therapy      2. Back pain, unspecified back location, unspecified back pain laterality, unspecified chronicity  Referral to Physical Therapy          Subjective   General:  General  Reason for Referral: Eval and treat 2/2 back pain  Referred By: Dr. Pryor  Past Medical History Relevant to Rehab: The patient presents with a complaint of LBP which began insidiously after R carotid endarterectomy. None of his physicians have postulated a mechanism of injury. The patient is referred for a trial of PT.  General Comment: No rest pain. Pain noted with prolonged ambulation.    Precautions:  Precautions  STEADI Fall Risk Score (The score of 4 or more indicates an increased risk of falling): 0     Pain:  Pain Assessment  Pain Assessment: 0-10  0-10 (Numeric) Pain Score: 0 - No pain (Occurs only with prolonged ambulation.)    Objective  General Assessments:  Posture Comment: Flattened lumbar lordosis  posture.    Range of Motion Comments: Mod deficit bilateral lumbar extension, SB and rotation. LE AROM WNL.    Strength Comments: Bilateral hip extensors 3+/5, otherwise LE strength is 5/5. Abdominal ms strength poor on DLLT.    Flexibility Comment: Ight HS bilaterally at 60 degrees.    Palpation Comment: NTTP      Functional Assessments:  Gait Comment: Amb to dept without AD    Extremity/Trunk Assessments:     Lumbar Spine    Functional Rating Scale   Oswestry    Observation   No distress noted    Lumbar Palpation/Joint Mobility Assessment  NTTP   Lumbar AROM  See above     Hip AROM   Full AROM with exception of extension 5 degrees bilaterally.    Lumbar Myotomes  No focal weakness     Specific Lower Extremity MMT  5/5 except bilateral hip extension 3+/5     Dermatomes  No sensory level     Outcome Measures:  Patient did not fill out 2/2 late to appointment.      Treatments:  Patient instructed in HEP as noted below.     Access Code: [email or web address]  URL: [email or web address]  Date: 07/31/2024  Prepared by: Elieser Valverde    Exercises  - Child's Pose Stretch  - 1 x daily - 7 x weekly - 3 sets - 1 reps - 30 sec hold  - Prone Press Up On Elbows  - 1 x daily - 7 x weekly - 3 sets - 1 reps - 30 sec hold  - Prone Hip Extension  - 1 x daily - 7 x weekly - 3 sets - 10 reps  EDUCATION:  Outpatient Education  Individual(s) Educated: Patient  Education Provided: Body Mechanics, Home Exercise Program, POC (Patient started on HEP of CP flexion, BERLIN and alternate hip extension.)  Risk and Benefits Discussed with Patient/Caregiver/Other: yes  Patient Response to Education: Patient/Caregiver Verbalized Understanding of Information    Goals:  Active       PT Problem       The patient will demonstrate full understanding and competent performance of their home exercise program to maintain or potentially further improve their presenting condition.        Start:  07/31/24    Expected End:  08/30/24            The  patient will report a reduction in pain to 1/10 to allow them to return to required/desired ADL activities.        Start:  07/31/24    Expected End:  08/30/24            The patient will ambulate 10 minutes continuously without pain increasing to greater than 1/10 to allow return to required community ambulation tasks.        Start:  07/31/24    Expected End:  08/30/24            The patient will demonstrate increased core strength as evidenced by double leg lowering to 45  degrees with good lumbar stability  to improve core support in functional mobility tasks.        Start:  07/31/24    Expected End:  08/30/24            Improve Oswestry by >10 points.       Start:  07/31/24                           Current Participants as of 7/31/2024    Name Type Comments Contact Info    Moi Pryor MD PCP - General  [phone]    Signature pending    Elieser Valverde PT Physical Therapist  [phone]    Electronically signed by Elieser Valevrde PT at 7/31/2024 1029 EDT

## 2024-07-31 NOTE — PROGRESS NOTES
Physical Therapy    Physical Therapy Evaluation and Treatment      Patient Name: Charles Chan  MRN: [id number]  Today's Date: 7/31/2024    Time Entry:   Time Calculation  Start Time: 0950  Stop Time: 1020  Time Calculation (min): 30 min  PT Evaluation Time Entry  PT Evaluation (Moderate) Time Entry: 30     Assessment:  PT Assessment  PT Assessment Results: Decreased strength, Decreased range of motion, Pain  Rehab Prognosis: Good  Evaluation/Treatment Tolerance: Patient tolerated treatment well  Assessment Comment: Patient pressenting with LBP of insidious onset which is interfering with longer distance ambulation. He will benefit from core strengtheing program.     Plan:  OP PT Plan  Treatment/Interventions: Education/ Instruction, Neuromuscular re-education, Therapeutic activities, Therapeutic exercises  PT Plan: Skilled PT  PT Frequency: 2 times per week  Duration: 4 wks  Certification Period Start Date: 07/31/24  Certification Period End Date: 08/30/24  Rehab Potential: Good  Plan of Care Agreement: Patient    Current Problem:   1. Chronic bilateral low back pain  Follow Up In Physical Therapy      2. Back pain, unspecified back location, unspecified back pain laterality, unspecified chronicity  Referral to Physical Therapy          Subjective    General:  General  Reason for Referral: Eval and treat 2/2 back pain  Referred By: Dr. Pryor  Past Medical History Relevant to Rehab: The patient presents with a complaint of LBP which began insidiously after R carotid endarterectomy. None of his physicians have postulated a mechanism of injury. The patient is referred for a trial of PT.  General Comment: No rest pain. Pain noted with prolonged ambulation.    Precautions:  Precautions  STEADI Fall Risk Score (The score of 4 or more indicates an increased risk of falling): 0     Pain:  Pain Assessment  Pain Assessment: 0-10  0-10 (Numeric) Pain Score: 0 - No pain (Occurs only with prolonged ambulation.)    Objective    General Assessments:  Posture Comment: Flattened lumbar lordosis posture.    Range of Motion Comments: Mod deficit bilateral lumbar extension, SB and rotation. LE AROM WNL.    Strength Comments: Bilateral hip extensors 3+/5, otherwise LE strength is 5/5. Abdominal ms strength poor on DLLT.    Flexibility Comment: Ight HS bilaterally at 60 degrees.    Palpation Comment: NTTP      Functional Assessments:  Gait Comment: Amb to dept without AD    Extremity/Trunk Assessments:     Lumbar Spine    Functional Rating Scale   Oswestry    Observation   No distress noted    Lumbar Palpation/Joint Mobility Assessment  NTTP   Lumbar AROM  See above     Hip AROM   Full AROM with exception of extension 5 degrees bilaterally.    Lumbar Myotomes  No focal weakness     Specific Lower Extremity MMT  5/5 except bilateral hip extension 3+/5     Dermatomes  No sensory level     Outcome Measures:  Patient did not fill out 2/2 late to appointment.      Treatments:  Patient instructed in HEP as noted below.     Access Code: [email or web address]  URL: [email or web address]  Date: 07/31/2024  Prepared by: Elieser Valverde    Exercises  - Child's Pose Stretch  - 1 x daily - 7 x weekly - 3 sets - 1 reps - 30 sec hold  - Prone Press Up On Elbows  - 1 x daily - 7 x weekly - 3 sets - 1 reps - 30 sec hold  - Prone Hip Extension  - 1 x daily - 7 x weekly - 3 sets - 10 reps  EDUCATION:  Outpatient Education  Individual(s) Educated: Patient  Education Provided: Body Mechanics, Home Exercise Program, POC (Patient started on HEP of CP flexion, BERLIN and alternate hip extension.)  Risk and Benefits Discussed with Patient/Caregiver/Other: yes  Patient Response to Education: Patient/Caregiver Verbalized Understanding of Information    Goals:  Active       PT Problem       The patient will demonstrate full understanding and competent performance of their home exercise program to maintain or potentially further improve their presenting condition.         Start:  07/31/24    Expected End:  08/30/24            The patient will report a reduction in pain to 1/10 to allow them to return to required/desired ADL activities.        Start:  07/31/24    Expected End:  08/30/24            The patient will ambulate 10 minutes continuously without pain increasing to greater than 1/10 to allow return to required community ambulation tasks.        Start:  07/31/24    Expected End:  08/30/24            The patient will demonstrate increased core strength as evidenced by double leg lowering to 45  degrees with good lumbar stability  to improve core support in functional mobility tasks.        Start:  07/31/24    Expected End:  08/30/24            Improve Oswestry by >10 points.       Start:  07/31/24

## 2024-08-02 ENCOUNTER — TREATMENT (OUTPATIENT)
Dept: PHYSICAL THERAPY | Facility: HOSPITAL | Age: 79
End: 2024-08-02
Payer: MEDICARE

## 2024-08-02 DIAGNOSIS — G89.29 CHRONIC BILATERAL LOW BACK PAIN: ICD-10-CM

## 2024-08-02 DIAGNOSIS — M54.50 CHRONIC BILATERAL LOW BACK PAIN: ICD-10-CM

## 2024-08-02 PROCEDURE — 97110 THERAPEUTIC EXERCISES: CPT | Mod: GP

## 2024-08-02 ASSESSMENT — PAIN SCALES - GENERAL: PAINLEVEL_OUTOF10: 2

## 2024-08-02 ASSESSMENT — PAIN - FUNCTIONAL ASSESSMENT: PAIN_FUNCTIONAL_ASSESSMENT: 0-10

## 2024-08-02 NOTE — PROGRESS NOTES
Physical Therapy    Physical Therapy Treatment    Patient Name: Charles Chan  MRN: [id number]  Today's Date: 8/2/2024    Time Entry:   Time Calculation  Start Time: 0940  Stop Time: 1020  Time Calculation (min): 40 min     PT Therapeutic Procedures Time Entry  Therapeutic Exercise Time Entry: 40     Assessment:  PT Assessment  Assessment Comment: Building targeted ther ex program with good early response.    Plan:  OP PT Plan  PT Plan: Skilled PT (2/8)    Current Problem  1. Chronic bilateral low back pain  Follow Up In Physical Therapy          General  PT  Visit  PT Received On: 08/02/24  General  General Comment: No rest pain. Only with ambulation.    Subjective    Pain  Pain Assessment  Pain Assessment: 0-10  0-10 (Numeric) Pain Score: 2    Objective   Activity Tolerance:  Activity Tolerance  Activity Tolerance Comments: Some general muscle aches but no significant issues with advancing program.    Treatments:  Therapeutic Exercise  Therapeutic Exercise Performed: Yes  Therapeutic Exercise Activity 1: Physiostep x 10@15 W  Therapeutic Exercise Activity 2: Johnston hip extension 2x10 (will substitute hip extension in quad 2/2 patient comfort).)  Therapeutic Exercise Activity 3: Johnston back extension 3x10  Therapeutic Exercise Activity 4: Hip extension in quad 1x10  Therapeutic Exercise Activity 5: Prone press up 3x10  Therapeutic Exercise Activity 6: DB holds [id number] sec.  Therapeutic Exercise Activity 7: rectus stretch 3x30 sec  Therapeutic Exercise Activity 8: Hip abductor [id number]    OP EDUCATION:       Goals:  Active       PT Problem       The patient will demonstrate full understanding and competent performance of their home exercise program to maintain or potentially further improve their presenting condition.        Start:  07/31/24    Expected End:  08/30/24            The patient will report a reduction in pain to 1/10 to allow them to return to required/desired ADL activities.        Start:  07/31/24     Expected End:  08/30/24            The patient will ambulate 10 minutes continuously without pain increasing to greater than 1/10 to allow return to required community ambulation tasks.        Start:  07/31/24    Expected End:  08/30/24            The patient will demonstrate increased core strength as evidenced by double leg lowering to 45  degrees with good lumbar stability  to improve core support in functional mobility tasks.        Start:  07/31/24    Expected End:  08/30/24            Improve Oswestry by >10 points.       Start:  07/31/24

## 2024-08-03 ENCOUNTER — APPOINTMENT (OUTPATIENT)
Dept: PRIMARY CARE | Facility: CLINIC | Age: 79
End: 2024-08-03
Payer: MEDICARE

## 2024-08-05 ENCOUNTER — TREATMENT (OUTPATIENT)
Dept: PHYSICAL THERAPY | Facility: HOSPITAL | Age: 79
End: 2024-08-05
Payer: MEDICARE

## 2024-08-05 DIAGNOSIS — M54.50 CHRONIC BILATERAL LOW BACK PAIN: ICD-10-CM

## 2024-08-05 DIAGNOSIS — G89.29 CHRONIC BILATERAL LOW BACK PAIN: ICD-10-CM

## 2024-08-05 PROCEDURE — 97110 THERAPEUTIC EXERCISES: CPT | Mod: GP,CQ

## 2024-08-05 ASSESSMENT — PAIN SCALES - GENERAL: PAINLEVEL_OUTOF10: 5 - MODERATE PAIN

## 2024-08-05 ASSESSMENT — PAIN - FUNCTIONAL ASSESSMENT: PAIN_FUNCTIONAL_ASSESSMENT: 0-10

## 2024-08-05 NOTE — PROGRESS NOTES
Physical Therapy Treatment    Patient Name: Charles Chan  MRN: [id number]  Today's Date: 8/5/2024  Time Calculation  Start Time: 0930  Stop Time: 1009  Time Calculation (min): 39 min        PT Therapeutic Procedures Time Entry  Therapeutic Exercise Time Entry: 39                Insurance:  Visit number: 3 of 8  Authorization info: 20 authorized   Insurance Type: Medicare   Certification Period Start Date: 07/31/24  Certification Period End Date: 08/30/24      Current Problem  1. Chronic bilateral low back pain  Follow Up In Physical Therapy          General  Referred By: Dr. Pryor  Past Medical History Relevant to Rehab: The patient presents with a complaint of LBP which began insidiously after R carotid endarterectomy. None of his physicians have postulated a mechanism of injury. The patient is referred for a trial of PT.      Subjective   Current Condition:   Same  Patient reports he is feeling stiffness today.    Performing HEP?: Yes    Precautions  Precautions  Medical Precautions: Fall precautions  Pain  Pain Assessment: 0-10  0-10 (Numeric) Pain Score: 5 - Moderate pain  Pain Location: Back  Pain Orientation: Lower    Objective       Treatments:    Therapeutic Exercise  Therapeutic Exercise Performed: Yes    Physiostep x11 min @15W lvl 2, arenas back ext (using UE to assist sporadically) 2x10, alt hip extension in quad 2x10, prone press up 3x10, DB holds 1x10 10 sec, side lying hip abd 2x10, supine bridges 2x10, rectus stretch 3x30 sec, hip abd machine 3x10 40#       EDUCATION:   Individual(s) Educated: Patient   Education Provided: Techniques/Body Mechanics   Handout(s) Provided: Scanned into chart  Home Program: In previous notes  Risk and Benefits Discussed with Patient/Caregiver/Other: Yes   Patient/Caregiver Demonstrated Understanding: Yes   Patient Response to Education: Patient/Caregiver verbalized understanding of information and Patient/Caregiver performed return demonstration of  exercises/activities      Assessment:   Patient tolerated all interventions well with complaints of muscle fatigue post session. Adding in more exercises to increase challenge. Would continues to benefit from skilled services to address deficits and work towards goals         Plan:  Continue with POC and as per patient tolerated to improve ability to complete functional tasks  Frequency: 2 x Week  Duration: 4 Weeks       Goals:  Active       PT Problem       The patient will demonstrate full understanding and competent performance of their home exercise program to maintain or potentially further improve their presenting condition.        Start:  07/31/24    Expected End:  08/30/24            The patient will report a reduction in pain to 1/10 to allow them to return to required/desired ADL activities.        Start:  07/31/24    Expected End:  08/30/24            The patient will ambulate 10 minutes continuously without pain increasing to greater than 1/10 to allow return to required community ambulation tasks.        Start:  07/31/24    Expected End:  08/30/24            The patient will demonstrate increased core strength as evidenced by double leg lowering to 45  degrees with good lumbar stability  to improve core support in functional mobility tasks.        Start:  07/31/24    Expected End:  08/30/24            Improve Oswestry by >10 points.       Start:  07/31/24                 Carole Albarado, PTA

## 2024-08-07 ENCOUNTER — TREATMENT (OUTPATIENT)
Dept: PHYSICAL THERAPY | Facility: HOSPITAL | Age: 79
End: 2024-08-07
Payer: MEDICARE

## 2024-08-07 DIAGNOSIS — G89.29 CHRONIC BILATERAL LOW BACK PAIN: ICD-10-CM

## 2024-08-07 DIAGNOSIS — M54.50 CHRONIC BILATERAL LOW BACK PAIN: ICD-10-CM

## 2024-08-07 PROCEDURE — 97110 THERAPEUTIC EXERCISES: CPT | Mod: GP,CQ

## 2024-08-07 ASSESSMENT — PAIN SCALES - GENERAL: PAINLEVEL_OUTOF10: 5 - MODERATE PAIN

## 2024-08-07 ASSESSMENT — PAIN - FUNCTIONAL ASSESSMENT: PAIN_FUNCTIONAL_ASSESSMENT: 0-10

## 2024-08-07 NOTE — PROGRESS NOTES
Physical Therapy Treatment    Patient Name: Charles Chan  MRN: [id number]  Today's Date: 8/7/2024  Time Calculation  Start Time: 0925  Stop Time: 1005  Time Calculation (min): 40 min        PT Therapeutic Procedures Time Entry  Therapeutic Exercise Time Entry: 40                Insurance:  Visit number: 4 of 8  Authorization info: 20 authorized   Insurance Type: Medicare  Certification Period Start Date: 07/31/24  Certification Period End Date: 08/30/24      Current Problem  1. Chronic bilateral low back pain  Follow Up In Physical Therapy          General  Referred By: Dr. Pryor  Past Medical History Relevant to Rehab: The patient presents with a complaint of LBP which began insidiously after R carotid endarterectomy. None of his physicians have postulated a mechanism of injury. The patient is referred for a trial of PT.      Subjective   Current Condition:   Same  Patient reports he is feeling tired this morning. Stating he is having same levels of pain/stiffness as usual.     Performing HEP?: Yes    Precautions  Precautions  Medical Precautions: Fall precautions  Pain  Pain Assessment: 0-10  0-10 (Numeric) Pain Score: 5 - Moderate pain  Pain Location: Back  Pain Orientation: Lower    Objective     Activity Tolerance: Good    Treatments:    Therapeutic Exercise  Therapeutic Exercise Performed: Yes     Physiostep x10 min @15W lvl 2 to improve endurance/strength, arenas back ext (using UE to assist sporadically) 2x10, alt hip extension in quad 3x10, prone press up 3x10, DB holds 1x10 10 sec, side lying hip abd 2x10, supine bridges 2x10, rectus stretch 3x30 sec, hip abd machine 3x10 40#     EDUCATION:   Individual(s) Educated: Patient   Education Provided: Body Mechanics/Techniques   Handout(s) Provided: Scanned into chart  Home Program: In previous notes  Risk and Benefits Discussed with Patient/Caregiver/Other: Yes   Patient/Caregiver Demonstrated Understanding: Yes   Patient Response to Education:  Patient/Caregiver verbalized understanding of information and Patient/Caregiver performed return demonstration of exercises/activities    Assessment:   Patient continues to be challenged by all interventions, reporting increased muscle fatigue by end of session. Making small amount of progress         Plan:  Continue with POC and as per patient tolerated to improve ability to complete functional tasks  Frequency: 2 x Week  Duration: 4 Weeks       Goals:  Active       PT Problem       The patient will demonstrate full understanding and competent performance of their home exercise program to maintain or potentially further improve their presenting condition.        Start:  07/31/24    Expected End:  08/30/24            The patient will report a reduction in pain to 1/10 to allow them to return to required/desired ADL activities.        Start:  07/31/24    Expected End:  08/30/24            The patient will ambulate 10 minutes continuously without pain increasing to greater than 1/10 to allow return to required community ambulation tasks.        Start:  07/31/24    Expected End:  08/30/24            The patient will demonstrate increased core strength as evidenced by double leg lowering to 45  degrees with good lumbar stability  to improve core support in functional mobility tasks.        Start:  07/31/24    Expected End:  08/30/24            Improve Oswestry by >10 points.       Start:  07/31/24                 Carole Albarado, PTA

## 2024-08-09 ENCOUNTER — ANCILLARY PROCEDURE (OUTPATIENT)
Dept: VASCULAR MEDICINE | Facility: CLINIC | Age: 79
End: 2024-08-09
Payer: MEDICARE

## 2024-08-09 DIAGNOSIS — I65.23 CAROTID STENOSIS, BILATERAL: ICD-10-CM

## 2024-08-09 DIAGNOSIS — Z98.890 H/O ENDARTERECTOMY: ICD-10-CM

## 2024-08-09 DIAGNOSIS — I10 HYPERTENSION, UNSPECIFIED TYPE: ICD-10-CM

## 2024-08-09 PROCEDURE — 93880 EXTRACRANIAL BILAT STUDY: CPT | Performed by: INTERNAL MEDICINE

## 2024-08-09 PROCEDURE — 93880 EXTRACRANIAL BILAT STUDY: CPT

## 2024-08-09 RX ORDER — CLONIDINE HYDROCHLORIDE 0.1 MG/1
0.1 TABLET ORAL 4 TIMES DAILY
Qty: 120 TABLET | Refills: 0 | Status: SHIPPED | OUTPATIENT
Start: 2024-08-09

## 2024-08-12 ENCOUNTER — APPOINTMENT (OUTPATIENT)
Dept: PHYSICAL THERAPY | Facility: HOSPITAL | Age: 79
End: 2024-08-12
Payer: MEDICARE

## 2024-08-14 ENCOUNTER — APPOINTMENT (OUTPATIENT)
Dept: PHYSICAL THERAPY | Facility: HOSPITAL | Age: 79
End: 2024-08-14
Payer: MEDICARE

## 2024-08-15 ENCOUNTER — LAB (OUTPATIENT)
Dept: LAB | Facility: LAB | Age: 79
End: 2024-08-15
Payer: MEDICARE

## 2024-08-15 DIAGNOSIS — E78.00 HYPERCHOLESTEROLEMIA: ICD-10-CM

## 2024-08-15 DIAGNOSIS — I10 PRIMARY HYPERTENSION: ICD-10-CM

## 2024-08-15 LAB
ALBUMIN SERPL BCP-MCNC: 3.9 G/DL (ref 3.4–5)
ALP SERPL-CCNC: 84 U/L (ref 33–136)
ALT SERPL W P-5'-P-CCNC: 12 U/L (ref 10–52)
AMMONIA PLAS-SCNC: 26 UMOL/L (ref 16–53)
ANION GAP SERPL CALC-SCNC: 11 MMOL/L (ref 10–20)
AST SERPL W P-5'-P-CCNC: 15 U/L (ref 9–39)
BILIRUB SERPL-MCNC: 0.6 MG/DL (ref 0–1.2)
BUN SERPL-MCNC: 38 MG/DL (ref 6–23)
CALCIUM SERPL-MCNC: 9 MG/DL (ref 8.6–10.3)
CHLORIDE SERPL-SCNC: 112 MMOL/L (ref 98–107)
CO2 SERPL-SCNC: 23 MMOL/L (ref 21–32)
CREAT SERPL-MCNC: 2.46 MG/DL (ref 0.5–1.3)
EGFRCR SERPLBLD CKD-EPI 2021: 26 ML/MIN/1.73M*2
ERYTHROCYTE [DISTWIDTH] IN BLOOD BY AUTOMATED COUNT: 12.8 % (ref 11.5–14.5)
GLUCOSE SERPL-MCNC: 103 MG/DL (ref 74–99)
HCT VFR BLD AUTO: 36.4 % (ref 41–52)
HGB BLD-MCNC: 11.7 G/DL (ref 13.5–17.5)
MAGNESIUM SERPL-MCNC: 2.11 MG/DL (ref 1.6–2.4)
MCH RBC QN AUTO: 28.5 PG (ref 26–34)
MCHC RBC AUTO-ENTMCNC: 32.1 G/DL (ref 32–36)
MCV RBC AUTO: 89 FL (ref 80–100)
NRBC BLD-RTO: 0 /100 WBCS (ref 0–0)
PLATELET # BLD AUTO: 263 X10*3/UL (ref 150–450)
POTASSIUM SERPL-SCNC: 4.2 MMOL/L (ref 3.5–5.3)
PROT SERPL-MCNC: 6.8 G/DL (ref 6.4–8.2)
RBC # BLD AUTO: 4.11 X10*6/UL (ref 4.5–5.9)
SODIUM SERPL-SCNC: 142 MMOL/L (ref 136–145)
WBC # BLD AUTO: 8.1 X10*3/UL (ref 4.4–11.3)

## 2024-08-15 PROCEDURE — 85027 COMPLETE CBC AUTOMATED: CPT

## 2024-08-15 PROCEDURE — 83735 ASSAY OF MAGNESIUM: CPT

## 2024-08-15 PROCEDURE — 36415 COLL VENOUS BLD VENIPUNCTURE: CPT

## 2024-08-15 PROCEDURE — 82140 ASSAY OF AMMONIA: CPT

## 2024-08-15 PROCEDURE — 80053 COMPREHEN METABOLIC PANEL: CPT

## 2024-08-17 ENCOUNTER — APPOINTMENT (OUTPATIENT)
Dept: PRIMARY CARE | Facility: CLINIC | Age: 79
End: 2024-08-17
Payer: MEDICARE

## 2024-08-17 VITALS
WEIGHT: 237 LBS | SYSTOLIC BLOOD PRESSURE: 130 MMHG | DIASTOLIC BLOOD PRESSURE: 70 MMHG | BODY MASS INDEX: 32.1 KG/M2 | HEIGHT: 72 IN

## 2024-08-17 DIAGNOSIS — N18.9 CHRONIC RENAL IMPAIRMENT, UNSPECIFIED CKD STAGE: ICD-10-CM

## 2024-08-17 DIAGNOSIS — R60.9 EDEMA, UNSPECIFIED TYPE: ICD-10-CM

## 2024-08-17 DIAGNOSIS — I10 HYPERTENSION, UNSPECIFIED TYPE: ICD-10-CM

## 2024-08-17 DIAGNOSIS — R26.0 STAGGERING GAIT: Primary | ICD-10-CM

## 2024-08-17 DIAGNOSIS — I63.9 CEREBROVASCULAR ACCIDENT (CVA), UNSPECIFIED MECHANISM (MULTI): ICD-10-CM

## 2024-08-17 PROCEDURE — 1159F MED LIST DOCD IN RCRD: CPT | Performed by: INTERNAL MEDICINE

## 2024-08-17 PROCEDURE — 3078F DIAST BP <80 MM HG: CPT | Performed by: INTERNAL MEDICINE

## 2024-08-17 PROCEDURE — 99214 OFFICE O/P EST MOD 30 MIN: CPT | Performed by: INTERNAL MEDICINE

## 2024-08-17 PROCEDURE — 1123F ACP DISCUSS/DSCN MKR DOCD: CPT | Performed by: INTERNAL MEDICINE

## 2024-08-17 PROCEDURE — 3075F SYST BP GE 130 - 139MM HG: CPT | Performed by: INTERNAL MEDICINE

## 2024-08-17 RX ORDER — HYDROCHLOROTHIAZIDE 25 MG/1
25 TABLET ORAL DAILY
Qty: 30 TABLET | Refills: 0 | Status: SHIPPED | OUTPATIENT
Start: 2024-08-17 | End: 2024-09-16

## 2024-08-17 RX ORDER — POTASSIUM CHLORIDE 20 MEQ/1
20 TABLET, EXTENDED RELEASE ORAL DAILY
Qty: 30 TABLET | Refills: 0 | Status: SHIPPED | OUTPATIENT
Start: 2024-08-17 | End: 2025-08-17

## 2024-08-17 ASSESSMENT — ENCOUNTER SYMPTOMS
OCCASIONAL FEELINGS OF UNSTEADINESS: 0
DEPRESSION: 0
LOSS OF SENSATION IN FEET: 0

## 2024-08-17 NOTE — PROGRESS NOTES
Subjective   Patient ID: Charles Chan is a 79 y.o. male who presents for Hypertension.    Mr. Chan today came here for multiple medical issues.  1. Blood pressure high.  2. After he did carotid ultrasound, he is staggering to walk.  He almost passed out.  Wife has to support him all just after ultrasound.  He is worried about it.  He is staggering.  Appetite and weight are okay.  No problem.  Follow-up on other conditions and blood pressure.    I have personally reviewed the patient's Past Medical History, Medications, Allergies, Social History, and Family History in the EMR.    Review of Systems   All other systems reviewed and are negative.    Objective   /70   Ht 1.829 m (6')   Wt 108 kg (237 lb)   BMI 32.14 kg/m²     Physical Exam  Vitals reviewed.   Neck:      Comments: No carotid bruit.  Cardiovascular:      Heart sounds: Normal heart sounds, S1 normal and S2 normal. No murmur heard.     No friction rub.   Pulmonary:      Effort: Pulmonary effort is normal.      Breath sounds: Normal breath sounds and air entry.   Abdominal:      Palpations: There is no hepatomegaly, splenomegaly or mass.   Musculoskeletal:      Right lower leg: Edema present.      Left lower leg: Edema present.   Lymphadenopathy:      Lower Body: No right inguinal adenopathy. No left inguinal adenopathy.   Neurological:      Cranial Nerves: Cranial nerves 2-12 are intact.      Sensory: No sensory deficit.      Motor: Motor function is intact.      Deep Tendon Reflexes: Reflexes are normal and symmetric.     LAB WORK:  Laboratory testing discussed.    Assessment/Plan   Problem List Items Addressed This Visit             ICD-10-CM       Cardiac and Vasculature    Hypertension I10     Other Visit Diagnoses         Codes    Staggering gait    -  Primary R26.0    Cerebrovascular accident (CVA), unspecified mechanism (Multi)     I63.9    Relevant Orders    Basic Metabolic Panel    MR brain w and wo IV contrast    Chronic renal  impairment, unspecified CKD stage     N18.9    Relevant Medications    potassium chloride CR (Klor-Con M20) 20 mEq ER tablet    hydroCHLOROthiazide (HYDRODiuril) 25 mg tablet    Other Relevant Orders    Basic Metabolic Panel    Edema, unspecified type     R60.9        1. Staggering gait, passing out after carotid ultrasound.  I am wondering about embolism after carotid ultrasound before because of the pressure.  Immediately ordered MRI without contrast to rule out any stroke.  2. Hypertension, systolic high.  We discussed different option.  I am going to add hydrochlorothiazide and potassium at 4 in the afternoon to check it.  Clonidine and Norvasc continue.  Explained them pathophysiology of high blood pressure especially systolic and limitation because the patient is getting orthostatic.  3. Chronic renal failure, stage 3 to 4, stable.  4. Edema.  Lasix and potassium okay.  5. Follow-up with me in a week after above testing.  I thanked the patient for keeping meticulous record of blood pressure.    Scribe Attestation  By signing my name below, I, Gin Salazar attest that this documentation has been prepared under the direction and in the presence of Moi Pryor MD.

## 2024-08-19 ENCOUNTER — DOCUMENTATION (OUTPATIENT)
Dept: PHYSICAL THERAPY | Facility: HOSPITAL | Age: 79
End: 2024-08-19
Payer: MEDICARE

## 2024-08-19 NOTE — PROGRESS NOTES
Physical Therapy                 Therapy Communication Note    Patient Name: Charles Chan  MRN: [id number]  Today's Date: 8/19/2024     Discipline: Physical Therapy    Missed Visit Reason:      Missed Time: No Show    Comment: Patients' wife states that she texted us to cancel however we were unable to see that text.

## 2024-08-21 ENCOUNTER — OFFICE VISIT (OUTPATIENT)
Dept: VASCULAR SURGERY | Facility: CLINIC | Age: 79
End: 2024-08-21
Payer: MEDICARE

## 2024-08-21 VITALS
SYSTOLIC BLOOD PRESSURE: 144 MMHG | BODY MASS INDEX: 32.02 KG/M2 | HEART RATE: 57 BPM | WEIGHT: 236.4 LBS | DIASTOLIC BLOOD PRESSURE: 77 MMHG | HEIGHT: 72 IN | OXYGEN SATURATION: 98 %

## 2024-08-21 DIAGNOSIS — Z98.890 H/O CAROTID ENDARTERECTOMY: Primary | ICD-10-CM

## 2024-08-21 DIAGNOSIS — R26.89 BALANCE PROBLEM: ICD-10-CM

## 2024-08-21 DIAGNOSIS — I65.23 BILATERAL CAROTID ARTERY STENOSIS: ICD-10-CM

## 2024-08-21 PROCEDURE — 3074F SYST BP LT 130 MM HG: CPT | Performed by: NURSE PRACTITIONER

## 2024-08-21 PROCEDURE — 1159F MED LIST DOCD IN RCRD: CPT | Performed by: NURSE PRACTITIONER

## 2024-08-21 PROCEDURE — 1160F RVW MEDS BY RX/DR IN RCRD: CPT | Performed by: NURSE PRACTITIONER

## 2024-08-21 PROCEDURE — 1126F AMNT PAIN NOTED NONE PRSNT: CPT | Performed by: NURSE PRACTITIONER

## 2024-08-21 PROCEDURE — 1123F ACP DISCUSS/DSCN MKR DOCD: CPT | Performed by: NURSE PRACTITIONER

## 2024-08-21 PROCEDURE — 99211 OFF/OP EST MAY X REQ PHY/QHP: CPT | Performed by: NURSE PRACTITIONER

## 2024-08-21 PROCEDURE — 3078F DIAST BP <80 MM HG: CPT | Performed by: NURSE PRACTITIONER

## 2024-08-21 PROCEDURE — 1036F TOBACCO NON-USER: CPT | Performed by: NURSE PRACTITIONER

## 2024-08-21 ASSESSMENT — COLUMBIA-SUICIDE SEVERITY RATING SCALE - C-SSRS
6. HAVE YOU EVER DONE ANYTHING, STARTED TO DO ANYTHING, OR PREPARED TO DO ANYTHING TO END YOUR LIFE?: NO
1. IN THE PAST MONTH, HAVE YOU WISHED YOU WERE DEAD OR WISHED YOU COULD GO TO SLEEP AND NOT WAKE UP?: NO
2. HAVE YOU ACTUALLY HAD ANY THOUGHTS OF KILLING YOURSELF?: NO

## 2024-08-21 ASSESSMENT — PATIENT HEALTH QUESTIONNAIRE - PHQ9
SUM OF ALL RESPONSES TO PHQ9 QUESTIONS 1 AND 2: 0
1. LITTLE INTEREST OR PLEASURE IN DOING THINGS: NOT AT ALL
2. FEELING DOWN, DEPRESSED OR HOPELESS: NOT AT ALL

## 2024-08-21 ASSESSMENT — PAIN SCALES - GENERAL: PAINLEVEL: 0-NO PAIN

## 2024-08-21 NOTE — PROGRESS NOTES
"History Of Present Illness  Charles Chan is a 79 y.o. male presenting with a history of a right carotid endarterectomy in June 2024.  He continues to have fatigue that is abnormal for him.  This began after surgery.  He also reports a feeling of \"being off balance\".  He notes that when he is walking he feels like he wants to stagger to the right.  He feels like he is \"half drunk\".  In review of documentation, I do not note any previous mentions of feeling off balance.  He saw his primary care provider on 7/19/2024 who documented this finding.  He denies any focal symptoms of stroke, TIA, or amaurosis fugax.  His primary care provider has ordered an MRI of the brain.  Past Medical History  He has a past medical history of AAA (abdominal aortic aneurysm) (CMS-HCC), Anemia, Arthritis, BPH (benign prostatic hyperplasia), Carotid stenosis, Cellulitis, Chronic renal disease, stage 2, mildly decreased glomerular filtration rate (GFR) between 60-89 mL/min/1.73 square meter, CKD (chronic kidney disease), stage III (Multi), Coronary artery disease, Environmental allergies, GERD (gastroesophageal reflux disease), High cholesterol, HTN (hypertension), Hyperlipemia, Hypertension, Hypothyroidism, Overweight, Personal history of other diseases of the digestive system (10/27/2021), Prediabetes, Venous insufficiency of both lower extremities, and Venous stasis dermatitis of both lower extremities.    Surgical History  He has a past surgical history that includes Gallbladder surgery (06/13/2013); Colon surgery (06/13/2013); Appendectomy (06/13/2013); Coronary angioplasty; Cardiac catheterization (2008); and Vascular surgery (Right, 06/21/2024).     Social History  He reports that he quit smoking about 31 years ago. His smoking use included cigarettes. He started smoking about 61 years ago. He has a 30 pack-year smoking history. He has never used smokeless tobacco. He reports that he does not drink alcohol and does not use " "drugs.    Family History  Family History   Problem Relation Name Age of Onset    Hypertension Mother      Heart disease Mother      Stroke Father      Diabetes Other      Hypertension Other          Allergies  Patient has no known allergies.    Review of Systems    CONSTITUTIONAL: Denies weight loss, fever and chills.    HEENT: Denies changes in vision and hearing.    RESPIRATORY: Denies SOB and cough.    CV: Denies palpitations and CP.    GI: Denies abdominal pain, nausea, vomiting and diarrhea.    : Denies dysuria and urinary frequency.    MSK: Denies myalgia and joint pain.    SKIN: Denies rash and pruritus.    VASC: Denies claudication, ischemic rest pain, or open wounds or sores    NEUROLOGICAL: Denies headache and syncope.  Positive for balance problems \"staggering\"    PSYCHIATRIC: Denies recent changes in mood. Denies anxiety and depression.     Physical Exam    General: Pt is alert and oriented x 3. Pleasant, conversive  HEENT: Head is atraumatic, normocephalic.  Neck incision well-healed.  Cardiac: Normal S1-S2.  Regular rate and rhythm.  No murmurs.  Respiratory: Lungs clear to auscultation.  No adventitious sounds.  Abdomen: Soft, nondistended, nontender.  Bowel sounds x4 quadrants.  Pulse exam: Palpable brachial and radial pulses bilaterall.  Lower extremities are warm and well-perfused.  Extremities: No significant edema noted.  Extremities are warm to the touch and normal in color.  No open wounds or sores.  Neuro: Moves all extremities spontaneously.  No focal deficits.  Psych: Appropriate affect.  Answers questions appropriately.     Last Recorded Vitals  /77 (BP Location: Right arm)   Pulse 57   Wt 107 kg (236 lb 6.4 oz)   SpO2 98%     Relevant Results    Current Outpatient Medications   Medication Instructions    alfuzosin (Uroxatral) 10 mg 24 hr tablet TAKE 1 TABLET BY MOUTH EVERY DAY    amLODIPine (Norvasc) 10 mg tablet TAKE 1 TABLET BY MOUTH EVERY DAY    ammonium lactate (Lac-Hydrin) " 12 % lotion Apply and rub in a thin film to affected areas twice daily (am and pm)    aspirin 325 mg, oral, Daily    bacitracin 500 unit/gram ointment Topical, 2 times daily    cholecalciferol (VITAMIN D-3) 5,000 Units, oral, Daily    cloNIDine (CATAPRES) 0.1 mg, oral, 4 times daily    clopidogrel (Plavix) 75 mg tablet TAKE 1 TABLET BY MOUTH EVERY DAY    cyanocobalamin (vitamin B-12) (VITAMIN B-12) 1,000 mcg, oral, Daily, As directed    econazole nitrate 1 % cream APPLY TO AFFECTED AREA EVERY DAY AS DIRECTED *30 DAY SUPPLY*    famotidine (Pepcid) 20 mg tablet TAKE 1 TABLET BY MOUTH TWICE A DAY    finasteride (Proscar) 5 mg tablet TAKE 1 TABLET BY MOUTH EVERY DAY    fluticasone (Flonase) 50 mcg/actuation nasal spray USE 1 SPRAY EVERY DAY IN EACH NOSTRIL *LASTS 60 DAYS*    hydroCHLOROthiazide (HYDRODIURIL) 25 mg, oral, Daily    pantoprazole (ProtoNix) 40 mg EC tablet TAKE 1 TABLET BY MOUTH ONCE DAILY IN THE MORNING BEFORE A MEAL. DO NOT CRUSH, CHEW OR SPLIT    potassium chloride CR (Klor-Con M20) 20 mEq ER tablet 20 mEq, oral, Daily, Do not crush or chew.    rosuvastatin (CRESTOR) 40 mg, oral, Daily    salmeterol (Serevent Diskus) 50 mcg/dose diskus inhaler 1 puff, inhalation, 2 times daily RT    triamcinolone (Kenalog) 0.1 % cream Topical, 2 times daily, to affected area      Vascular US carotid artery duplex bilateral    Result Date: 8/9/2024           [place] [address]            Phone [phone]  Vascular Lab Report  [facility] US CAROTID ARTERY DUPLEX BILATERAL Patient Name:      CURTIS Peterson Physician:  [id number] Adriana Flores MD, RPVI Study Date:        8/9/2024            Ordering Provider:  [id number] MARY ANNE DARLING MRN/PID:           [id number]            Fellow: Accession#:        [id number]        Technologist:       Demi Alanis RVT Date  of Birth/Age: 1945 / 79 years Technologist 2: Gender:            M                   Encounter#:         [id number] Admission Status:  Outpatient          Location Performed: [facility]  Diagnosis/ICD: Occlusion and stenosis of bilateral carotid arteries-I65.23 CPT Codes:     36201 Cerebrovascular Carotid Duplex scan complete  Pertinent History: S/p right carotid endarterectomy 6/21/2024.  CONCLUSIONS: Right Carotid: Findings are consistent with less than 50% stenosis of the right proximal internal carotid artery. Widely patent endarterectomy site. Right external carotid artery appears patent with no evidence of stenosis. The right vertebral artery is patent with antegrade flow. No evidence of hemodynamically significant stenosis in the right subclavian artery. Left Carotid: Findings are consistent with less than 50% stenosis of the left proximal internal carotid artery. External carotid artery is not well visualized due to extensive calcified plaque shadowing. The left vertebral artery is patent with antegrade flow. No evidence of hemodynamically significant stenosis in the left subclavian artery. Endarterectomy: Patent right carotid endarterectomy site following endarterectomy.  Comparison: Compared with study from 5/3/2024, Widely patent right carotid endarterectomy site. Left internal carotid artery velocities are lower on today's exam.  Imaging & Doppler Findings:  Right Plaque Morph: The mid right common carotid artery demonstrates heterogenous plaque.  Left Plaque Morph: The proximal left internal carotid artery demonstrates heterogenous and calcified plaque. The mid left common carotid artery demonstrates homogenous and heterogenous plaque. The distal left common carotid artery demonstrates heterogenous and calcified plaque.   Right                        Left   PSV      EDV                PSV      EDV 61 cm/s            CCA P    157 cm/s 89 cm/s            CCA D    58 cm/s 70 cm/s  17 cm/s    ICA P    83 cm/s  17 cm/s 73 cm/s  22 cm/s   ICA M    118 cm/s 35 cm/s 59 cm/s  23 cm/s   ICA D    47 cm/s  13 cm/s 128 cm/s            ECA 41 cm/s  9 cm/s  Vertebral  28 cm/s  8 cm/s 153 cm/s         Subclavian 344 cm/s                Right Left ICA/CCA Ratio  0.8  1.4   [id number] Adriana Flores MD, RPVI Electronically signed by [id number] Adriana Flores MD, RPVI on 8/9/2024 at 2:20:23 PM  ** Final **            Assessment/Plan   Carotid stenosis  History of carotid endarterectomy  Fatigue  Balance problem    Status post carotid endarterectomy 6/21/2024.  I reviewed the carotid duplex independently from August 9, 2024.  Patient has less than 50% stenosis of bilateral internal carotid arteries.  This is appropriate status post surgery.  I recommend continuing aspirin and statin.    Unclear etiology of the patient's balance issues.  I agree with obtaining MRI of the brain, which was already ordered by primary care.      Follow-up in 6 months with a repeat carotid duplex and office visit.         Aj Ivy, APRN-CNP

## 2024-08-21 NOTE — PATIENT INSTRUCTIONS
Charles,    It was good to see you again!  Thank you for choosing  vascular surgery for your care.    We discussed the results of your carotid artery ultrasound which showed a widely patent right internal carotid artery after surgery.  I am unsure of why you are having balance problems, although I agree with having an MRI of your brain.  Follow-up in 6 months with a repeat carotid ultrasound.

## 2024-08-22 ENCOUNTER — APPOINTMENT (OUTPATIENT)
Dept: PHYSICAL THERAPY | Facility: HOSPITAL | Age: 79
End: 2024-08-22
Payer: MEDICARE

## 2024-08-23 ENCOUNTER — HOSPITAL ENCOUNTER (OUTPATIENT)
Dept: RADIOLOGY | Facility: CLINIC | Age: 79
Discharge: HOME | End: 2024-08-23
Payer: MEDICARE

## 2024-08-23 DIAGNOSIS — I63.9 CEREBROVASCULAR ACCIDENT (CVA), UNSPECIFIED MECHANISM (MULTI): ICD-10-CM

## 2024-08-23 PROCEDURE — 70551 MRI BRAIN STEM W/O DYE: CPT

## 2024-09-14 ENCOUNTER — APPOINTMENT (OUTPATIENT)
Dept: PRIMARY CARE | Facility: CLINIC | Age: 79
End: 2024-09-14
Payer: MEDICARE

## 2024-09-14 ENCOUNTER — LAB (OUTPATIENT)
Dept: LAB | Facility: LAB | Age: 79
End: 2024-09-14
Payer: MEDICARE

## 2024-09-14 VITALS
WEIGHT: 232 LBS | SYSTOLIC BLOOD PRESSURE: 126 MMHG | DIASTOLIC BLOOD PRESSURE: 72 MMHG | HEIGHT: 72 IN | BODY MASS INDEX: 31.42 KG/M2

## 2024-09-14 DIAGNOSIS — I10 HYPERTENSION, UNSPECIFIED TYPE: ICD-10-CM

## 2024-09-14 DIAGNOSIS — Z98.890 H/O CAROTID ENDARTERECTOMY: ICD-10-CM

## 2024-09-14 DIAGNOSIS — I63.9 CEREBROVASCULAR ACCIDENT (CVA), UNSPECIFIED MECHANISM (MULTI): ICD-10-CM

## 2024-09-14 DIAGNOSIS — E78.00 HYPERCHOLESTEROLEMIA: ICD-10-CM

## 2024-09-14 DIAGNOSIS — N18.9 CHRONIC RENAL IMPAIRMENT, UNSPECIFIED CKD STAGE: ICD-10-CM

## 2024-09-14 DIAGNOSIS — N18.30 CHRONIC RENAL FAILURE, STAGE 3 (MODERATE), UNSPECIFIED WHETHER STAGE 3A OR 3B CKD (MULTI): Primary | ICD-10-CM

## 2024-09-14 DIAGNOSIS — R41.3 MEMORY CHANGE: ICD-10-CM

## 2024-09-14 LAB
ALBUMIN SERPL BCP-MCNC: 3.9 G/DL (ref 3.4–5)
ALP SERPL-CCNC: 101 U/L (ref 33–136)
ALT SERPL W P-5'-P-CCNC: 9 U/L (ref 10–52)
ANION GAP SERPL CALC-SCNC: 13 MMOL/L (ref 10–20)
AST SERPL W P-5'-P-CCNC: 14 U/L (ref 9–39)
BILIRUB SERPL-MCNC: 0.5 MG/DL (ref 0–1.2)
BUN SERPL-MCNC: 43 MG/DL (ref 6–23)
CALCIUM SERPL-MCNC: 9.4 MG/DL (ref 8.6–10.6)
CHLORIDE SERPL-SCNC: 109 MMOL/L (ref 98–107)
CO2 SERPL-SCNC: 22 MMOL/L (ref 21–32)
CREAT SERPL-MCNC: 2.97 MG/DL (ref 0.5–1.3)
EGFRCR SERPLBLD CKD-EPI 2021: 20 ML/MIN/1.73M*2
ERYTHROCYTE [DISTWIDTH] IN BLOOD BY AUTOMATED COUNT: 12.1 % (ref 11.5–14.5)
GLUCOSE SERPL-MCNC: 101 MG/DL (ref 74–99)
HCT VFR BLD AUTO: 37 % (ref 41–52)
HGB BLD-MCNC: 12.2 G/DL (ref 13.5–17.5)
MCH RBC QN AUTO: 28.2 PG (ref 26–34)
MCHC RBC AUTO-ENTMCNC: 33 G/DL (ref 32–36)
MCV RBC AUTO: 86 FL (ref 80–100)
NRBC BLD-RTO: 0 /100 WBCS (ref 0–0)
PLATELET # BLD AUTO: 245 X10*3/UL (ref 150–450)
POTASSIUM SERPL-SCNC: 4.5 MMOL/L (ref 3.5–5.3)
PROT SERPL-MCNC: 7 G/DL (ref 6.4–8.2)
RBC # BLD AUTO: 4.33 X10*6/UL (ref 4.5–5.9)
SODIUM SERPL-SCNC: 139 MMOL/L (ref 136–145)
WBC # BLD AUTO: 9.9 X10*3/UL (ref 4.4–11.3)

## 2024-09-14 PROCEDURE — 3078F DIAST BP <80 MM HG: CPT | Performed by: INTERNAL MEDICINE

## 2024-09-14 PROCEDURE — 80053 COMPREHEN METABOLIC PANEL: CPT

## 2024-09-14 PROCEDURE — 99214 OFFICE O/P EST MOD 30 MIN: CPT | Performed by: INTERNAL MEDICINE

## 2024-09-14 PROCEDURE — 1123F ACP DISCUSS/DSCN MKR DOCD: CPT | Performed by: INTERNAL MEDICINE

## 2024-09-14 PROCEDURE — 3074F SYST BP LT 130 MM HG: CPT | Performed by: INTERNAL MEDICINE

## 2024-09-14 PROCEDURE — 36415 COLL VENOUS BLD VENIPUNCTURE: CPT

## 2024-09-14 PROCEDURE — 1159F MED LIST DOCD IN RCRD: CPT | Performed by: INTERNAL MEDICINE

## 2024-09-14 PROCEDURE — 85027 COMPLETE CBC AUTOMATED: CPT

## 2024-09-14 ASSESSMENT — ENCOUNTER SYMPTOMS
DEPRESSION: 0
OCCASIONAL FEELINGS OF UNSTEADINESS: 0
LOSS OF SENSATION IN FEET: 0

## 2024-09-15 NOTE — PROGRESS NOTES
Subjective   Patient ID: Charles Chan is a 79 y.o. male who presents for Follow-up.    It is always a delight to serve Mr. Chan.  He had a brain MRI taken.  He came here for follow-up on blood work.  Appetite and weight are okay.  No problem.  He is enjoying his retired life.    I have personally reviewed the patient's Past Medical History, Medications, Allergies, Social History, and Family History in the EMR.    Review of Systems   All other systems reviewed and are negative.    Objective   /72   Ht 1.829 m (6')   Wt 105 kg (232 lb)   BMI 31.46 kg/m²     Physical Exam  Vitals reviewed.   Cardiovascular:      Heart sounds: Normal heart sounds, S1 normal and S2 normal. No murmur heard.     No friction rub.   Pulmonary:      Effort: Pulmonary effort is normal.      Breath sounds: Normal breath sounds and air entry.   Abdominal:      Palpations: There is no hepatomegaly, splenomegaly or mass.   Musculoskeletal:      Right lower leg: No edema.      Left lower leg: No edema.   Lymphadenopathy:      Lower Body: No right inguinal adenopathy. No left inguinal adenopathy.   Neurological:      Cranial Nerves: Cranial nerves 2-12 are intact.      Sensory: No sensory deficit.      Motor: Motor function is intact.      Deep Tendon Reflexes: Reflexes are normal and symmetric.     LAB WORK:  Laboratory testing discussed.    Assessment/Plan   Problem List Items Addressed This Visit             ICD-10-CM       Cardiac and Vasculature    Hypertension I10    Relevant Orders    CBC (Completed)    CBC    Hypercholesterolemia E78.00    Relevant Orders    Comprehensive Metabolic Panel (Completed)    Comprehensive Metabolic Panel     Other Visit Diagnoses         Codes    Chronic renal failure, stage 3 (moderate), unspecified whether stage 3a or 3b CKD (Multi)    -  Primary N18.30    Memory change     R41.3    H/O carotid endarterectomy     Z98.890        1. Chronic renal failure, stage 3 to 4.  I am going to check BUN and  creatinine, doing okay.  2. Memory issue.  Brain MRI okay.  No stroke.  3. Status post carotid endarterectomy, doing good.  4. Hypertension, okay.  5. High cholesterol, stable.  6. Follow-up appointment in a month.  7. Today’s blood work, we will communicate over the phone.    Scribe Attestation  By signing my name below, ILeonie Scribe attest that this documentation has been prepared under the direction and in the presence of Moi Pryor MD.

## 2024-10-04 ENCOUNTER — LAB (OUTPATIENT)
Dept: LAB | Facility: LAB | Age: 79
End: 2024-10-04
Payer: MEDICARE

## 2024-10-04 DIAGNOSIS — E78.00 HYPERCHOLESTEROLEMIA: ICD-10-CM

## 2024-10-04 DIAGNOSIS — I10 HYPERTENSION, UNSPECIFIED TYPE: ICD-10-CM

## 2024-10-04 LAB
ALBUMIN SERPL BCP-MCNC: 3.7 G/DL (ref 3.4–5)
ALP SERPL-CCNC: 88 U/L (ref 33–136)
ALT SERPL W P-5'-P-CCNC: 9 U/L (ref 10–52)
ANION GAP SERPL CALC-SCNC: 15 MMOL/L (ref 10–20)
AST SERPL W P-5'-P-CCNC: 12 U/L (ref 9–39)
BILIRUB SERPL-MCNC: 0.7 MG/DL (ref 0–1.2)
BUN SERPL-MCNC: 36 MG/DL (ref 6–23)
CALCIUM SERPL-MCNC: 8.7 MG/DL (ref 8.6–10.3)
CHLORIDE SERPL-SCNC: 106 MMOL/L (ref 98–107)
CO2 SERPL-SCNC: 20 MMOL/L (ref 21–32)
CREAT SERPL-MCNC: 2.66 MG/DL (ref 0.5–1.3)
EGFRCR SERPLBLD CKD-EPI 2021: 23 ML/MIN/1.73M*2
ERYTHROCYTE [DISTWIDTH] IN BLOOD BY AUTOMATED COUNT: 12.6 % (ref 11.5–14.5)
GLUCOSE SERPL-MCNC: 110 MG/DL (ref 74–99)
HCT VFR BLD AUTO: 35.1 % (ref 41–52)
HGB BLD-MCNC: 11.1 G/DL (ref 13.5–17.5)
MCH RBC QN AUTO: 28.3 PG (ref 26–34)
MCHC RBC AUTO-ENTMCNC: 31.6 G/DL (ref 32–36)
MCV RBC AUTO: 90 FL (ref 80–100)
NRBC BLD-RTO: 0 /100 WBCS (ref 0–0)
PLATELET # BLD AUTO: 238 X10*3/UL (ref 150–450)
POTASSIUM SERPL-SCNC: 4 MMOL/L (ref 3.5–5.3)
PROT SERPL-MCNC: 6.2 G/DL (ref 6.4–8.2)
RBC # BLD AUTO: 3.92 X10*6/UL (ref 4.5–5.9)
SODIUM SERPL-SCNC: 137 MMOL/L (ref 136–145)
WBC # BLD AUTO: 9.2 X10*3/UL (ref 4.4–11.3)

## 2024-10-04 PROCEDURE — 36415 COLL VENOUS BLD VENIPUNCTURE: CPT

## 2024-10-04 PROCEDURE — 80053 COMPREHEN METABOLIC PANEL: CPT

## 2024-10-04 PROCEDURE — 85027 COMPLETE CBC AUTOMATED: CPT

## 2024-10-11 ENCOUNTER — HOSPITAL ENCOUNTER (OUTPATIENT)
Dept: CARDIOLOGY | Facility: CLINIC | Age: 79
Discharge: HOME | End: 2024-10-11
Payer: MEDICARE

## 2024-10-11 ENCOUNTER — OFFICE VISIT (OUTPATIENT)
Dept: CARDIOLOGY | Facility: CLINIC | Age: 79
End: 2024-10-11
Payer: MEDICARE

## 2024-10-11 VITALS
WEIGHT: 234 LBS | OXYGEN SATURATION: 97 % | DIASTOLIC BLOOD PRESSURE: 56 MMHG | HEART RATE: 50 BPM | SYSTOLIC BLOOD PRESSURE: 108 MMHG | BODY MASS INDEX: 31.69 KG/M2 | HEIGHT: 72 IN

## 2024-10-11 DIAGNOSIS — I25.10 CORONARY ARTERY DISEASE INVOLVING NATIVE CORONARY ARTERY OF NATIVE HEART WITHOUT ANGINA PECTORIS: ICD-10-CM

## 2024-10-11 DIAGNOSIS — I10 PRIMARY HYPERTENSION: Primary | ICD-10-CM

## 2024-10-11 DIAGNOSIS — I25.10 ARTERIOSCLEROTIC CARDIOVASCULAR DISEASE: ICD-10-CM

## 2024-10-11 LAB — BODY SURFACE AREA: 2.32 M2

## 2024-10-11 PROCEDURE — 93306 TTE W/DOPPLER COMPLETE: CPT

## 2024-10-11 PROCEDURE — 3078F DIAST BP <80 MM HG: CPT | Performed by: INTERNAL MEDICINE

## 2024-10-11 PROCEDURE — 1126F AMNT PAIN NOTED NONE PRSNT: CPT | Performed by: INTERNAL MEDICINE

## 2024-10-11 PROCEDURE — 2500000004 HC RX 250 GENERAL PHARMACY W/ HCPCS (ALT 636 FOR OP/ED): Performed by: NURSE PRACTITIONER

## 2024-10-11 PROCEDURE — 1160F RVW MEDS BY RX/DR IN RCRD: CPT | Performed by: INTERNAL MEDICINE

## 2024-10-11 PROCEDURE — 1036F TOBACCO NON-USER: CPT | Performed by: INTERNAL MEDICINE

## 2024-10-11 PROCEDURE — 99214 OFFICE O/P EST MOD 30 MIN: CPT | Performed by: INTERNAL MEDICINE

## 2024-10-11 PROCEDURE — 1159F MED LIST DOCD IN RCRD: CPT | Performed by: INTERNAL MEDICINE

## 2024-10-11 PROCEDURE — 3074F SYST BP LT 130 MM HG: CPT | Performed by: INTERNAL MEDICINE

## 2024-10-11 PROCEDURE — 1123F ACP DISCUSS/DSCN MKR DOCD: CPT | Performed by: INTERNAL MEDICINE

## 2024-10-11 RX ORDER — CLONIDINE HYDROCHLORIDE 0.1 MG/1
0.1 TABLET ORAL 2 TIMES DAILY
Qty: 180 TABLET | Refills: 3 | Status: SHIPPED | OUTPATIENT
Start: 2024-10-11 | End: 2025-10-11

## 2024-10-11 RX ORDER — AMLODIPINE BESYLATE 10 MG/1
10 TABLET ORAL DAILY
Qty: 90 TABLET | Refills: 3 | Status: SHIPPED | OUTPATIENT
Start: 2024-10-11 | End: 2025-10-11

## 2024-10-11 ASSESSMENT — ENCOUNTER SYMPTOMS
LOSS OF SENSATION IN FEET: 0
DEPRESSION: 0
OCCASIONAL FEELINGS OF UNSTEADINESS: 1

## 2024-10-11 ASSESSMENT — PAIN SCALES - GENERAL: PAINLEVEL: 0-NO PAIN

## 2024-10-11 NOTE — PROGRESS NOTES
Subjective   Charles Chan is a 79 y.o. male.    Chief Complaint:  Follow-up    HPI    Review of Systems   All other systems reviewed and are negative.      Objective   Cardiovascular:      PMI at left midclavicular line. Normal rate. Regular rhythm. Normal S1. Normal S2.       Murmurs: There is no murmur.      No gallop.  No click. No rub.   Pulses:     Intact distal pulses.   Edema:     Peripheral edema absent.       Lab Review:   Lab on 09/11/2023   Component Date Value    Glucose 09/11/2023 88     Sodium 09/11/2023 141     Potassium 09/11/2023 4.5     Chloride 09/11/2023 109 (H)     Bicarbonate 09/11/2023 24     Anion Gap 09/11/2023 13     Urea Nitrogen 09/11/2023 24 (H)     Creatinine 09/11/2023 1.88 (H)     GFR MALE 09/11/2023 36 (A)     Calcium 09/11/2023 8.8     Albumin 09/11/2023 3.7     Alkaline Phosphatase 09/11/2023 77     Total Protein 09/11/2023 6.3 (L)     AST 09/11/2023 15     Total Bilirubin 09/11/2023 0.6     ALT (SGPT) 09/11/2023 12     Cholesterol 09/11/2023 122     HDL 09/11/2023 43.6     Cholesterol/HDL Ratio 09/11/2023 2.8     LDL 09/11/2023 65     VLDL 09/11/2023 14     Triglycerides 09/11/2023 69     TSH 09/11/2023 2.03     Hemoglobin A1C 09/11/2023 5.2     Estimated Average Glucose 09/11/2023 103     Prostate Specific Antige* 09/11/2023 0.44    Lab on 08/14/2023   Component Date Value    WBC 08/14/2023 7.8     RBC 08/14/2023 4.36 (L)     Hemoglobin 08/14/2023 12.7 (L)     Hematocrit 08/14/2023 39.3 (L)     MCV 08/14/2023 90     MCHC 08/14/2023 32.3     Platelets 08/14/2023 249     RDW 08/14/2023 12.1     Glucose 08/14/2023 94     Sodium 08/14/2023 138     Potassium 08/14/2023 4.5     Chloride 08/14/2023 106     Bicarbonate 08/14/2023 23     Anion Gap 08/14/2023 14     Urea Nitrogen 08/14/2023 26 (H)     Creatinine 08/14/2023 1.98 (H)     GFR MALE 08/14/2023 33 (A)     Calcium 08/14/2023 8.4 (L)     Albumin 08/14/2023 3.7     Alkaline Phosphatase 08/14/2023 78     Total Protein 08/14/2023  6.6     AST 08/14/2023 14     Total Bilirubin 08/14/2023 0.6     ALT (SGPT) 08/14/2023 11    Lab on 07/08/2023   Component Date Value    WBC 07/08/2023 8.9     nRBC 07/08/2023 0.0     RBC 07/08/2023 4.55     Hemoglobin 07/08/2023 13.2 (L)     Hematocrit 07/08/2023 40.7 (L)     MCV 07/08/2023 89     MCHC 07/08/2023 32.4     Platelets 07/08/2023 258     RDW 07/08/2023 12.3     Glucose 07/08/2023 103 (H)     Sodium 07/08/2023 139     Potassium 07/08/2023 5.0     Chloride 07/08/2023 107     Bicarbonate 07/08/2023 25     Anion Gap 07/08/2023 12     Urea Nitrogen 07/08/2023 37 (H)     Creatinine 07/08/2023 2.09 (H)     GFR MALE 07/08/2023 31 (A)     Calcium 07/08/2023 9.6     Albumin 07/08/2023 4.0     Alkaline Phosphatase 07/08/2023 78     Total Protein 07/08/2023 7.0     AST 07/08/2023 13     Total Bilirubin 07/08/2023 0.8     ALT (SGPT) 07/08/2023 10     Color, Urine 07/08/2023 YELLOW     Appearance, Urine 07/08/2023 CLEAR     Specific Gravity, Urine 07/08/2023 1.021     pH, Urine 07/08/2023 5.0     Protein, Urine 07/08/2023 30 (1+) (A)     Glucose, Urine 07/08/2023 NEGATIVE     Blood, Urine 07/08/2023 NEGATIVE     Ketones, Urine 07/08/2023 NEGATIVE     Bilirubin, Urine 07/08/2023 NEGATIVE     Urobilinogen, Urine 07/08/2023 <2.0     Nitrite, Urine 07/08/2023 NEGATIVE     Leukocyte Esterase, Urine 07/08/2023 NEGATIVE     Urine Culture 07/08/2023 Culture Comments - See Below     WBC, Urine 07/08/2023 <1     RBC, Urine 07/08/2023 1     Mucus, Urine 07/08/2023 1+     Hyaline Casts, Urine 07/08/2023 OCC (A)    Lab on 06/03/2023   Component Date Value    Glucose 06/03/2023 98     Sodium 06/03/2023 139     Potassium 06/03/2023 4.5     Chloride 06/03/2023 107     Bicarbonate 06/03/2023 24     Anion Gap 06/03/2023 13     Urea Nitrogen 06/03/2023 29 (H)     Creatinine 06/03/2023 2.39 (H)     GFR MALE 06/03/2023 27 (A)     Calcium 06/03/2023 8.9     Albumin 06/03/2023 3.9     Alkaline Phosphatase 06/03/2023 82     Total Protein  06/03/2023 6.6     AST 06/03/2023 18     Total Bilirubin 06/03/2023 1.0     ALT (SGPT) 06/03/2023 13    Lab on 04/21/2023   Component Date Value    Glucose 04/21/2023 83     Sodium 04/21/2023 139     Potassium 04/21/2023 4.4     Chloride 04/21/2023 106     Bicarbonate 04/21/2023 26     Anion Gap 04/21/2023 11     Urea Nitrogen 04/21/2023 29 (H)     Creatinine 04/21/2023 2.42 (H)     GFR MALE 04/21/2023 26 (A)     Calcium 04/21/2023 8.9        Assessment/Plan   The primary encounter diagnosis was Shortness of breath. Diagnoses of Bradycardia, Atherosclerotic heart disease of native coronary artery without angina pectoris, and Primary hypertension were also pertinent to this visit.  1. CAD with unstable angina May 2009.  2. CAD with PCI and drug eluting stent to the mid and distal LCX and   proximal RCA May 18, 2009. Please refer to prior office notes for   review.  3. Repeat cardiac catheterization February 8, 2010 with stents widely patent.  The patient is complaining of increasing shortness of breath particularly when climbing stairs as well as lightheadedness when standing up quickly.  As discussed below his heart rate is slow and blood pressure low related to medication.  His dose of metoprolol will be reduced and hydralazine discontinued.  He will return in 8 weeks for follow-up visit.  Patient currently not on metoprolol but on clonidine 0.1 mg 4 times daily with a resting heart rate of 50/min.  The dose of clonidine will be reduced to 0.1 mg twice daily.  Echocardiogram performed office visit 10/11/2024 demonstrates mild concentric LVH preserved LV ejection fraction 60 to 65% plus atrial septal aneurysm.  Patient feeling well with some chronic fatigue.  He seems to bows slightly sometimes when walking.  4. Hypertension.  The patient's blood pressure is well within normal range and he is relatively bradycardic.  EKG today 10/6/2023 demonstrates a sinus bradycardia at 38/min first-degree AV block IA interval 226  ms.  The patient's metoprolol tartrate will be reduced from 50 mg twice daily to 25 mg twice daily and subsequently changed to Toprol-XL 50 mg daily.  Patient will stop hydralazine 25 mg 3 times daily.  Return to office in 8 weeks.  Patient states that systolic blood pressures at home are in the 120-130 mmHg range.  Blood pressure slightly less than that during office visit 10/11/2024.  Patient denies any lightheadedness or presyncope.  Not currently taking metoprolol.  He evidently had been taking clonidine 0.1 mg 4 times daily and it will be reduced to twice daily.  Patient remains on amlodipine 10 mg daily.  HCTZ and Klor-Con should be discontinued.      5. Renal insufficiency. Recent creatinine 1.88 when checked on 9/11/2023.  Lab work from 10/4/2024 includes a SMA panel creatinine of 2.66 potassium of 4.0.  The hematocrit is 35.1 presumed chronic anemia due to his CKD.      6. Remote former history of tobacco use with cessation since 1991.  7. Hyperlipidemia. Recent lipid panel showed a total cholesterol 122 LDL 65 HDL 43 triglycerides 69.  Labs were performed 9/11/2023.  8. Questionable left adrenal adenoma.  9. Mild DJD of the right shoulder. Patient will continue to follow up with Dr. Dick   10.BPH.  11. GERD  12.  Status post right carotid endarterectomy 6/21/2024.  Patient had follow-up carotid ultrasound on 8/9/2024 showing less than 50% stenoses bilaterally.

## 2024-10-12 ENCOUNTER — APPOINTMENT (OUTPATIENT)
Dept: PRIMARY CARE | Facility: CLINIC | Age: 79
End: 2024-10-12
Payer: MEDICARE

## 2024-10-12 VITALS
BODY MASS INDEX: 31.83 KG/M2 | WEIGHT: 235 LBS | HEIGHT: 72 IN | SYSTOLIC BLOOD PRESSURE: 118 MMHG | DIASTOLIC BLOOD PRESSURE: 56 MMHG

## 2024-10-12 DIAGNOSIS — Z23 IMMUNIZATION DUE: ICD-10-CM

## 2024-10-12 DIAGNOSIS — I25.10 CORONARY ARTERY DISEASE INVOLVING NATIVE CORONARY ARTERY OF NATIVE HEART WITHOUT ANGINA PECTORIS: ICD-10-CM

## 2024-10-12 DIAGNOSIS — E66.3 OVERWEIGHT: ICD-10-CM

## 2024-10-12 DIAGNOSIS — N18.30 CHRONIC RENAL FAILURE, STAGE 3 (MODERATE), UNSPECIFIED WHETHER STAGE 3A OR 3B CKD (MULTI): ICD-10-CM

## 2024-10-12 DIAGNOSIS — J06.9 VIRAL URI: ICD-10-CM

## 2024-10-12 DIAGNOSIS — D63.1 ANEMIA OF CHRONIC RENAL FAILURE, STAGE 3 (MODERATE), UNSPECIFIED WHETHER STAGE 3A OR 3B CKD (MULTI): ICD-10-CM

## 2024-10-12 DIAGNOSIS — I10 HYPERTENSION, UNSPECIFIED TYPE: ICD-10-CM

## 2024-10-12 DIAGNOSIS — I87.2 VENOUS STASIS DERMATITIS, UNSPECIFIED LATERALITY: Primary | ICD-10-CM

## 2024-10-12 DIAGNOSIS — E78.00 HYPERCHOLESTEROLEMIA: ICD-10-CM

## 2024-10-12 DIAGNOSIS — N18.30 ANEMIA OF CHRONIC RENAL FAILURE, STAGE 3 (MODERATE), UNSPECIFIED WHETHER STAGE 3A OR 3B CKD (MULTI): ICD-10-CM

## 2024-10-12 PROCEDURE — 1123F ACP DISCUSS/DSCN MKR DOCD: CPT | Performed by: INTERNAL MEDICINE

## 2024-10-12 PROCEDURE — G0008 ADMIN INFLUENZA VIRUS VAC: HCPCS | Performed by: INTERNAL MEDICINE

## 2024-10-12 PROCEDURE — 90662 IIV NO PRSV INCREASED AG IM: CPT | Performed by: INTERNAL MEDICINE

## 2024-10-12 PROCEDURE — 3078F DIAST BP <80 MM HG: CPT | Performed by: INTERNAL MEDICINE

## 2024-10-12 PROCEDURE — 99214 OFFICE O/P EST MOD 30 MIN: CPT | Performed by: INTERNAL MEDICINE

## 2024-10-12 PROCEDURE — 3074F SYST BP LT 130 MM HG: CPT | Performed by: INTERNAL MEDICINE

## 2024-10-12 NOTE — PROGRESS NOTES
Subjective   Patient ID: Charles Chan is a 79 y.o. male who presents for Follow-up.    Charles today came here for multiple medical issues.  Overall, he is a happy person.  Feeling much better.  Blood pressure okay.  He went to see Dr. Willis.  He reduced the dose of clonidine.   It is okay.  He keeps blood pressure monitor at home.  Things are better.  Legs are healing very well.  He saw Vascular Surgery.  He wants a flu shot.  He gets mild cold.    I have personally reviewed the patient's Past Medical History, Medications, Allergies, Social History, and Family History in the EMR.    Review of Systems   All other systems reviewed and are negative.    Objective   /56   Ht 1.829 m (6')   Wt 107 kg (235 lb)   BMI 31.87 kg/m²     Physical Exam  Vitals reviewed.   Cardiovascular:      Heart sounds: Normal heart sounds, S1 normal and S2 normal. No murmur heard.     No friction rub.      Comments: Legs orthostasis changes, skin intact.  No sign of acute infection.  Pulmonary:      Effort: Pulmonary effort is normal.      Breath sounds: Normal breath sounds and air entry.   Abdominal:      Palpations: There is no hepatomegaly, splenomegaly or mass.   Musculoskeletal:      Right lower leg: No edema.      Left lower leg: No edema.   Lymphadenopathy:      Lower Body: No right inguinal adenopathy. No left inguinal adenopathy.   Neurological:      Cranial Nerves: Cranial nerves 2-12 are intact.      Sensory: No sensory deficit.      Motor: Motor function is intact.      Deep Tendon Reflexes: Reflexes are normal and symmetric.     LAB WORK:  Laboratory testing discussed.    Assessment/Plan   Problem List Items Addressed This Visit             ICD-10-CM       Cardiac and Vasculature    Hypertension I10    Relevant Orders    CBC    Comprehensive Metabolic Panel    Magnesium    Hypercholesterolemia E78.00     Other Visit Diagnoses         Codes    Venous stasis dermatitis, unspecified laterality    -  Primary I87.2     Immunization due     Z23    Relevant Orders    Flu vaccine, trivalent, preservative free, HIGH-DOSE, age 65y+ (Fluzone)    Chronic renal failure, stage 3 (moderate), unspecified whether stage 3a or 3b CKD (Multi)     N18.30    Anemia of chronic renal failure, stage 3 (moderate), unspecified whether stage 3a or 3b CKD (Multi)     N18.30, D63.1    Overweight     E66.3    Viral URI     J06.9        1. Stasis dermatitis, much better, doing okay.  No need for antibiotic.  2. Chronic renal failure, stage 3 to 4, stable.  3. Hypertension.  He is managed his hydrochlorothiazide, amlodipine and clonidine 0.1 mg twice a day. Dr. Willis reduced the dose, but I told him if systolic high he can take extra pill.  No problem.  Educated him.  4. Anemia of renal origin, chronic disease.  Monitor.  5. Cholesterol, stable.  6. Overweight.  Diet and exercise.  7. Flu shot given.  8. Viral URI, benign looking.  9. I shall see him back in a month.  10. Continue to follow.    Scribe Attestation  By signing my name below, I, Gin Salazar attest that this documentation has been prepared under the direction and in the presence of Moi Pryor MD.

## 2024-10-14 LAB
AORTIC VALVE PEAK VELOCITY: 1.44 M/S
AV PEAK GRADIENT: 8.3 MMHG
AVA (PEAK VEL): 2.84 CM2
EJECTION FRACTION APICAL 4 CHAMBER: 51.5
EJECTION FRACTION: 63 %
LEFT ATRIUM VOLUME AREA LENGTH INDEX BSA: 29.6 ML/M2
LEFT VENTRICLE INTERNAL DIMENSION DIASTOLE: 3.93 CM (ref 3.5–6)
LEFT VENTRICULAR OUTFLOW TRACT DIAMETER: 2.09 CM
MITRAL VALVE E/A RATIO: 0.72
RIGHT VENTRICLE FREE WALL PEAK S': 11.39 CM/S
RIGHT VENTRICLE PEAK SYSTOLIC PRESSURE: 30.6 MMHG
TRICUSPID ANNULAR PLANE SYSTOLIC EXCURSION: 1.5 CM

## 2024-10-14 RX ORDER — ROSUVASTATIN CALCIUM 40 MG/1
40 TABLET, COATED ORAL DAILY
Qty: 90 TABLET | Refills: 1 | Status: SHIPPED | OUTPATIENT
Start: 2024-10-14

## 2024-10-23 ENCOUNTER — APPOINTMENT (OUTPATIENT)
Dept: CARDIOLOGY | Facility: HOSPITAL | Age: 79
End: 2024-10-23
Payer: MEDICARE

## 2024-10-25 DIAGNOSIS — K21.9 GASTROESOPHAGEAL REFLUX DISEASE, UNSPECIFIED WHETHER ESOPHAGITIS PRESENT: ICD-10-CM

## 2024-10-25 RX ORDER — PANTOPRAZOLE SODIUM 40 MG/1
40 TABLET, DELAYED RELEASE ORAL 2 TIMES DAILY
Qty: 60 TABLET | Refills: 1 | Status: SHIPPED | OUTPATIENT
Start: 2024-10-25 | End: 2024-12-24

## 2024-11-06 ENCOUNTER — APPOINTMENT (OUTPATIENT)
Dept: CARDIOLOGY | Facility: HOSPITAL | Age: 79
End: 2024-11-06
Payer: MEDICARE

## 2024-11-18 ENCOUNTER — LAB (OUTPATIENT)
Dept: LAB | Facility: LAB | Age: 79
End: 2024-11-18
Payer: MEDICARE

## 2024-11-18 DIAGNOSIS — I10 HYPERTENSION, UNSPECIFIED TYPE: ICD-10-CM

## 2024-11-18 LAB
ALBUMIN SERPL BCP-MCNC: 3.8 G/DL (ref 3.4–5)
ALP SERPL-CCNC: 69 U/L (ref 33–136)
ALT SERPL W P-5'-P-CCNC: 12 U/L (ref 10–52)
ANION GAP SERPL CALC-SCNC: 10 MMOL/L (ref 10–20)
AST SERPL W P-5'-P-CCNC: 14 U/L (ref 9–39)
BILIRUB SERPL-MCNC: 0.6 MG/DL (ref 0–1.2)
BUN SERPL-MCNC: 34 MG/DL (ref 6–23)
CALCIUM SERPL-MCNC: 9.1 MG/DL (ref 8.6–10.3)
CHLORIDE SERPL-SCNC: 107 MMOL/L (ref 98–107)
CO2 SERPL-SCNC: 26 MMOL/L (ref 21–32)
CREAT SERPL-MCNC: 2.51 MG/DL (ref 0.5–1.3)
EGFRCR SERPLBLD CKD-EPI 2021: 25 ML/MIN/1.73M*2
ERYTHROCYTE [DISTWIDTH] IN BLOOD BY AUTOMATED COUNT: 12.7 % (ref 11.5–14.5)
GLUCOSE SERPL-MCNC: 99 MG/DL (ref 74–99)
HCT VFR BLD AUTO: 35.9 % (ref 41–52)
HGB BLD-MCNC: 11.5 G/DL (ref 13.5–17.5)
MAGNESIUM SERPL-MCNC: 1.97 MG/DL (ref 1.6–2.4)
MCH RBC QN AUTO: 27.8 PG (ref 26–34)
MCHC RBC AUTO-ENTMCNC: 32 G/DL (ref 32–36)
MCV RBC AUTO: 87 FL (ref 80–100)
NRBC BLD-RTO: 0 /100 WBCS (ref 0–0)
PLATELET # BLD AUTO: 238 X10*3/UL (ref 150–450)
POTASSIUM SERPL-SCNC: 4.2 MMOL/L (ref 3.5–5.3)
PROT SERPL-MCNC: 6.5 G/DL (ref 6.4–8.2)
RBC # BLD AUTO: 4.14 X10*6/UL (ref 4.5–5.9)
SODIUM SERPL-SCNC: 139 MMOL/L (ref 136–145)
WBC # BLD AUTO: 9.5 X10*3/UL (ref 4.4–11.3)

## 2024-11-18 PROCEDURE — 80053 COMPREHEN METABOLIC PANEL: CPT

## 2024-11-18 PROCEDURE — 36415 COLL VENOUS BLD VENIPUNCTURE: CPT

## 2024-11-18 PROCEDURE — 85027 COMPLETE CBC AUTOMATED: CPT

## 2024-11-18 PROCEDURE — 83735 ASSAY OF MAGNESIUM: CPT

## 2024-11-29 ENCOUNTER — APPOINTMENT (OUTPATIENT)
Dept: PRIMARY CARE | Facility: CLINIC | Age: 79
End: 2024-11-29
Payer: MEDICARE

## 2024-12-19 ENCOUNTER — APPOINTMENT (OUTPATIENT)
Dept: PRIMARY CARE | Facility: CLINIC | Age: 79
End: 2024-12-19
Payer: MEDICARE

## 2024-12-19 VITALS
SYSTOLIC BLOOD PRESSURE: 148 MMHG | DIASTOLIC BLOOD PRESSURE: 78 MMHG | HEIGHT: 72 IN | WEIGHT: 240 LBS | BODY MASS INDEX: 32.51 KG/M2

## 2024-12-19 DIAGNOSIS — N28.9 RENAL INSUFFICIENCY: ICD-10-CM

## 2024-12-19 DIAGNOSIS — E53.8 B12 DEFICIENCY: ICD-10-CM

## 2024-12-19 DIAGNOSIS — E78.00 HYPERCHOLESTEROLEMIA: ICD-10-CM

## 2024-12-19 DIAGNOSIS — N40.0 BENIGN PROSTATIC HYPERPLASIA, UNSPECIFIED WHETHER LOWER URINARY TRACT SYMPTOMS PRESENT: Primary | ICD-10-CM

## 2024-12-19 DIAGNOSIS — I10 PRIMARY HYPERTENSION: ICD-10-CM

## 2024-12-19 PROCEDURE — 1159F MED LIST DOCD IN RCRD: CPT | Performed by: INTERNAL MEDICINE

## 2024-12-19 PROCEDURE — 3078F DIAST BP <80 MM HG: CPT | Performed by: INTERNAL MEDICINE

## 2024-12-19 PROCEDURE — 1123F ACP DISCUSS/DSCN MKR DOCD: CPT | Performed by: INTERNAL MEDICINE

## 2024-12-19 PROCEDURE — 3077F SYST BP >= 140 MM HG: CPT | Performed by: INTERNAL MEDICINE

## 2024-12-19 PROCEDURE — 99213 OFFICE O/P EST LOW 20 MIN: CPT | Performed by: INTERNAL MEDICINE

## 2024-12-20 NOTE — PROGRESS NOTES
Subjective   Patient ID: Charles Chan is a 79 y.o. male who presents for Follow-up.    It is always a delight to serve Mr. Chan.  Today he came with his wife.  Overall, he is feeling okay.  Kidney okay.  No problem.  Appetite and weight are okay.  No problem.  Taking medications regularly.  He came for follow-up appointment.  Overall, he is keeping well at the moment.  He came for follow-up on various conditions.    I have personally reviewed the patient's Past Medical History, Medications, Allergies, Social History, and Family History in the EMR.    Review of Systems   All other systems reviewed and are negative.    Objective   /78   Ht 1.829 m (6')   Wt 109 kg (240 lb)   BMI 32.55 kg/m²     Physical Exam  Vitals reviewed.   Cardiovascular:      Heart sounds: Normal heart sounds, S1 normal and S2 normal. No murmur heard.     No friction rub.   Pulmonary:      Effort: Pulmonary effort is normal.      Breath sounds: Normal breath sounds and air entry.   Abdominal:      Palpations: There is no hepatomegaly, splenomegaly or mass.   Musculoskeletal:      Right lower leg: No edema.      Left lower leg: No edema.   Lymphadenopathy:      Lower Body: No right inguinal adenopathy. No left inguinal adenopathy.   Neurological:      Cranial Nerves: Cranial nerves 2-12 are intact.      Sensory: No sensory deficit.      Motor: Motor function is intact.      Deep Tendon Reflexes: Reflexes are normal and symmetric.     LAB WORK: Laboratory testing discussed.    Assessment/Plan   Problem List Items Addressed This Visit             ICD-10-CM       Cardiac and Vasculature    Hypertension I10    Relevant Orders    CBC    Magnesium    Hypercholesterolemia E78.00    Relevant Orders    Comprehensive Metabolic Panel     Other Visit Diagnoses         Codes    Benign prostatic hyperplasia, unspecified whether lower urinary tract symptoms present    -  Primary N40.0    Renal insufficiency     N28.9    B12 deficiency     E53.8         1. Hypertension, okay.  2. High cholesterol, stable.  3. BPH, okay.  4. Renal insufficiency.  Keeping an eye on it.  5. B12 low.  Monitor.  6. Follow-up appointment in six weeks.  Today’s blood work we will communicate over the phone.    Jarenibe Attestation  By signing my name below, ILeonie Scribe attest that this documentation has been prepared under the direction and in the presence of Moi Pryor MD.     All medical record entries made by the jarenibe were personally dictated by me I have reviewed the chart and agree the record accurately reflects my personal performance of his history physical examination and management

## 2025-01-20 ENCOUNTER — APPOINTMENT (OUTPATIENT)
Dept: CARDIOLOGY | Facility: CLINIC | Age: 80
End: 2025-01-20
Payer: MEDICARE

## 2025-01-27 NOTE — ANESTHESIA PROCEDURE NOTES
Arterial Line:    Date/Time: 6/21/2024 9:11 AM    Staffing  Performed: [facility]   Authorized by: Charles Walker MD    Performed by: Charles Walker MD    An arterial line was placed. Procedure performed using surface landmarks.in the OR for the following indication(s): continuous blood pressure monitoring.    A 20 gauge (size), 5 cm (length), Angiocath (type) catheter was placed into the Left radial artery, secured by tape,   Seldinger technique used.  Events:  patient tolerated procedure well with no complications.             Cardiac

## 2025-02-12 ENCOUNTER — OFFICE VISIT (OUTPATIENT)
Dept: CARDIOLOGY | Facility: HOSPITAL | Age: 80
End: 2025-02-12
Payer: MEDICARE

## 2025-02-12 VITALS
BODY MASS INDEX: 33.16 KG/M2 | HEART RATE: 65 BPM | OXYGEN SATURATION: 94 % | SYSTOLIC BLOOD PRESSURE: 129 MMHG | DIASTOLIC BLOOD PRESSURE: 67 MMHG | WEIGHT: 244.49 LBS

## 2025-02-12 DIAGNOSIS — I87.2 VENOUS INSUFFICIENCY OF BOTH LOWER EXTREMITIES: Primary | ICD-10-CM

## 2025-02-12 PROCEDURE — 1123F ACP DISCUSS/DSCN MKR DOCD: CPT | Performed by: INTERNAL MEDICINE

## 2025-02-12 PROCEDURE — 1159F MED LIST DOCD IN RCRD: CPT | Performed by: INTERNAL MEDICINE

## 2025-02-12 PROCEDURE — 3074F SYST BP LT 130 MM HG: CPT | Performed by: INTERNAL MEDICINE

## 2025-02-12 PROCEDURE — 1160F RVW MEDS BY RX/DR IN RCRD: CPT | Performed by: INTERNAL MEDICINE

## 2025-02-12 PROCEDURE — 99213 OFFICE O/P EST LOW 20 MIN: CPT | Performed by: INTERNAL MEDICINE

## 2025-02-12 PROCEDURE — 1036F TOBACCO NON-USER: CPT | Performed by: INTERNAL MEDICINE

## 2025-02-12 PROCEDURE — 3078F DIAST BP <80 MM HG: CPT | Performed by: INTERNAL MEDICINE

## 2025-02-12 NOTE — PATIENT INSTRUCTIONS
I would recommend getting a doffing device (Medi Mai Off) that looks like a giant shoe horn. [facility] ought to be able to order one for you.    I want you to elevate your legs.    I want you to wear the compression socks.    I want you to LIE DOWN when you take a nap.    I want you to get more physically active.    Should you have questions, please do not hesitate to call the office at [phone], or you can reach me on [employer] if you have signed up for it. If you have urgent concerns, call the office, do not use [employer].

## 2025-02-12 NOTE — PROGRESS NOTES
History of Present Illness:  Charles Chan is a/an 79 y.o. man with prior left leg cellulitis referred for chronic leg swelling that is improved with compression socks, although he has not been wearing them due to difficulty with donning (can't reach the floor due to spine issues; not flexible enough to cross his legs).     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 Legs are more swollen at the end of the day and better first thing in the morning. He has visible varicose veins that have been present for years.      He was last seen on 7/22/2024, at which time I recommended getting a donning device to assist with putting his compression socks on and doing pool exercise, as well as elevating legs as much as possible. He hasn't really been doing any of those things.      Past Medical History:   Diagnosis Date    AAA (abdominal aortic aneurysm) (CMS-[facility])     Anemia     Arthritis     BPH (benign prostatic hyperplasia)     Carotid stenosis     Cellulitis     left leg    Chronic renal disease, stage 2, mildly decreased glomerular filtration rate (GFR) between 60-89 mL/min/1.73 square meter     CKD (chronic kidney disease), stage III (Multi)     Coronary artery disease     Environmental allergies     GERD (gastroesophageal reflux disease)     High cholesterol     HTN (hypertension)     Hyperlipemia     Hypertension     Hypothyroidism     Overweight     Personal history of other diseases of the digestive system 10/27/2021    History of gastroesophageal reflux (GERD)    Prediabetes     Venous insufficiency of both lower extremities     Venous stasis dermatitis of both lower extremities      Past Surgical History:   Procedure Laterality Date    APPENDECTOMY  06/13/2013    Appendectomy    CARDIAC CATHETERIZATION  2008    2 stents placed     COLON SURGERY  2013    partial colectomy for diverticular disease    CORONARY ANGIOPLASTY      GALLBLADDER SURGERY  2013    Gallbladder Surgery    VASCULAR SURGERY Right 2024    carotid endardectomy     Social History     Tobacco Use    Smoking status: Former     Current packs/day: 0.00     Average packs/day: 1 pack/day for 30.0 years (30.0 ttl pk-yrs)     Types: Cigarettes     Start date:      Quit date:      Years since quittin.1    Smokeless tobacco: Never   Vaping Use    Vaping status: Never Used   Substance Use Topics    Alcohol use: Never    Drug use: Never     Family History   Problem Relation Name Age of Onset    Hypertension Mother      Heart disease Mother      Stroke Father      Diabetes Other      Hypertension Other       Current Outpatient Medications   Medication Sig Dispense Refill    alfuzosin (Uroxatral) 10 mg 24 hr tablet TAKE 1 TABLET BY MOUTH EVERY DAY 90 tablet 0    amLODIPine (Norvasc) 10 mg tablet Take 1 tablet (10 mg) by mouth once daily. 90 tablet 3    ammonium lactate (Lac-Hydrin) 12 % lotion Apply and rub in a thin film to affected areas twice daily (am and pm)      aspirin 325 mg tablet Take 1 tablet (325 mg) by mouth once daily.      bacitracin 500 unit/gram ointment Apply topically 2 times a day. (Patient not taking: Reported on 10/11/2024) 14 g 0    cholecalciferol (Vitamin D-3) 5,000 Units tablet TAKE 1 TABLET BY MOUTH EVERY DAY 90 tablet 0    cloNIDine (Catapres) 0.1 mg tablet Take 1 tablet (0.1 mg) by mouth 2 times a day. 180 tablet 3    clopidogrel (Plavix) 75 mg tablet TAKE 1 TABLET BY MOUTH EVERY DAY 90 tablet 0    cyanocobalamin, vitamin B-12, (Vitamin B-12) 1,000 mcg tablet extended release Take 1 tablet (1,000 mcg) by mouth once daily. As directed      econazole nitrate 1 % cream APPLY TO AFFECTED AREA EVERY DAY AS DIRECTED *30 DAY SUPPLY* 85 g 0    famotidine (Pepcid) 20 mg tablet TAKE 1 TABLET BY MOUTH TWICE A  tablet 0    finasteride  (Proscar) 5 mg tablet TAKE 1 TABLET BY MOUTH EVERY DAY 90 tablet 0    hydroCHLOROthiazide (HYDRODiuril) 25 mg tablet TAKE 1 TABLET BY MOUTH EVERY DAY 90 tablet 0    Klor-Con M20 20 mEq ER tablet TAKE 1 TABLET BY MOUTH ONCE DAILY. DO NOT CRUSH OR CHEW. 90 tablet 0    metoprolol succinate XL (Toprol-XL) 25 mg 24 hr tablet TAKE 1 TABLET BY MOUTH TWICE A DAY. DO NOT CRUSH OR CHEW. 180 tablet 0    pantoprazole (ProtoNix) 40 mg EC tablet TAKE 1 TABLET BY MOUTH TWICE A DAY. DO NOT CRUSH, CHEW OR SPLIT 180 tablet 0    rosuvastatin (Crestor) 40 mg tablet TAKE 1 TABLET BY MOUTH EVERY DAY 90 tablet 1    salmeterol (Serevent Diskus) 50 mcg/dose diskus inhaler Inhale 1 puff 2 times a day. 1 each 0    triamcinolone (Kenalog) 0.1 % cream Apply topically 2 times a day. to affected area (Patient not taking: Reported on 10/11/2024) 80 g 0     No current facility-administered medications for this visit.       Physical Examination:  Blood pressure 129/67, pulse 65, weight 111 kg (244 lb 7.8 oz), SpO2 94%.  No distress  Bilateral leg swelling left worse than right   Bilateral hemosiderin staining  Spider telangiectasias     Pertinent Labs:    Pertinent Imaging:    Diagnoses and all orders for this visit:  Venous insufficiency of both lower extremities (Primary)  -     General supply request Mediven Mai Off doffing device, dispense one  I would recommend getting a doffing device (Medi Mai Off) that looks like a giant shoe horn. [facility] ought to be able to order one for you.    I want you to elevate your legs.    I want you to wear the compression socks.    I want you to LIE DOWN when you take a nap.    I want you to get more physically active.    Should you have questions, please do not hesitate to call the office at [phone], or you can reach me on [employer] if you have signed up for it. If you have urgent concerns, call the office, do not use [employer].      Jossy Silver MD, MS

## 2025-02-26 ENCOUNTER — OFFICE VISIT (OUTPATIENT)
Dept: VASCULAR SURGERY | Facility: CLINIC | Age: 80
End: 2025-02-26
Payer: MEDICARE

## 2025-02-26 ENCOUNTER — HOSPITAL ENCOUNTER (OUTPATIENT)
Dept: VASCULAR MEDICINE | Facility: CLINIC | Age: 80
Discharge: HOME | End: 2025-02-26
Payer: MEDICARE

## 2025-02-26 VITALS — DIASTOLIC BLOOD PRESSURE: 79 MMHG | HEART RATE: 58 BPM | SYSTOLIC BLOOD PRESSURE: 126 MMHG

## 2025-02-26 DIAGNOSIS — Z98.890 H/O ENDARTERECTOMY: ICD-10-CM

## 2025-02-26 DIAGNOSIS — Z98.890 H/O CAROTID ENDARTERECTOMY: ICD-10-CM

## 2025-02-26 DIAGNOSIS — I65.23 CAROTID STENOSIS, BILATERAL: ICD-10-CM

## 2025-02-26 DIAGNOSIS — R26.89 BALANCE PROBLEM: ICD-10-CM

## 2025-02-26 DIAGNOSIS — I71.43 INFRARENAL ABDOMINAL AORTIC ANEURYSM (AAA) WITHOUT RUPTURE (CMS-HCC): Primary | ICD-10-CM

## 2025-02-26 DIAGNOSIS — Z86.79 HISTORY OF AORTIC DISSECTION: ICD-10-CM

## 2025-02-26 DIAGNOSIS — I65.23 BILATERAL CAROTID ARTERY STENOSIS: ICD-10-CM

## 2025-02-26 PROCEDURE — 1036F TOBACCO NON-USER: CPT | Performed by: NURSE PRACTITIONER

## 2025-02-26 PROCEDURE — 1159F MED LIST DOCD IN RCRD: CPT | Performed by: NURSE PRACTITIONER

## 2025-02-26 PROCEDURE — 99213 OFFICE O/P EST LOW 20 MIN: CPT | Performed by: NURSE PRACTITIONER

## 2025-02-26 PROCEDURE — 93880 EXTRACRANIAL BILAT STUDY: CPT

## 2025-02-26 PROCEDURE — 1160F RVW MEDS BY RX/DR IN RCRD: CPT | Performed by: NURSE PRACTITIONER

## 2025-02-26 PROCEDURE — 93880 EXTRACRANIAL BILAT STUDY: CPT | Performed by: SURGERY

## 2025-02-26 PROCEDURE — 3078F DIAST BP <80 MM HG: CPT | Performed by: NURSE PRACTITIONER

## 2025-02-26 PROCEDURE — 3074F SYST BP LT 130 MM HG: CPT | Performed by: NURSE PRACTITIONER

## 2025-02-26 PROCEDURE — 1123F ACP DISCUSS/DSCN MKR DOCD: CPT | Performed by: NURSE PRACTITIONER

## 2025-02-26 ASSESSMENT — ENCOUNTER SYMPTOMS
DEPRESSION: 0
OCCASIONAL FEELINGS OF UNSTEADINESS: 1
LOSS OF SENSATION IN FEET: 0

## 2025-02-26 NOTE — PROGRESS NOTES
History Of Present Illness  Charles Chan is a 79 y.o. male presenting with a history of a right carotid endarterectomy in June 2024.  Patient returns to the office today for routine surveillance duplex and follow-up office visit.  He denies any symptoms of stroke, TIA, or amaurosis fugax..  Patient taking aspirin and statin.  Additionally, patient has a known infrarenal abdominal aortic aneurysm that has not been evaluated since MR abdomen was completed in April 2024.  It showed a 4.2 cm abdominal aortic aneurysm with chronic dissection.  Denies any abdominal pain or back pain related to the aorta.     Past Medical History  He has a past medical history of AAA (abdominal aortic aneurysm) (CMS-[facility]), Anemia, Arthritis, BPH (benign prostatic hyperplasia), Carotid stenosis, Cellulitis, Chronic renal disease, stage 2, mildly decreased glomerular filtration rate (GFR) between 60-89 mL/min/1.73 square meter, CKD (chronic kidney disease), stage III (Multi), Coronary artery disease, Environmental allergies, GERD (gastroesophageal reflux disease), High cholesterol, HTN (hypertension), Hyperlipemia, Hypertension, Hypothyroidism, Overweight, Personal history of other diseases of the digestive system (10/27/2021), Prediabetes, Venous insufficiency of both lower extremities, and Venous stasis dermatitis of both lower extremities.    Surgical History  He has a past surgical history that includes Gallbladder surgery (06/13/2013); Colon surgery (06/13/2013); Appendectomy (06/13/2013); Coronary angioplasty; Cardiac catheterization (2008); and Vascular surgery (Right, 06/21/2024).     Social History  He reports that he quit smoking about 32 years ago. His smoking use included cigarettes. He started smoking about 62 years ago. He has a 30 pack-year smoking history. He has never used smokeless tobacco. He reports that he does not drink alcohol and does not use drugs.    Family History  Family History   Problem Relation Name Age of  Onset    Hypertension Mother      Heart disease Mother      Stroke Father      Diabetes Other      Hypertension Other          Allergies  Patient has no known allergies.    Review of Systems    CONSTITUTIONAL: Denies weight loss, fever and chills.     HEENT: Denies changes in vision and hearing.     RESPIRATORY: Denies SOB and cough.     CV: Denies palpitations and CP.     GI: Denies abdominal pain, nausea, vomiting and diarrhea.     : Denies dysuria and urinary frequency.     MSK: Denies myalgia and joint pain.     SKIN: Denies rash and pruritus.     VASC: Denies claudication, ischemic rest pain, or open wounds or sores     NEUROLOGICAL: Denies headache and syncope.      PSYCHIATRIC: Denies recent changes in mood. Denies anxiety and depression.     Physical Exam    General: Pt is alert and oriented x 3. Pleasant, conversive  HEENT: Head is atraumatic, normocephalic.  Neck incision well-healed.  Cardiac: Normal S1-S2.  Regular rate and rhythm.  No murmurs.  Respiratory: Lungs clear to auscultation.  No adventitious sounds.  Abdomen: Soft, nondistended, nontender.  Bowel sounds x4 quadrants.  Pulse exam: Palpable brachial and radial pulses bilaterall.  Lower extremities are warm and well-perfused.  Extremities: No significant edema noted.  Extremities are warm to the touch and normal in color.  No open wounds or sores.  Neuro: Moves all extremities spontaneously.  No focal deficits.  Psych: Appropriate affect.  Answers questions appropriately     Last Recorded Vitals  /79 (BP Location: Left arm, Patient Position: Sitting)   Pulse 58     Relevant Results    Current Outpatient Medications   Medication Instructions    alfuzosin (Uroxatral) 10 mg 24 hr tablet TAKE 1 TABLET BY MOUTH EVERY DAY    amLODIPine (NORVASC) 10 mg, oral, Daily    ammonium lactate (Lac-Hydrin) 12 % lotion Apply and rub in a thin film to affected areas twice daily (am and pm)    aspirin 325 mg, Daily    bacitracin 500 unit/gram ointment  Topical, 2 times daily    cholecalciferol (VITAMIN D-3) 5,000 Units, oral, Daily    cloNIDine (CATAPRES) 0.1 mg, oral, 2 times daily    clopidogrel (Plavix) 75 mg tablet TAKE 1 TABLET BY MOUTH EVERY DAY    cyanocobalamin (vitamin B-12) (VITAMIN B-12) 1,000 mcg, Daily    econazole nitrate 1 % cream APPLY TO AFFECTED AREA EVERY DAY AS DIRECTED *30 DAY SUPPLY*    famotidine (Pepcid) 20 mg tablet TAKE 1 TABLET BY MOUTH TWICE A DAY    finasteride (Proscar) 5 mg tablet TAKE 1 TABLET BY MOUTH EVERY DAY    hydroCHLOROthiazide (HYDRODIURIL) 25 mg, oral, Daily    Klor-Con M20 20 mEq ER tablet 20 mEq, oral, Daily, DO NOT CRUSH OR CHEW    pantoprazole (ProtoNix) 40 mg EC tablet TAKE 1 TABLET BY MOUTH TWICE A DAY. DO NOT CRUSH, CHEW OR SPLIT    rosuvastatin (CRESTOR) 40 mg, oral, Daily    salmeterol (Serevent Diskus) 50 mcg/dose diskus inhaler 1 puff, inhalation, 2 times daily RT    triamcinolone (Kenalog) 0.1 % cream Topical, 2 times daily, to affected area           Assessment/Plan   History of carotid endarterectomy  Carotid stenosis  Abdominal aortic aneurysm  Chronic aortic dissection    I reviewed the results of the patient's carotid artery duplex completed in the office today.  Bilateral ICA show less than 50% stenosis.  However, there was a abnormal finding in the right common carotid artery.  There appears to be an intimal flap, possible chronic dissection.  Patient's renal function precludes the use of CT angiogram.  I will send a secure message to Dr. Botello to figure out next steps.  I told the patient to call me if he does not hear from me within 1 week.  Continue aspirin, Plavix, statin.    Additionally, the patient has not had his abdominal aortic aneurysm evaluated in approximately 11 months.  Recommend aortic duplex for further evaluation.       Aj Ivy, APRN-CNP

## 2025-02-26 NOTE — PATIENT INSTRUCTIONS
Charles,    It was good to see you again!  Thank you for choosing  vascular surgery for your care.    We discussed the results of your carotid ultrasound which shows that the right sided internal carotid artery(surgical site) artery is widely patent and working well.  However, there was a questionable dissection of the common carotid artery on the right.  I will discuss with Dr. Botello and follow-up with you.  If you do not hear from me within 1 week, call me at [phone] (press option 2).    Also, you have an abdominal aortic aneurysm which needs to be evaluated with an ultrasound.  Please schedule that appointment at your convenience in the office.

## 2025-02-27 ENCOUNTER — DOCUMENTATION (OUTPATIENT)
Dept: VASCULAR SURGERY | Facility: HOSPITAL | Age: 80
End: 2025-02-27
Payer: MEDICARE

## 2025-02-27 DIAGNOSIS — Z98.890 H/O ENDARTERECTOMY: Primary | ICD-10-CM

## 2025-02-27 DIAGNOSIS — I65.23 CAROTID STENOSIS, BILATERAL: ICD-10-CM

## 2025-02-27 NOTE — PROGRESS NOTES
Called and left message regarding patient's abnormal ultrasound finding.  He has a history of a right carotid endarterectomy and new finding of right common carotid dissection flap.  Discussed in detail with Dr. Botello who also reviewed the images.  Continue dual antiplatelet therapy and follow-up again in 6 months.

## 2025-02-27 NOTE — ADDENDUM NOTE
Addended by: MARY ANNE DARLING on: 2/27/2025 10:46 AM     Modules accepted: Orders, Level of Service

## 2025-03-21 ENCOUNTER — OFFICE VISIT (OUTPATIENT)
Dept: CARDIOLOGY | Facility: CLINIC | Age: 80
End: 2025-03-21
Payer: MEDICARE

## 2025-03-21 VITALS
DIASTOLIC BLOOD PRESSURE: 62 MMHG | SYSTOLIC BLOOD PRESSURE: 122 MMHG | HEART RATE: 78 BPM | WEIGHT: 244 LBS | BODY MASS INDEX: 33.09 KG/M2 | OXYGEN SATURATION: 96 %

## 2025-03-21 DIAGNOSIS — I25.10 CORONARY ARTERY DISEASE INVOLVING NATIVE CORONARY ARTERY OF NATIVE HEART WITHOUT ANGINA PECTORIS: Primary | ICD-10-CM

## 2025-03-21 DIAGNOSIS — R93.89 ABNORMAL FINDINGS ON DIAGNOSTIC IMAGING OF OTHER SPECIFIED BODY STRUCTURES: ICD-10-CM

## 2025-03-21 DIAGNOSIS — R79.9 ABNORMAL FINDING OF BLOOD CHEMISTRY, UNSPECIFIED: ICD-10-CM

## 2025-03-21 PROCEDURE — 1160F RVW MEDS BY RX/DR IN RCRD: CPT | Performed by: NURSE PRACTITIONER

## 2025-03-21 PROCEDURE — 3078F DIAST BP <80 MM HG: CPT | Performed by: NURSE PRACTITIONER

## 2025-03-21 PROCEDURE — 3074F SYST BP LT 130 MM HG: CPT | Performed by: NURSE PRACTITIONER

## 2025-03-21 PROCEDURE — 1126F AMNT PAIN NOTED NONE PRSNT: CPT | Performed by: NURSE PRACTITIONER

## 2025-03-21 PROCEDURE — 1159F MED LIST DOCD IN RCRD: CPT | Performed by: NURSE PRACTITIONER

## 2025-03-21 PROCEDURE — 1036F TOBACCO NON-USER: CPT | Performed by: NURSE PRACTITIONER

## 2025-03-21 PROCEDURE — 99214 OFFICE O/P EST MOD 30 MIN: CPT | Performed by: NURSE PRACTITIONER

## 2025-03-21 PROCEDURE — 1123F ACP DISCUSS/DSCN MKR DOCD: CPT | Performed by: NURSE PRACTITIONER

## 2025-03-21 PROCEDURE — G2211 COMPLEX E/M VISIT ADD ON: HCPCS | Performed by: NURSE PRACTITIONER

## 2025-03-21 ASSESSMENT — ENCOUNTER SYMPTOMS
CONSTITUTIONAL NEGATIVE: 1
GASTROINTESTINAL NEGATIVE: 1
DEPRESSION: 0
LOSS OF SENSATION IN FEET: 0
NEUROLOGICAL NEGATIVE: 1
OCCASIONAL FEELINGS OF UNSTEADINESS: 0
MUSCULOSKELETAL NEGATIVE: 1
CARDIOVASCULAR NEGATIVE: 1
RESPIRATORY NEGATIVE: 1

## 2025-03-21 ASSESSMENT — PAIN SCALES - GENERAL: PAINLEVEL_OUTOF10: 0-NO PAIN

## 2025-03-21 NOTE — PROGRESS NOTES
Chief Complaint:   Hypertension, Coronary Artery Disease, and Hyperlipidemia (Pt denies c/o chest pain or SOB says he feels good.)    History Of Present Illness:    .Mr Chan returns in follow up with his wife.  Denies chest pain, sob, palpitations or pedal edema.  Labs.           Last Recorded Vitals:  Blood pressure 122/62, pulse 78, weight 111 kg (244 lb), SpO2 96%.     Past Medical History:  Past Medical History:   Diagnosis Date    AAA (abdominal aortic aneurysm) (CMS-[facility])     Anemia     Arthritis     BPH (benign prostatic hyperplasia)     Carotid stenosis     Cellulitis     left leg    Chronic renal disease, stage 2, mildly decreased glomerular filtration rate (GFR) between 60-89 mL/min/1.73 square meter     CKD (chronic kidney disease), stage III (Multi)     Coronary artery disease     Environmental allergies     GERD (gastroesophageal reflux disease)     High cholesterol     HTN (hypertension)     Hyperlipemia     Hypertension     Hypothyroidism     Overweight     Personal history of other diseases of the digestive system 10/27/2021    History of gastroesophageal reflux (GERD)    Prediabetes     Venous insufficiency of both lower extremities     Venous stasis dermatitis of both lower extremities         Past Surgical History:  Past Surgical History:   Procedure Laterality Date    APPENDECTOMY  06/13/2013    Appendectomy    CARDIAC CATHETERIZATION  2008    2 stents placed    COLON SURGERY  06/13/2013    partial colectomy for diverticular disease    CORONARY ANGIOPLASTY      GALLBLADDER SURGERY  06/13/2013    Gallbladder Surgery    VASCULAR SURGERY Right 06/21/2024    carotid endardectomy       Social History:  Social History     Socioeconomic History    Marital status:    Occupational History    Occupation: Retired   Tobacco Use    Smoking status: Former     Current packs/day: 0.00     Average packs/day: 1 pack/day for 30.0 years (30.0 ttl pk-yrs)     Types: Cigarettes     Start date: 1963     Quit  date:      Years since quittin.2    Smokeless tobacco: Never   Vaping Use    Vaping status: Never Used   Substance and Sexual Activity    Alcohol use: Never    Drug use: Never    Sexual activity: Defer     Social Drivers of Health     Financial Resource Strain: Low Risk  (2024)    Overall Financial Resource Strain (CARDIA)     Difficulty of Paying Living Expenses: Not hard at all   Food Insecurity: No Food Insecurity (2024)    Hunger Vital Sign     Worried About Running Out of Food in the Last Year: Never true     Ran Out of Food in the Last Year: Never true   Transportation Needs: No Transportation Needs (2024)    PRAPARE - Transportation     Lack of Transportation (Medical): No     Lack of Transportation (Non-Medical): No   Physical Activity: Sufficiently Active (2024)    Exercise Vital Sign     Days of Exercise per Week: 7 days     Minutes of Exercise per Session: 30 min   Stress: No Stress Concern Present (2024)    [employer] - Occupational Stress Questionnaire     Feeling of Stress : Only a little   Recent Concern: Stress - Stress Concern Present (2024)    [employer] - Occupational Stress Questionnaire     Feeling of Stress : To some extent   Social Connections: Moderately Isolated (2024)    Social Connection and Isolation Panel [NHANES]     Frequency of Communication with Friends and Family: More than three times a week     Frequency of Social Gatherings with Friends and Family: More than three times a week     Attends Jehovah's witness Services: Never     Active Member of Clubs or Organizations: No     Attends Club or Organization Meetings: Never     Marital Status:    Intimate Partner Violence: Not At Risk (2024)    Humiliation, Afraid, Rape, and Kick questionnaire     Fear of Current or Ex-Partner: No     Emotionally Abused: No     Physically Abused: No     Sexually Abused: No   Housing Stability: Low  Risk  (6/23/2024)    Housing Stability Vital Sign     Unable to Pay for Housing in the Last Year: No     Number of Places Lived in the Last Year: 1     Unstable Housing in the Last Year: No       Family History:  Family History   Problem Relation Name Age of Onset    Hypertension Mother      Heart disease Mother      Stroke Father      Diabetes Other      Hypertension Other           Allergies:  Patient has no known allergies.    Outpatient Medications:  Current Outpatient Medications   Medication Sig Dispense Refill    alfuzosin (Uroxatral) 10 mg 24 hr tablet TAKE 1 TABLET BY MOUTH EVERY DAY 90 tablet 0    amLODIPine (Norvasc) 10 mg tablet Take 1 tablet (10 mg) by mouth once daily. 90 tablet 3    ammonium lactate (Lac-Hydrin) 12 % lotion Apply and rub in a thin film to affected areas twice daily (am and pm)      aspirin 325 mg tablet Take 1 tablet (325 mg) by mouth once daily.      bacitracin 500 unit/gram ointment Apply topically 2 times a day. (Patient taking differently: Apply topically if needed.) 14 g 0    cholecalciferol (Vitamin D-3) 5,000 Units tablet TAKE 1 TABLET BY MOUTH EVERY DAY 90 tablet 0    cloNIDine (Catapres) 0.1 mg tablet Take 1 tablet (0.1 mg) by mouth 2 times a day. 180 tablet 3    clopidogrel (Plavix) 75 mg tablet TAKE 1 TABLET BY MOUTH EVERY DAY 90 tablet 0    cyanocobalamin, vitamin B-12, (Vitamin B-12) 1,000 mcg tablet extended release Take 1 tablet (1,000 mcg) by mouth once daily. As directed      famotidine (Pepcid) 20 mg tablet TAKE 1 TABLET BY MOUTH TWICE A  tablet 0    finasteride (Proscar) 5 mg tablet TAKE 1 TABLET BY MOUTH EVERY DAY 90 tablet 0    hydroCHLOROthiazide (HYDRODiuril) 25 mg tablet TAKE 1 TABLET BY MOUTH EVERY DAY 90 tablet 0    pantoprazole (ProtoNix) 40 mg EC tablet TAKE 1 TABLET BY MOUTH TWICE A DAY. DO NOT CRUSH, CHEW OR SPLIT 180 tablet 0    potassium chloride CR 20 mEq ER tablet TAKE 1 TABLET BY MOUTH ONCE DAILY. DO NOT CRUSH OR CHEW. 90 tablet 0     rosuvastatin (Crestor) 40 mg tablet TAKE 1 TABLET BY MOUTH EVERY DAY 90 tablet 1    triamcinolone (Kenalog) 0.1 % cream Apply topically 2 times a day. to affected area 80 g 0    econazole nitrate 1 % cream APPLY TO AFFECTED AREA EVERY DAY AS DIRECTED *30 DAY SUPPLY* (Patient not taking: Reported on 3/21/2025) 85 g 0    salmeterol (Serevent Diskus) 50 mcg/dose diskus inhaler Inhale 1 puff 2 times a day. (Patient not taking: Reported on 3/21/2025) 1 each 0     No current facility-administered medications for this visit.        Physical Exam:  Cardiovascular:      PMI at left midclavicular line. Normal rate. Regular rhythm. Normal S1. Normal S2.       Murmurs: There is no murmur.      No gallop.  No click. No rub.   Pulses:     Intact distal pulses.   Edema:     Peripheral edema absent.         ROS:  Review of Systems   Constitutional: Negative.   Cardiovascular: Negative.    Respiratory: Negative.     Skin: Negative.    Musculoskeletal: Negative.    Gastrointestinal: Negative.    Genitourinary: Negative.    Neurological: Negative.           Last Labs:  CBC -  Lab Results   Component Value Date    WBC 9.5 11/18/2024    HGB 11.5 (L) 11/18/2024    HCT 35.9 (L) 11/18/2024    MCV 87 11/18/2024     11/18/2024       CMP -  Lab Results   Component Value Date    CALCIUM 9.1 11/18/2024    PHOS 2.8 06/23/2024    PROT 6.5 11/18/2024    ALBUMIN 3.8 11/18/2024    AST 14 11/18/2024    ALT 12 11/18/2024    ALKPHOS 69 11/18/2024    BILITOT 0.6 11/18/2024       LIPID PANEL -   Lab Results   Component Value Date    CHOL 144 01/16/2024    TRIG 100 01/16/2024    HDL 46.1 01/16/2024    CHHDL 3.1 01/16/2024    LDLF 65 09/11/2023    VLDL 20 01/16/2024    NHDL 98 01/16/2024       RENAL FUNCTION PANEL -   Lab Results   Component Value Date    GLUCOSE 99 11/18/2024     11/18/2024    K 4.2 11/18/2024     11/18/2024    CO2 26 11/18/2024    ANIONGAP 10 11/18/2024    BUN 34 (H) 11/18/2024    CREATININE 2.51 (H) 11/18/2024     GFRMALE 36 (A) 09/11/2023    CALCIUM 9.1 11/18/2024    PHOS 2.8 06/23/2024    ALBUMIN 3.8 11/18/2024        Lab Results   Component Value Date    HGBA1C 5.4 01/16/2024         Assessment/Plan   Problem List Items Addressed This Visit    None    1. CAD with unstable angina May 2009.  2. CAD with PCI and drug eluting stent to the mid and distal LCX and   proximal RCA May 18, 2009. Please refer to prior office notes for   review.  3. Repeat cardiac catheterization February 8, 2010 with stents widely patent.  The patient is complaining of increasing shortness of breath particularly when climbing stairs as well as lightheadedness when standing up quickly.  As discussed below his heart rate is slow and blood pressure low related to medication.  His dose of metoprolol will be reduced and hydralazine discontinued.  He will return in 8 weeks for follow-up visit.  Patient currently not on metoprolol but on clonidine 0.1 mg 4 times daily with a resting heart rate of 50/min.  The dose of clonidine will be reduced to 0.1 mg twice daily.  Echocardiogram performed office visit 10/11/2024 demonstrates mild concentric LVH preserved LV ejection fraction 60 to 65% plus atrial septal aneurysm.  Patient feeling well with some chronic fatigue.  He seems to bows slightly sometimes when walking.  4. Hypertension.  The patient's blood pressure is well within normal range and he is relatively bradycardic.  EKG today 10/6/2023 demonstrates a sinus bradycardia at 38/min first-degree AV block MS interval 226 ms.  The patient's metoprolol tartrate will be reduced from 50 mg twice daily to 25 mg twice daily and subsequently changed to Toprol-XL 50 mg daily.  Patient will stop hydralazine 25 mg 3 times daily.  Return to office in 8 weeks.  Patient states that systolic blood pressures at home are in the 120-130 mmHg range.  Blood pressure slightly less than that during office visit 10/11/2024.  Patient denies any lightheadedness or presyncope.  Not  currently taking metoprolol.  He evidently had been taking clonidine 0.1 mg 4 times daily and it will be reduced to twice daily.  Patient remains on amlodipine 10 mg daily.  HCTZ and Klor-Con should be discontinued.      5. Renal insufficiency. Recent creatinine 1.88 when checked on 9/11/2023.  Lab work from 10/4/2024 includes a SMA panel creatinine of 2.66 potassium of 4.0.  The hematocrit is 35.1 presumed chronic anemia due to his CKD.      6. Remote former history of tobacco use with cessation since 1991.  7. Hyperlipidemia. Recent lipid panel showed a total cholesterol 122 LDL 65 HDL 43 triglycerides 69.  Labs were performed 9/11/2023.  8. Questionable left adrenal adenoma.  9. Mild DJD of the right shoulder. Patient will continue to follow up with Dr. Dick   10.BPH.  11. GERD  12.  Status post right carotid endarterectomy 6/21/2024.  Patient had follow-up carotid ultrasound on 02/2025 showing less than 50% stenoses bilaterally.  Follows with vascular.         Milly Kim, KATEY-CNP

## 2025-03-25 ENCOUNTER — TELEPHONE (OUTPATIENT)
Dept: CARDIOLOGY | Facility: CLINIC | Age: 80
End: 2025-03-25
Payer: MEDICARE

## 2025-03-25 LAB
ALBUMIN SERPL-MCNC: 3.8 G/DL (ref 3.6–5.1)
ALP SERPL-CCNC: 73 U/L (ref 35–144)
ALT SERPL-CCNC: 12 U/L (ref 9–46)
ANION GAP SERPL CALCULATED.4IONS-SCNC: 5 MMOL/L (CALC) (ref 7–17)
AST SERPL-CCNC: 15 U/L (ref 10–35)
BILIRUB SERPL-MCNC: 0.5 MG/DL (ref 0.2–1.2)
BUN SERPL-MCNC: 36 MG/DL (ref 7–25)
CALCIUM SERPL-MCNC: 8.9 MG/DL (ref 8.6–10.3)
CHLORIDE SERPL-SCNC: 110 MMOL/L (ref 98–110)
CHOLEST SERPL-MCNC: 120 MG/DL
CHOLEST/HDLC SERPL: 2.6 (CALC)
CO2 SERPL-SCNC: 26 MMOL/L (ref 20–32)
CREAT SERPL-MCNC: 2.42 MG/DL (ref 0.7–1.22)
EGFRCR SERPLBLD CKD-EPI 2021: 26 ML/MIN/1.73M2
ERYTHROCYTE [DISTWIDTH] IN BLOOD BY AUTOMATED COUNT: 12.4 % (ref 11–15)
EST. AVERAGE GLUCOSE BLD GHB EST-MCNC: 117 MG/DL
EST. AVERAGE GLUCOSE BLD GHB EST-SCNC: 6.5 MMOL/L
GLUCOSE SERPL-MCNC: 113 MG/DL (ref 65–99)
HBA1C MFR BLD: 5.7 % OF TOTAL HGB
HCT VFR BLD AUTO: 34.4 % (ref 38.5–50)
HDLC SERPL-MCNC: 47 MG/DL
HGB BLD-MCNC: 11.4 G/DL (ref 13.2–17.1)
LDLC SERPL CALC-MCNC: 51 MG/DL (CALC)
MAGNESIUM SERPL-MCNC: 2.1 MG/DL (ref 1.5–2.5)
MCH RBC QN AUTO: 28.9 PG (ref 27–33)
MCHC RBC AUTO-ENTMCNC: 33.1 G/DL (ref 32–36)
MCV RBC AUTO: 87.1 FL (ref 80–100)
NONHDLC SERPL-MCNC: 73 MG/DL (CALC)
PLATELET # BLD AUTO: 246 THOUSAND/UL (ref 140–400)
PMV BLD REES-ECKER: 9.9 FL (ref 7.5–12.5)
POTASSIUM SERPL-SCNC: 4.3 MMOL/L (ref 3.5–5.3)
PROT SERPL-MCNC: 6.4 G/DL (ref 6.1–8.1)
RBC # BLD AUTO: 3.95 MILLION/UL (ref 4.2–5.8)
SODIUM SERPL-SCNC: 141 MMOL/L (ref 135–146)
TRIGL SERPL-MCNC: 134 MG/DL
TSH SERPL-ACNC: 2.34 MIU/L (ref 0.4–4.5)
WBC # BLD AUTO: 8.5 THOUSAND/UL (ref 3.8–10.8)

## 2025-03-25 NOTE — TELEPHONE ENCOUNTER
----- Message from Milly Kim sent at 3/25/2025 10:36 AM EDT -----  Please call patient with results.  Labs looked ok, continue current meds.

## 2025-03-29 ENCOUNTER — APPOINTMENT (OUTPATIENT)
Dept: PRIMARY CARE | Facility: CLINIC | Age: 80
End: 2025-03-29
Payer: MEDICARE

## 2025-03-29 VITALS
HEIGHT: 72 IN | SYSTOLIC BLOOD PRESSURE: 130 MMHG | WEIGHT: 244 LBS | DIASTOLIC BLOOD PRESSURE: 64 MMHG | BODY MASS INDEX: 33.05 KG/M2

## 2025-03-29 DIAGNOSIS — I10 PRIMARY HYPERTENSION: Primary | ICD-10-CM

## 2025-03-29 DIAGNOSIS — N18.32 CHRONIC KIDNEY DISEASE, STAGE 3B (MULTI): ICD-10-CM

## 2025-03-29 DIAGNOSIS — E78.00 HYPERCHOLESTEROLEMIA: ICD-10-CM

## 2025-03-29 DIAGNOSIS — L97.909 ULCER OF LOWER EXTREMITY, UNSPECIFIED LATERALITY, UNSPECIFIED ULCER STAGE: ICD-10-CM

## 2025-03-29 DIAGNOSIS — K21.9 GASTROESOPHAGEAL REFLUX DISEASE, UNSPECIFIED WHETHER ESOPHAGITIS PRESENT: ICD-10-CM

## 2025-03-29 DIAGNOSIS — N18.30 CHRONIC RENAL FAILURE, STAGE 3 (MODERATE), UNSPECIFIED WHETHER STAGE 3A OR 3B CKD (MULTI): ICD-10-CM

## 2025-03-29 DIAGNOSIS — E08.44 DIABETES MELLITUS DUE TO UNDERLYING CONDITION WITH DIABETIC AMYOTROPHY, WITHOUT LONG-TERM CURRENT USE OF INSULIN: ICD-10-CM

## 2025-03-29 PROCEDURE — 1036F TOBACCO NON-USER: CPT | Performed by: INTERNAL MEDICINE

## 2025-03-29 PROCEDURE — 99214 OFFICE O/P EST MOD 30 MIN: CPT | Performed by: INTERNAL MEDICINE

## 2025-03-29 PROCEDURE — 3078F DIAST BP <80 MM HG: CPT | Performed by: INTERNAL MEDICINE

## 2025-03-29 PROCEDURE — 1159F MED LIST DOCD IN RCRD: CPT | Performed by: INTERNAL MEDICINE

## 2025-03-29 PROCEDURE — 3075F SYST BP GE 130 - 139MM HG: CPT | Performed by: INTERNAL MEDICINE

## 2025-03-29 PROCEDURE — 1123F ACP DISCUSS/DSCN MKR DOCD: CPT | Performed by: INTERNAL MEDICINE

## 2025-03-29 PROCEDURE — G2211 COMPLEX E/M VISIT ADD ON: HCPCS | Performed by: INTERNAL MEDICINE

## 2025-03-29 RX ORDER — HYDROCHLOROTHIAZIDE 25 MG/1
25 TABLET ORAL DAILY
Qty: 90 TABLET | Refills: 1 | Status: SHIPPED | OUTPATIENT
Start: 2025-03-29 | End: 2025-06-27

## 2025-03-29 NOTE — PROGRESS NOTES
Subjective   Patient ID: Charles Chan is a 79 y.o. male who presents for Follow-up.    Mr. Charles Chan today came here for follow-up on various conditions.  He likes his Pop, not diet.  Overall, feeling okay.  Blood sugar okay.  He wants a refill on medication.    I have personally reviewed the patient's Past Medical History, Medications, Allergies, Social History, and Family History in the EMR.    Review of Systems   All other systems reviewed and are negative.    Objective   /64   Ht 1.829 m (6')   Wt 111 kg (244 lb)   BMI 33.09 kg/m²     Physical Exam  Vitals reviewed.   Cardiovascular:      Heart sounds: Normal heart sounds, S1 normal and S2 normal. No murmur heard.     No friction rub.   Pulmonary:      Effort: Pulmonary effort is normal.      Breath sounds: Normal breath sounds and air entry.   Abdominal:      Palpations: There is no hepatomegaly, splenomegaly or mass.   Musculoskeletal:      Right lower leg: No edema.      Left lower leg: No edema.   Lymphadenopathy:      Lower Body: No right inguinal adenopathy. No left inguinal adenopathy.   Neurological:      Cranial Nerves: Cranial nerves 2-12 are intact.      Sensory: No sensory deficit.      Motor: Motor function is intact.      Deep Tendon Reflexes: Reflexes are normal and symmetric.     LAB WORK:  Laboratory testing discussed.    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 Chronic renal failure, stage 3, stable.  Monitor.  2. Hypertension, okay.  3. Cholesterol, stable.  4. Type 2 diabetes, pre-diabetic.  Check hemoglobin A1c.  5. PSA, okay.  6. GERD, on PPI.  7. Refill given.  8. I shall see him back in six weeks with repeat test.  9. Please lose weight.    Macrinaibe Attestation  By signing my name below, I, Rich Salazar attest that this documentation has been prepared under the direction and in the presence of Moi Pryor MD.     All medical record entries made by the rich were personally dictated by me I have reviewed the chart and agree the record accurately reflects my personal performance of his history physical examination and management

## 2025-04-10 DIAGNOSIS — I25.10 CORONARY ARTERY DISEASE INVOLVING NATIVE CORONARY ARTERY OF NATIVE HEART WITHOUT ANGINA PECTORIS: ICD-10-CM

## 2025-04-10 RX ORDER — ROSUVASTATIN CALCIUM 40 MG/1
40 TABLET, COATED ORAL DAILY
Qty: 90 TABLET | Refills: 1 | Status: SHIPPED | OUTPATIENT
Start: 2025-04-10

## 2025-06-03 LAB
ALBUMIN SERPL-MCNC: 4 G/DL (ref 3.6–5.1)
ALP SERPL-CCNC: 78 U/L (ref 35–144)
ALT SERPL-CCNC: 12 U/L (ref 9–46)
ANION GAP SERPL CALCULATED.4IONS-SCNC: 8 MMOL/L (CALC) (ref 7–17)
AST SERPL-CCNC: 17 U/L (ref 10–35)
BASOPHILS # BLD AUTO: 48 CELLS/UL (ref 0–200)
BASOPHILS NFR BLD AUTO: 0.7 %
BILIRUB SERPL-MCNC: 0.7 MG/DL (ref 0.2–1.2)
BUN SERPL-MCNC: 42 MG/DL (ref 7–25)
CALCIUM SERPL-MCNC: 9 MG/DL (ref 8.6–10.3)
CHLORIDE SERPL-SCNC: 109 MMOL/L (ref 98–110)
CO2 SERPL-SCNC: 23 MMOL/L (ref 20–32)
CREAT SERPL-MCNC: 2.86 MG/DL (ref 0.7–1.22)
EGFRCR SERPLBLD CKD-EPI 2021: 21 ML/MIN/1.73M2
EOSINOPHIL # BLD AUTO: 313 CELLS/UL (ref 15–500)
EOSINOPHIL NFR BLD AUTO: 4.6 %
ERYTHROCYTE [DISTWIDTH] IN BLOOD BY AUTOMATED COUNT: 12.6 % (ref 11–15)
EST. AVERAGE GLUCOSE BLD GHB EST-MCNC: 114 MG/DL
EST. AVERAGE GLUCOSE BLD GHB EST-SCNC: 6.3 MMOL/L
GLUCOSE SERPL-MCNC: 114 MG/DL (ref 65–99)
HBA1C MFR BLD: 5.6 %
HCT VFR BLD AUTO: 35.9 % (ref 38.5–50)
HGB BLD-MCNC: 11.5 G/DL (ref 13.2–17.1)
LYMPHOCYTES # BLD AUTO: 1646 CELLS/UL (ref 850–3900)
LYMPHOCYTES NFR BLD AUTO: 24.2 %
MCH RBC QN AUTO: 28 PG (ref 27–33)
MCHC RBC AUTO-ENTMCNC: 32 G/DL (ref 32–36)
MCV RBC AUTO: 87.3 FL (ref 80–100)
MONOCYTES # BLD AUTO: 551 CELLS/UL (ref 200–950)
MONOCYTES NFR BLD AUTO: 8.1 %
NEUTROPHILS # BLD AUTO: 4243 CELLS/UL (ref 1500–7800)
NEUTROPHILS NFR BLD AUTO: 62.4 %
PLATELET # BLD AUTO: 234 THOUSAND/UL (ref 140–400)
PMV BLD REES-ECKER: 9.7 FL (ref 7.5–12.5)
POTASSIUM SERPL-SCNC: 4.4 MMOL/L (ref 3.5–5.3)
PROT SERPL-MCNC: 6.8 G/DL (ref 6.1–8.1)
RBC # BLD AUTO: 4.11 MILLION/UL (ref 4.2–5.8)
SODIUM SERPL-SCNC: 140 MMOL/L (ref 135–146)
TSH SERPL-ACNC: 1.66 MIU/L (ref 0.4–4.5)
WBC # BLD AUTO: 6.8 THOUSAND/UL (ref 3.8–10.8)

## 2025-06-07 ENCOUNTER — APPOINTMENT (OUTPATIENT)
Dept: PRIMARY CARE | Facility: CLINIC | Age: 80
End: 2025-06-07
Payer: MEDICARE

## 2025-06-07 VITALS
BODY MASS INDEX: 31.49 KG/M2 | HEIGHT: 73 IN | SYSTOLIC BLOOD PRESSURE: 132 MMHG | DIASTOLIC BLOOD PRESSURE: 66 MMHG | WEIGHT: 237.6 LBS

## 2025-06-07 DIAGNOSIS — Z13.29 THYROID DISORDER SCREENING: ICD-10-CM

## 2025-06-07 DIAGNOSIS — E78.00 HYPERCHOLESTEROLEMIA: ICD-10-CM

## 2025-06-07 DIAGNOSIS — N18.30 CHRONIC RENAL FAILURE, STAGE 3 (MODERATE), UNSPECIFIED WHETHER STAGE 3A OR 3B CKD (MULTI): Primary | ICD-10-CM

## 2025-06-07 DIAGNOSIS — I10 PRIMARY HYPERTENSION: ICD-10-CM

## 2025-06-07 PROCEDURE — 3075F SYST BP GE 130 - 139MM HG: CPT | Performed by: INTERNAL MEDICINE

## 2025-06-07 PROCEDURE — 1159F MED LIST DOCD IN RCRD: CPT | Performed by: INTERNAL MEDICINE

## 2025-06-07 PROCEDURE — 3078F DIAST BP <80 MM HG: CPT | Performed by: INTERNAL MEDICINE

## 2025-06-07 PROCEDURE — 99214 OFFICE O/P EST MOD 30 MIN: CPT | Performed by: INTERNAL MEDICINE

## 2025-06-07 NOTE — PROGRESS NOTES
"Subjective   Patient ID: Charles Chan is a 80 y.o. male who presents for Follow-up (on various conditions.).    Mr. Chan today came here for follow-up on various conditions.  Overall, he is a happy person.  Appetite and weight are okay.  No chest pain.  He is enjoying his care home.  Now wife is also retiring soon.  He is looking forward for good life ahead.    I have personally reviewed the patient's Past Medical History, Medications, Allergies, Social History, and Family History in the EMR.    Review of Systems   All other systems reviewed and are negative.    Objective   /66   Ht 1.854 m (6' 1\")   Wt 108 kg (237 lb 9.6 oz)   BMI 31.35 kg/m²     Physical Exam  Vitals reviewed.   Cardiovascular:      Heart sounds: Normal heart sounds, S1 normal and S2 normal. No murmur heard.     No friction rub.   Pulmonary:      Effort: Pulmonary effort is normal.      Breath sounds: Normal breath sounds and air entry.   Abdominal:      Palpations: There is no hepatomegaly, splenomegaly or mass.   Musculoskeletal:      Right lower leg: No edema.      Left lower leg: No edema.   Lymphadenopathy:      Lower Body: No right inguinal adenopathy. No left inguinal adenopathy.   Neurological:      Cranial Nerves: Cranial nerves 2-12 are intact.      Sensory: No sensory deficit.      Motor: Motor function is intact.      Deep Tendon Reflexes: Reflexes are normal and symmetric.     LAB WORK:  Laboratory testing discussed.    Assessment/Plan   Problem List Items Addressed This Visit           ICD-10-CM    Hypertension I10    Hypercholesterolemia E78.00     Other Visit Diagnoses         Codes      Chronic renal failure, stage 3 (moderate), unspecified whether stage 3a or 3b CKD (Multi)    -  Primary N18.30    Relevant Orders    Basic Metabolic Panel      Thyroid disorder screening     Z13.29        1. Renal insufficiency, stage 3, failure.  He admitted that his salt intake can be reduced.  He is going to work on it.  Other " than that he is a man of good habits.  We will monitor kidney.  I begged him to monitor salt intake and drink enough water.  2. Hypertension, okay.  3. High cholesterol, stable.  4. Thyroid.  Monitor.  5. Follow-up appointment with me in six weeks.    Macrinaibsharifa Attestation  By signing my name below, ILeonie Scribe attest that this documentation has been prepared under the direction and in the presence of Moi Pryor MD.     All medical record entries made by the macrinaibe were personally dictated by me I have reviewed the chart and agree the record accurately reflects my personal performance of his history physical examination and management

## 2025-07-17 LAB
ANION GAP SERPL CALCULATED.4IONS-SCNC: 10 MMOL/L (CALC) (ref 7–17)
BUN SERPL-MCNC: 33 MG/DL (ref 7–25)
BUN/CREAT SERPL: 14 (CALC) (ref 6–22)
CALCIUM SERPL-MCNC: 9.2 MG/DL (ref 8.6–10.3)
CHLORIDE SERPL-SCNC: 111 MMOL/L (ref 98–110)
CO2 SERPL-SCNC: 20 MMOL/L (ref 20–32)
CREAT SERPL-MCNC: 2.42 MG/DL (ref 0.7–1.22)
EGFRCR SERPLBLD CKD-EPI 2021: 26 ML/MIN/1.73M2
GLUCOSE SERPL-MCNC: 90 MG/DL (ref 65–99)
POTASSIUM SERPL-SCNC: 4.7 MMOL/L (ref 3.5–5.3)
SODIUM SERPL-SCNC: 141 MMOL/L (ref 135–146)

## 2025-07-19 ENCOUNTER — APPOINTMENT (OUTPATIENT)
Dept: PRIMARY CARE | Facility: CLINIC | Age: 80
End: 2025-07-19
Payer: MEDICARE

## 2025-07-23 ENCOUNTER — PHARMACY VISIT (OUTPATIENT)
Dept: PHARMACY | Facility: CLINIC | Age: 80
End: 2025-07-23
Payer: COMMERCIAL

## 2025-07-23 ENCOUNTER — APPOINTMENT (OUTPATIENT)
Dept: PRIMARY CARE | Facility: CLINIC | Age: 80
End: 2025-07-23
Payer: MEDICARE

## 2025-07-23 VITALS
SYSTOLIC BLOOD PRESSURE: 140 MMHG | DIASTOLIC BLOOD PRESSURE: 72 MMHG | HEIGHT: 73 IN | WEIGHT: 241 LBS | BODY MASS INDEX: 31.94 KG/M2

## 2025-07-23 DIAGNOSIS — E11.9 TYPE 2 DIABETES MELLITUS WITHOUT COMPLICATION, UNSPECIFIED WHETHER LONG TERM INSULIN USE: ICD-10-CM

## 2025-07-23 DIAGNOSIS — N18.4 CHRONIC KIDNEY DISEASE (CKD), STAGE IV (SEVERE) (MULTI): ICD-10-CM

## 2025-07-23 DIAGNOSIS — G45.9 TIA (TRANSIENT ISCHEMIC ATTACK): ICD-10-CM

## 2025-07-23 DIAGNOSIS — N18.30 CHRONIC RENAL FAILURE, STAGE 3 (MODERATE), UNSPECIFIED WHETHER STAGE 3A OR 3B CKD (MULTI): ICD-10-CM

## 2025-07-23 DIAGNOSIS — E78.00 HYPERCHOLESTEROLEMIA: ICD-10-CM

## 2025-07-23 DIAGNOSIS — I25.10 CORONARY ARTERY DISEASE INVOLVING NATIVE CORONARY ARTERY OF NATIVE HEART WITHOUT ANGINA PECTORIS: ICD-10-CM

## 2025-07-23 DIAGNOSIS — Z00.00 MEDICARE ANNUAL WELLNESS VISIT, INITIAL: Primary | ICD-10-CM

## 2025-07-23 DIAGNOSIS — Z87.438 PERSONAL HISTORY OF OTHER DISEASES OF MALE GENITAL ORGANS: ICD-10-CM

## 2025-07-23 DIAGNOSIS — Z00.00 ENCOUNTER FOR GENERAL ADULT MEDICAL EXAMINATION WITHOUT ABNORMAL FINDINGS: ICD-10-CM

## 2025-07-23 DIAGNOSIS — I10 PRIMARY HYPERTENSION: ICD-10-CM

## 2025-07-23 DIAGNOSIS — K21.9 GASTROESOPHAGEAL REFLUX DISEASE, UNSPECIFIED WHETHER ESOPHAGITIS PRESENT: ICD-10-CM

## 2025-07-23 PROCEDURE — 99213 OFFICE O/P EST LOW 20 MIN: CPT | Performed by: INTERNAL MEDICINE

## 2025-07-23 PROCEDURE — 1170F FXNL STATUS ASSESSED: CPT | Performed by: INTERNAL MEDICINE

## 2025-07-23 PROCEDURE — G0439 PPPS, SUBSEQ VISIT: HCPCS | Performed by: INTERNAL MEDICINE

## 2025-07-23 PROCEDURE — 3077F SYST BP >= 140 MM HG: CPT | Performed by: INTERNAL MEDICINE

## 2025-07-23 PROCEDURE — 1159F MED LIST DOCD IN RCRD: CPT | Performed by: INTERNAL MEDICINE

## 2025-07-23 PROCEDURE — 1160F RVW MEDS BY RX/DR IN RCRD: CPT | Performed by: INTERNAL MEDICINE

## 2025-07-23 PROCEDURE — RXMED WILLOW AMBULATORY MEDICATION CHARGE

## 2025-07-23 PROCEDURE — 3078F DIAST BP <80 MM HG: CPT | Performed by: INTERNAL MEDICINE

## 2025-07-23 RX ORDER — FINASTERIDE 5 MG/1
5 TABLET, FILM COATED ORAL DAILY
Qty: 90 TABLET | Refills: 1 | Status: SHIPPED | OUTPATIENT
Start: 2025-07-23

## 2025-07-23 RX ORDER — FAMOTIDINE 20 MG/1
20 TABLET, FILM COATED ORAL 2 TIMES DAILY
Qty: 180 TABLET | Refills: 1 | Status: SHIPPED | OUTPATIENT
Start: 2025-07-23

## 2025-07-23 RX ORDER — CLOPIDOGREL BISULFATE 75 MG/1
75 TABLET ORAL DAILY
Qty: 90 TABLET | Refills: 1 | Status: SHIPPED | OUTPATIENT
Start: 2025-07-23

## 2025-07-23 RX ORDER — AMLODIPINE BESYLATE 10 MG/1
10 TABLET ORAL DAILY
Qty: 90 TABLET | Refills: 1 | Status: SHIPPED | OUTPATIENT
Start: 2025-07-23 | End: 2026-07-23

## 2025-07-23 RX ORDER — HYDROCHLOROTHIAZIDE 25 MG/1
25 TABLET ORAL DAILY
Qty: 90 TABLET | Refills: 1 | Status: SHIPPED | OUTPATIENT
Start: 2025-07-23 | End: 2025-10-21

## 2025-07-23 RX ORDER — CLONIDINE HYDROCHLORIDE 0.1 MG/1
0.1 TABLET ORAL 2 TIMES DAILY
Qty: 180 TABLET | Refills: 1 | Status: SHIPPED | OUTPATIENT
Start: 2025-07-23 | End: 2026-07-23

## 2025-07-23 RX ORDER — ROSUVASTATIN CALCIUM 40 MG/1
40 TABLET, COATED ORAL DAILY
Qty: 90 TABLET | Refills: 1 | Status: SHIPPED | OUTPATIENT
Start: 2025-07-23

## 2025-07-23 RX ORDER — ZOSTER VACCINE RECOMBINANT, ADJUVANTED 50 MCG/0.5
0.5 KIT INTRAMUSCULAR
Qty: 0.5 ML | Refills: 1 | OUTPATIENT
Start: 2025-07-23

## 2025-07-23 ASSESSMENT — ENCOUNTER SYMPTOMS
OCCASIONAL FEELINGS OF UNSTEADINESS: 0
LOSS OF SENSATION IN FEET: 0
DEPRESSION: 0

## 2025-07-23 ASSESSMENT — PATIENT HEALTH QUESTIONNAIRE - PHQ9
1. LITTLE INTEREST OR PLEASURE IN DOING THINGS: NOT AT ALL
2. FEELING DOWN, DEPRESSED OR HOPELESS: NOT AT ALL
SUM OF ALL RESPONSES TO PHQ9 QUESTIONS 1 AND 2: 0

## 2025-07-23 ASSESSMENT — ACTIVITIES OF DAILY LIVING (ADL)
DRESSING: INDEPENDENT
BATHING: INDEPENDENT

## 2025-07-24 PROBLEM — N18.4 CHRONIC KIDNEY DISEASE (CKD), STAGE IV (SEVERE) (MULTI): Status: ACTIVE | Noted: 2025-07-24

## 2025-09-02 ENCOUNTER — OFFICE VISIT (OUTPATIENT)
Dept: PRIMARY CARE | Facility: CLINIC | Age: 80
End: 2025-09-02
Payer: MEDICARE

## 2025-09-02 VITALS
HEIGHT: 73 IN | WEIGHT: 248 LBS | DIASTOLIC BLOOD PRESSURE: 78 MMHG | SYSTOLIC BLOOD PRESSURE: 160 MMHG | BODY MASS INDEX: 32.87 KG/M2

## 2025-09-02 DIAGNOSIS — I89.0 LYMPHEDEMA: Primary | ICD-10-CM

## 2025-09-02 DIAGNOSIS — L85.3 DRY SKIN: ICD-10-CM

## 2025-09-02 DIAGNOSIS — N40.0 BENIGN PROSTATIC HYPERPLASIA, UNSPECIFIED WHETHER LOWER URINARY TRACT SYMPTOMS PRESENT: ICD-10-CM

## 2025-09-02 DIAGNOSIS — I10 PRIMARY HYPERTENSION: ICD-10-CM

## 2025-09-02 DIAGNOSIS — E78.00 HYPERCHOLESTEROLEMIA: ICD-10-CM

## 2025-09-02 DIAGNOSIS — K21.9 GASTROESOPHAGEAL REFLUX DISEASE, UNSPECIFIED WHETHER ESOPHAGITIS PRESENT: ICD-10-CM

## 2025-09-02 DIAGNOSIS — N18.9 CHRONIC RENAL FAILURE, UNSPECIFIED CKD STAGE: ICD-10-CM

## 2025-09-02 DIAGNOSIS — M79.89 LEG SWELLING: ICD-10-CM

## 2025-09-02 PROCEDURE — 3077F SYST BP >= 140 MM HG: CPT | Performed by: INTERNAL MEDICINE

## 2025-09-02 PROCEDURE — 99214 OFFICE O/P EST MOD 30 MIN: CPT | Performed by: INTERNAL MEDICINE

## 2025-09-02 PROCEDURE — 1159F MED LIST DOCD IN RCRD: CPT | Performed by: INTERNAL MEDICINE

## 2025-09-02 PROCEDURE — 3078F DIAST BP <80 MM HG: CPT | Performed by: INTERNAL MEDICINE

## 2025-09-02 PROCEDURE — G2211 COMPLEX E/M VISIT ADD ON: HCPCS | Performed by: INTERNAL MEDICINE

## 2025-09-02 RX ORDER — CLONIDINE HYDROCHLORIDE 0.1 MG/1
0.1 TABLET ORAL 3 TIMES DAILY
Qty: 270 TABLET | Refills: 0 | Status: SHIPPED | OUTPATIENT
Start: 2025-09-02 | End: 2025-12-01

## 2025-10-13 ENCOUNTER — APPOINTMENT (OUTPATIENT)
Dept: PRIMARY CARE | Facility: CLINIC | Age: 80
End: 2025-10-13
Payer: MEDICARE

## (undated) DEVICE — APPLIER, MULTIPLE CLIP, LIGACLIP, W/20 MEDIUM, 11.5 APPLIER

## (undated) DEVICE — SPONGE, GAUZE, AVANT, STERILE, NONWOVEN, 4PLY, 4 X 4, STANDARD

## (undated) DEVICE — Device

## (undated) DEVICE — STRIP, SKIN CLOSURE, STERI STRIP, REINFORCED, 0.5 X 4 IN

## (undated) DEVICE — NEEDLE, HYPODERMIC, MONOJECT, 22 G X 1.5 IN, BLUE, RIGID PACK

## (undated) DEVICE — DRAPE, INSTRUMENT, W/POUCH, STERI DRAPE, 7 X 11 IN, DISPOSABLE, STERILE

## (undated) DEVICE — APPLICATOR, PREP, ONE-STEP, ANTIMICROBIAL, CHLORAPREP, TINTED, 10.5ML

## (undated) DEVICE — LOOP, VESSEL, MINI, WHITE

## (undated) DEVICE — SUTURE, SILK, 4-0, 18 IN, LABYRINTH, BLACK

## (undated) DEVICE — SUTURE, SILK, 2-0, 18 IN, BLACK

## (undated) DEVICE — COVER, PROBE, FLEXI-FEEL, W/STERILE GEL 4 X 48 IN

## (undated) DEVICE — SYRINGE, 20 CC, LUER LOCK

## (undated) DEVICE — LOOP, VESSEL, MAXI, RED

## (undated) DEVICE — SUTURE, VICRYL, 2-0, 36 IN, CT-1, UNDYED

## (undated) DEVICE — SUTURE, MONOCRYL, 4-0, 27 IN, PS-2, UNDYED

## (undated) DEVICE — SUTURE, PROLENE, 5-0, 18 IN, C-1, BLUE

## (undated) DEVICE — KIT, TOURNIQUET, 7"

## (undated) DEVICE — SOLUTION, INJECTION, SODIUM CHLORIDE 9%, 500ML

## (undated) DEVICE — SOLUTION, INJECTION, USP, LACTATED RINGERS, LIFECARE, 1000ML

## (undated) DEVICE — SUTURE, VICRYL, 3-0, 27 IN, SH

## (undated) DEVICE — DRAPE, INCISE, ANTIMICROBIAL, IOBAN 2, LARGE, 17 X 23 IN, DISPOSABLE, STERILE

## (undated) DEVICE — GLOVE, SURGICAL, PROTEXIS PI , 7.5, PF, LF

## (undated) DEVICE — APPLIER, LIGACLIP, MULTIPLE, SMALL 9-3/8IN

## (undated) DEVICE — SOLUTION, IRRIGATION, X RX SODIUM CHL 0.9%, 1000ML BTL

## (undated) DEVICE — CONTAINER, SPECIMEN, 4 OZ, OR PEEL PACK, STERILE

## (undated) DEVICE — LIGASURE EXACT DISSECTOR

## (undated) DEVICE — SUTURE, PROLENE, 6-0, 18 IN, BV1, DA, BLUE